# Patient Record
Sex: FEMALE | Race: WHITE | NOT HISPANIC OR LATINO | Employment: OTHER | URBAN - METROPOLITAN AREA
[De-identification: names, ages, dates, MRNs, and addresses within clinical notes are randomized per-mention and may not be internally consistent; named-entity substitution may affect disease eponyms.]

---

## 2020-09-21 ENCOUNTER — HOSPITAL ENCOUNTER (EMERGENCY)
Facility: HOSPITAL | Age: 75
Discharge: HOME | End: 2020-09-21
Attending: EMERGENCY MEDICINE
Payer: MEDICARE

## 2020-09-21 VITALS
OXYGEN SATURATION: 97 % | DIASTOLIC BLOOD PRESSURE: 78 MMHG | TEMPERATURE: 99 F | SYSTOLIC BLOOD PRESSURE: 136 MMHG | HEART RATE: 78 BPM | RESPIRATION RATE: 16 BRPM | HEIGHT: 63 IN
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
## 2020-09-21 DIAGNOSIS — S01.01XA LACERATION OF OCCIPITAL SCALP, INITIAL ENCOUNTER: Primary | ICD-10-CM

## 2020-09-21 PROCEDURE — 12001 RPR S/N/AX/GEN/TRNK 2.5CM/<: CPT

## 2020-09-21 PROCEDURE — 25000003 PHARM REV CODE 250: Performed by: NURSE PRACTITIONER

## 2020-09-21 PROCEDURE — 99284 EMERGENCY DEPT VISIT MOD MDM: CPT | Mod: 25

## 2020-09-21 RX ORDER — LOSARTAN POTASSIUM 100 MG/1
100 TABLET ORAL
Status: ON HOLD | COMMUNITY
End: 2021-04-27 | Stop reason: SDUPTHER

## 2020-09-21 RX ORDER — GLIPIZIDE 2.5 MG/1
5 TABLET, EXTENDED RELEASE ORAL
Status: ON HOLD | COMMUNITY
End: 2021-04-27 | Stop reason: HOSPADM

## 2020-09-21 RX ORDER — TRAZODONE HYDROCHLORIDE 100 MG/1
100 TABLET ORAL NIGHTLY
COMMUNITY

## 2020-09-21 RX ORDER — CARVEDILOL 25 MG/1
25 TABLET ORAL
Status: ON HOLD | COMMUNITY
End: 2021-04-27 | Stop reason: SDUPTHER

## 2020-09-21 RX ORDER — LEVOTHYROXINE SODIUM 100 UG/1
100 TABLET ORAL
COMMUNITY

## 2020-09-21 RX ORDER — ATORVASTATIN CALCIUM 20 MG/1
20 TABLET, FILM COATED ORAL
Status: ON HOLD | COMMUNITY
End: 2021-04-27 | Stop reason: SDUPTHER

## 2020-09-21 RX ORDER — LIDOCAINE HYDROCHLORIDE 10 MG/ML
5 INJECTION, SOLUTION EPIDURAL; INFILTRATION; INTRACAUDAL; PERINEURAL ONCE
Status: DISCONTINUED | OUTPATIENT
Start: 2020-09-21 | End: 2020-09-21 | Stop reason: HOSPADM

## 2020-09-21 RX ADMIN — Medication: at 06:09

## 2020-09-21 NOTE — ED PROVIDER NOTES
Encounter Date: 9/21/2020       History     Chief Complaint   Patient presents with    Fall     fell while moving a table today and fell and hit back of head, denies loss of consciousness, was told by PCP to come to ER and get CT. no blood thinners.     75-year-old female presents with her  related to fall incident today while she was at home between 130 and 3:00 p.m.  She was helping her  move the diarrhea room table and tripped and fell backwards and hit the occipital region posterior scalp, sustained laceration and contusion injury.  She was seen at her primary care physician's office around 330 today and told to report to the emergency room to have CT scan of head.   No loss of consciousness.  No confusion.  Has remained oriented x 3.   Provides her prior medical history this presentation without difficulty.          Review of patient's allergies indicates:  No Known Allergies  History reviewed. No pertinent past medical history.  Past Surgical History:   Procedure Laterality Date    APPENDECTOMY      CARDIAC SURGERY      CHOLECYSTECTOMY      CORONARY ARTERY BYPASS GRAFT      HYSTERECTOMY      THYROID SURGERY  1995     Family History   Problem Relation Age of Onset    Chronic back pain Mother     Heart disease Father     Hypertension Father      Social History     Tobacco Use    Smoking status: Never Smoker   Substance Use Topics    Alcohol use: Yes     Alcohol/week: 2.0 standard drinks     Types: 2 Glasses of wine per week     Comment: nightly, 2 glasses of wine    Drug use: Never     Review of Systems   Constitutional: Negative for activity change, appetite change, fatigue and fever.   HENT: Negative.  Negative for sore throat.    Eyes: Negative.    Respiratory: Negative for shortness of breath.    Cardiovascular: Negative for chest pain, palpitations and leg swelling.   Gastrointestinal: Negative.  Negative for abdominal pain and nausea.   Endocrine: Negative for cold intolerance  and heat intolerance.   Genitourinary: Negative for decreased urine volume, difficulty urinating, dysuria and frequency.   Musculoskeletal: Negative for back pain.   Skin: Positive for wound (scalp). Negative for color change and rash.   Neurological: Negative for dizziness, tremors, seizures, syncope, facial asymmetry, speech difficulty, weakness, light-headedness, numbness and headaches.   Hematological: Does not bruise/bleed easily.       Physical Exam     Initial Vitals [09/21/20 1727]   BP Pulse Resp Temp SpO2   (!) 144/71 (!) 59 16 98.5 °F (36.9 °C) (!) 94 %      MAP       --         Physical Exam    Nursing note and vitals reviewed.  Constitutional: She appears well-developed and well-nourished.   HENT:   Head: Normocephalic and atraumatic.       Right Ear: Tympanic membrane, external ear and ear canal normal.   Left Ear: Tympanic membrane, external ear and ear canal normal.   Nose: Nose normal.   Mouth/Throat: Oropharynx is clear and moist. No oropharyngeal exudate.   Eyes: Conjunctivae, EOM and lids are normal. Pupils are equal, round, and reactive to light.   Neck: Trachea normal, normal range of motion and full passive range of motion without pain. Neck supple.   Cardiovascular: Normal rate, regular rhythm, normal heart sounds and intact distal pulses.   Pulmonary/Chest: Breath sounds normal.   Abdominal: Soft.   Musculoskeletal: Normal range of motion.   Neurological: She is alert and oriented to person, place, and time. She has normal strength and normal reflexes. No cranial nerve deficit or sensory deficit. Coordination and gait normal.   Skin: Skin is warm and dry. Capillary refill takes less than 2 seconds.   Psychiatric: She has a normal mood and affect. Her behavior is normal. Thought content normal.         ED Course   Lac Repair    Date/Time: 9/21/2020 7:55 PM  Performed by: Kellen Howard NP  Authorized by: Loren Brumfield DO   Body area: head/neck  Location details: scalp  Laceration  length: 2 cm  Foreign bodies: no foreign bodies  Tendon involvement: none  Nerve involvement: none  Vascular damage: no  Anesthesia method: let     Anesthesia:  Local Anesthetic: LET (lido,epi,tetracaine) and lidocaine 1% without epinephrine  Anesthetic total: 5 mL  Patient sedated: no  Irrigation solution: saline  Irrigation method: syringe  Amount of cleaning: standard  Debridement: none  Degree of undermining: none  Skin closure: staples  Number of sutures: 2 (2 staples)  Approximation: loose  Approximation difficulty: simple  Dressing: 4x4 sterile gauze  Patient tolerance: Patient tolerated the procedure well with no immediate complications      patient refused lab PT, complete blood count, basic metabolic panel, hepatitis c antibody, states had lab work today at primary care provider office.   Labs Reviewed - No data to display       Imaging Results          CT Head Without Contrast (Final result)  Result time 09/21/20 18:31:27    Final result by David Crawford MD (09/21/20 18:31:27)                 Impression:      No acute intracranial CT abnormality.    Small extra calvarial occipital area of fat stranding.    All CT scans at this facility are performed  using dose modulation techniques as appropriate to performed exam including the following:  automated exposure control; adjustment of mA and/or kV according to the patients size (this includes techniques or standardized protocols for targeted exams where dose is matched to indication/reason for exam: i.e. extremities or head);  iterative reconstruction technique.      Electronically signed by: David Crawford  Date:    09/21/2020  Time:    18:31             Narrative:    EXAMINATION:  CT HEAD WITHOUT CONTRAST    CLINICAL HISTORY:  fall with occipital head trauma/laceration;    TECHNIQUE:  Low dose axial CT images obtained throughout the head without intravenous contrast. Sagittal and coronal reconstructions were performed.    COMPARISON:  CT head without  contrast 07/11/2012    FINDINGS:  Intracranial compartment:    Ventricles and sulci are normal in size for age without evidence of hydrocephalus. No extra-axial blood or fluid collections.    Mild microvascular ischemic changes.  Probable remote lacunar infarcts in the right thalamus, left external capsule, and left thalamus.  No parenchymal mass, hemorrhage, edema or major vascular distribution infarct.    Skull/extracranial contents (limited evaluation): No fracture. Mastoid air cells and paranasal sinuses are essentially clear.  Small extra calvarial occipital area of fat stranding.                                                           0800 Reassessed pt at this time. Pt is awake, alert, and in NAD at this time. Discussed with pt all pertinent ED information and results. Discussed pt dx and plan of tx. Gave pt all f/u and return to the ED instructions. All questions and concerns were addressed at this time. Pt expresses understanding of information and instructions, and is comfortable with plan to discharge. Pt is stable for discharge.  Patient remained neurologically intact and oriented and appropriate in tired stay in the emergency room.  Advised of results.     Clinical Impression:     ICD-10-CM ICD-9-CM   1. Laceration of occipital scalp, initial encounter  S01.01XA 873.0   2. Fall, initial encounter  W19.XXXA E888.9                      Disposition:   Disposition: Discharged  Condition: Stable     ED Disposition Condition    Discharge Stable        ED Prescriptions     None        Follow-up Information     Follow up With Specialties Details Why Contact Info    April H MD [facility] In 1 week recheck wound and remove staples [address] [facility] [address]  [phone]      [facility] -  Emergency Medicine  If symptoms worsen or new onset of symptoms [address]  [facility] [address] [phone]                                        Kellen Howard, PEGGY  09/21/20 2006       Kellen Howard, PEGGY  10/07/20 0832

## 2020-09-22 ENCOUNTER — PES CALL (OUTPATIENT)
Dept: ADMINISTRATIVE | Facility: CLINIC | Age: 75
End: 2020-09-22

## 2020-09-22 NOTE — ED NOTES
Patient identifiers verified and correct for Emely Bush. Patient accompanied by  to ER after fall earlier today moving a table. Patient denies loss of consciousness and denies being on blood thinners but is found to have a laceration to the back of her head.    LOC: The patient is awake, alert and aware of environment with an appropriate affect, the patient is oriented x 3 and speaking appropriately.  APPEARANCE: Patient resting comfortably and in no acute distress, patient is clean and well groomed, patient's clothing is properly fastened.  SKIN: The skin is warm and dry, color consistent with ethnicity, patient has normal skin turgor and moist mucus membranes, skin intact, no breakdown or bruising noted.  MUSCULOSKELETAL: Patient moving all extremities spontaneously.  RESPIRATORY: Airway is open and patent, respirations are spontaneous.  CARDIAC: Patient has a normal rate, no periphreal edema noted, capillary refill < 3 seconds.  ABDOMEN: Soft and non tender to palpation.

## 2020-09-22 NOTE — ED NOTES
Small laceration noted to occipital aspect of head, approximately 1cm in length, scant amount of dried blood noted, not actively bleeding at this time. AAOx4

## 2021-04-24 ENCOUNTER — HOSPITAL ENCOUNTER (INPATIENT)
Facility: HOSPITAL | Age: 76
LOS: 3 days | Discharge: NURSING, REHAB OR LONG-TERM CARE | DRG: 481 | End: 2021-04-27
Attending: FAMILY MEDICINE | Admitting: INTERNAL MEDICINE
Payer: MEDICARE

## 2021-04-24 DIAGNOSIS — E11.40 CONTROLLED TYPE 2 DIABETES WITH NEUROPATHY: ICD-10-CM

## 2021-04-24 DIAGNOSIS — F10.930 ALCOHOL WITHDRAWAL SYNDROME WITHOUT COMPLICATION: ICD-10-CM

## 2021-04-24 DIAGNOSIS — G89.29 CHRONIC LOW BACK PAIN WITH SCIATICA, SCIATICA LATERALITY UNSPECIFIED, UNSPECIFIED BACK PAIN LATERALITY: ICD-10-CM

## 2021-04-24 DIAGNOSIS — S72.142A CLOSED INTERTROCHANTERIC FRACTURE OF LEFT HIP, INITIAL ENCOUNTER: ICD-10-CM

## 2021-04-24 DIAGNOSIS — I50.9 CHF (CONGESTIVE HEART FAILURE): ICD-10-CM
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
## 2021-04-24 DIAGNOSIS — M54.40 CHRONIC LOW BACK PAIN WITH SCIATICA, SCIATICA LATERALITY UNSPECIFIED, UNSPECIFIED BACK PAIN LATERALITY: ICD-10-CM

## 2021-04-24 DIAGNOSIS — S72.92XA CLOSED FRACTURE OF LEFT FEMUR, UNSPECIFIED FRACTURE MORPHOLOGY, UNSPECIFIED PORTION OF FEMUR, INITIAL ENCOUNTER: Primary | ICD-10-CM

## 2021-04-24 DIAGNOSIS — Z01.818 PRE-OP EVALUATION: ICD-10-CM

## 2021-04-24 LAB
ABO + RH BLD: NORMAL
ALBUMIN SERPL BCP-MCNC: 3.3 G/DL (ref 3.5–5.2)
ALP SERPL-CCNC: 72 U/L (ref 55–135)
ALT SERPL W/O P-5'-P-CCNC: 20 U/L (ref 10–44)
ANION GAP SERPL CALC-SCNC: 11 MMOL/L (ref 8–16)
APTT BLDCRRT: <21 SEC (ref 21–32)
AST SERPL-CCNC: 28 U/L (ref 10–40)
AV INDEX (PROSTH): 0.47
AV MEAN GRADIENT: 6 MMHG
AV PEAK GRADIENT: 11 MMHG
AV VALVE AREA: 1.5 CM2
AV VELOCITY RATIO: 0.59
BASOPHILS # BLD AUTO: 0.05 K/UL (ref 0–0.2)
BASOPHILS NFR BLD: 0.5 % (ref 0–1.9)
BILIRUB SERPL-MCNC: 0.4 MG/DL (ref 0.1–1)
BILIRUB UR QL STRIP: NEGATIVE
BLD GP AB SCN CELLS X3 SERPL QL: NORMAL
BUN SERPL-MCNC: 11 MG/DL (ref 8–23)
CALCIUM SERPL-MCNC: 8 MG/DL (ref 8.7–10.5)
CHLORIDE SERPL-SCNC: 104 MMOL/L (ref 95–110)
CLARITY UR: CLEAR
CO2 SERPL-SCNC: 22 MMOL/L (ref 23–29)
COLOR UR: YELLOW
CREAT SERPL-MCNC: 0.7 MG/DL (ref 0.5–1.4)
CTP QC/QA: YES
CV ECHO LV RWT: 0.47 CM
DIFFERENTIAL METHOD: ABNORMAL
DOP CALC AO PEAK VEL: 1.63 M/S
DOP CALC AO VTI: 39.3 CM
DOP CALC LVOT AREA: 3.2 CM2
DOP CALC LVOT DIAMETER: 2.02 CM
DOP CALC LVOT PEAK VEL: 0.96 M/S
DOP CALC LVOT STROKE VOLUME: 59 CM3
DOP CALCLVOT PEAK VEL VTI: 18.42 CM
E WAVE DECELERATION TIME: 265.48 MSEC
E/A RATIO: 0.83
E/E' RATIO: 11.2 M/S
ECHO LV POSTERIOR WALL: 1.1 CM (ref 0.6–1.1)
EJECTION FRACTION: 55 %
EOSINOPHIL # BLD AUTO: 0 K/UL (ref 0–0.5)
EOSINOPHIL NFR BLD: 0.3 % (ref 0–8)
ERYTHROCYTE [DISTWIDTH] IN BLOOD BY AUTOMATED COUNT: 15 % (ref 11.5–14.5)
EST. GFR  (AFRICAN AMERICAN): >60 ML/MIN/1.73 M^2
EST. GFR  (NON AFRICAN AMERICAN): >60 ML/MIN/1.73 M^2
ESTIMATED AVG GLUCOSE: 163 MG/DL (ref 68–131)
FRACTIONAL SHORTENING: 24 % (ref 28–44)
GLUCOSE SERPL-MCNC: 221 MG/DL (ref 70–110)
GLUCOSE UR QL STRIP: ABNORMAL
HBA1C MFR BLD: 7.3 % (ref 4–5.6)
HCT VFR BLD AUTO: 30.5 % (ref 37–48.5)
HGB BLD-MCNC: 9.5 G/DL (ref 12–16)
HGB UR QL STRIP: NEGATIVE
IMM GRANULOCYTES # BLD AUTO: 0.05 K/UL (ref 0–0.04)
IMM GRANULOCYTES NFR BLD AUTO: 0.5 % (ref 0–0.5)
INR PPP: 1 (ref 0.8–1.2)
INTERVENTRICULAR SEPTUM: 1.13 CM (ref 0.6–1.1)
IVRT: 99.9 MSEC
KETONES UR QL STRIP: ABNORMAL
LA MAJOR: 5.69 CM
LA MINOR: 5.3 CM
LA WIDTH: 4.91 CM
LEFT ATRIUM SIZE: 3.7 CM
LEFT ATRIUM VOLUME INDEX: 47.1 ML/M2
LEFT ATRIUM VOLUME: 84.75 CM3
LEFT INTERNAL DIMENSION IN SYSTOLE: 3.58 CM (ref 2.1–4)
LEFT VENTRICLE DIASTOLIC VOLUME INDEX: 56.32 ML/M2
LEFT VENTRICLE DIASTOLIC VOLUME: 101.38 ML
LEFT VENTRICLE MASS INDEX: 105 G/M2
LEFT VENTRICLE SYSTOLIC VOLUME INDEX: 29.8 ML/M2
LEFT VENTRICLE SYSTOLIC VOLUME: 53.63 ML
LEFT VENTRICULAR INTERNAL DIMENSION IN DIASTOLE: 4.68 CM (ref 3.5–6)
LEFT VENTRICULAR MASS: 189.83 G
LEUKOCYTE ESTERASE UR QL STRIP: NEGATIVE
LV LATERAL E/E' RATIO: 8.4 M/S
LV SEPTAL E/E' RATIO: 16.8 M/S
LYMPHOCYTES # BLD AUTO: 1.3 K/UL (ref 1–4.8)
LYMPHOCYTES NFR BLD: 13 % (ref 18–48)
MCH RBC QN AUTO: 24.7 PG (ref 27–31)
MCHC RBC AUTO-ENTMCNC: 31.1 G/DL (ref 32–36)
MCV RBC AUTO: 79 FL (ref 82–98)
MONOCYTES # BLD AUTO: 0.5 K/UL (ref 0.3–1)
MONOCYTES NFR BLD: 5.4 % (ref 4–15)
MV PEAK A VEL: 1.01 M/S
MV PEAK E VEL: 0.84 M/S
NEUTROPHILS # BLD AUTO: 7.9 K/UL (ref 1.8–7.7)
NEUTROPHILS NFR BLD: 80.3 % (ref 38–73)
NITRITE UR QL STRIP: NEGATIVE
NRBC BLD-RTO: 0 /100 WBC
PH UR STRIP: 7 [PH] (ref 5–8)
PLATELET # BLD AUTO: 246 K/UL (ref 150–450)
PMV BLD AUTO: 9.7 FL (ref 9.2–12.9)
POCT GLUCOSE: 169 MG/DL (ref 70–110)
POCT GLUCOSE: 173 MG/DL (ref 70–110)
POCT GLUCOSE: 209 MG/DL (ref 70–110)
POCT GLUCOSE: 214 MG/DL (ref 70–110)
POTASSIUM SERPL-SCNC: 3.8 MMOL/L (ref 3.5–5.1)
PROT SERPL-MCNC: 5.8 G/DL (ref 6–8.4)
PROT UR QL STRIP: NEGATIVE
PROTHROMBIN TIME: 11.3 SEC (ref 9–12.5)
PV PEAK VELOCITY: 1.07 CM/S
RA MAJOR: 5.57 CM
RA WIDTH: 4.18 CM
RBC # BLD AUTO: 3.85 M/UL (ref 4–5.4)
SARS-COV-2 RDRP RESP QL NAA+PROBE: NEGATIVE
SINUS: 2.78 CM
SODIUM SERPL-SCNC: 137 MMOL/L (ref 136–145)
SP GR UR STRIP: 1.01 (ref 1–1.03)
STJ: 2.37 CM
T4 FREE SERPL-MCNC: 0.86 NG/DL (ref 0.71–1.51)
TDI LATERAL: 0.1 M/S
TDI SEPTAL: 0.05 M/S
TDI: 0.08 M/S
TRICUSPID ANNULAR PLANE SYSTOLIC EXCURSION: 1.93 CM
TSH SERPL DL<=0.005 MIU/L-ACNC: 8.14 UIU/ML (ref 0.4–4)
URN SPEC COLLECT METH UR: ABNORMAL
UROBILINOGEN UR STRIP-ACNC: NEGATIVE EU/DL
WBC # BLD AUTO: 9.83 K/UL (ref 3.9–12.7)

## 2021-04-24 PROCEDURE — U0002 COVID-19 LAB TEST NON-CDC: HCPCS | Performed by: FAMILY MEDICINE

## 2021-04-24 PROCEDURE — 25000003 PHARM REV CODE 250: Performed by: NURSE PRACTITIONER

## 2021-04-24 PROCEDURE — 86900 BLOOD TYPING SEROLOGIC ABO: CPT | Performed by: FAMILY MEDICINE

## 2021-04-24 PROCEDURE — 85730 THROMBOPLASTIN TIME PARTIAL: CPT | Performed by: FAMILY MEDICINE

## 2021-04-24 PROCEDURE — 93010 ELECTROCARDIOGRAM REPORT: CPT | Mod: ,,, | Performed by: INTERNAL MEDICINE

## 2021-04-24 PROCEDURE — 84439 ASSAY OF FREE THYROXINE: CPT | Performed by: FAMILY MEDICINE

## 2021-04-24 PROCEDURE — 25000003 PHARM REV CODE 250: Performed by: INTERNAL MEDICINE

## 2021-04-24 PROCEDURE — 85610 PROTHROMBIN TIME: CPT | Performed by: FAMILY MEDICINE

## 2021-04-24 PROCEDURE — 84443 ASSAY THYROID STIM HORMONE: CPT | Performed by: FAMILY MEDICINE

## 2021-04-24 PROCEDURE — 83036 HEMOGLOBIN GLYCOSYLATED A1C: CPT | Performed by: FAMILY MEDICINE

## 2021-04-24 PROCEDURE — 63600175 PHARM REV CODE 636 W HCPCS: Performed by: INTERNAL MEDICINE

## 2021-04-24 PROCEDURE — 96374 THER/PROPH/DIAG INJ IV PUSH: CPT

## 2021-04-24 PROCEDURE — 99285 EMERGENCY DEPT VISIT HI MDM: CPT | Mod: 25

## 2021-04-24 PROCEDURE — 85025 COMPLETE CBC W/AUTO DIFF WBC: CPT | Performed by: FAMILY MEDICINE

## 2021-04-24 PROCEDURE — 11000001 HC ACUTE MED/SURG PRIVATE ROOM

## 2021-04-24 PROCEDURE — 63600175 PHARM REV CODE 636 W HCPCS: Performed by: FAMILY MEDICINE

## 2021-04-24 PROCEDURE — 80053 COMPREHEN METABOLIC PANEL: CPT | Performed by: FAMILY MEDICINE

## 2021-04-24 PROCEDURE — 81003 URINALYSIS AUTO W/O SCOPE: CPT | Performed by: FAMILY MEDICINE

## 2021-04-24 PROCEDURE — 96375 TX/PRO/DX INJ NEW DRUG ADDON: CPT

## 2021-04-24 PROCEDURE — 93010 EKG 12-LEAD: ICD-10-PCS | Mod: ,,, | Performed by: INTERNAL MEDICINE

## 2021-04-24 PROCEDURE — 93005 ELECTROCARDIOGRAM TRACING: CPT

## 2021-04-24 PROCEDURE — 36415 COLL VENOUS BLD VENIPUNCTURE: CPT | Performed by: FAMILY MEDICINE

## 2021-04-24 PROCEDURE — 96376 TX/PRO/DX INJ SAME DRUG ADON: CPT

## 2021-04-24 RX ORDER — ONDANSETRON 2 MG/ML
4 INJECTION INTRAMUSCULAR; INTRAVENOUS
Status: COMPLETED | OUTPATIENT
Start: 2021-04-24 | End: 2021-04-24

## 2021-04-24 RX ORDER — DIPHENHYDRAMINE HCL 25 MG
25 CAPSULE ORAL EVERY 6 HOURS PRN
Status: DISCONTINUED | OUTPATIENT
Start: 2021-04-24 | End: 2021-04-27 | Stop reason: HOSPADM

## 2021-04-24 RX ORDER — ONDANSETRON 2 MG/ML
4 INJECTION INTRAMUSCULAR; INTRAVENOUS EVERY 8 HOURS PRN
Status: DISCONTINUED | OUTPATIENT
Start: 2021-04-24 | End: 2021-04-27 | Stop reason: HOSPADM

## 2021-04-24 RX ORDER — INSULIN ASPART 100 [IU]/ML
1-10 INJECTION, SOLUTION INTRAVENOUS; SUBCUTANEOUS EVERY 6 HOURS PRN
Status: DISCONTINUED | OUTPATIENT
Start: 2021-04-24 | End: 2021-04-27

## 2021-04-24 RX ORDER — LEVOTHYROXINE SODIUM 100 UG/1
100 TABLET ORAL
Status: DISCONTINUED | OUTPATIENT
Start: 2021-04-24 | End: 2021-04-27 | Stop reason: HOSPADM

## 2021-04-24 RX ORDER — IPRATROPIUM BROMIDE AND ALBUTEROL SULFATE 2.5; .5 MG/3ML; MG/3ML
3 SOLUTION RESPIRATORY (INHALATION) EVERY 4 HOURS PRN
Status: DISCONTINUED | OUTPATIENT
Start: 2021-04-24 | End: 2021-04-27 | Stop reason: HOSPADM

## 2021-04-24 RX ORDER — MAG HYDROX/ALUMINUM HYD/SIMETH 200-200-20
30 SUSPENSION, ORAL (FINAL DOSE FORM) ORAL EVERY 6 HOURS PRN
Status: DISCONTINUED | OUTPATIENT
Start: 2021-04-24 | End: 2021-04-27 | Stop reason: HOSPADM

## 2021-04-24 RX ORDER — TALC
6 POWDER (GRAM) TOPICAL NIGHTLY PRN
Status: DISCONTINUED | OUTPATIENT
Start: 2021-04-24 | End: 2021-04-27

## 2021-04-24 RX ORDER — HYDROMORPHONE HYDROCHLORIDE 1 MG/ML
0.5 INJECTION, SOLUTION INTRAMUSCULAR; INTRAVENOUS; SUBCUTANEOUS
Status: COMPLETED | OUTPATIENT
Start: 2021-04-24 | End: 2021-04-24

## 2021-04-24 RX ORDER — LIDOCAINE 50 MG/G
1 PATCH TOPICAL
Status: DISCONTINUED | OUTPATIENT
Start: 2021-04-25 | End: 2021-04-26

## 2021-04-24 RX ORDER — HYDRALAZINE HYDROCHLORIDE 20 MG/ML
10 INJECTION INTRAMUSCULAR; INTRAVENOUS EVERY 6 HOURS PRN
Status: DISCONTINUED | OUTPATIENT
Start: 2021-04-24 | End: 2021-04-27 | Stop reason: HOSPADM

## 2021-04-24 RX ORDER — CARVEDILOL 12.5 MG/1
25 TABLET ORAL 2 TIMES DAILY WITH MEALS
Status: DISCONTINUED | OUTPATIENT
Start: 2021-04-25 | End: 2021-04-27 | Stop reason: HOSPADM

## 2021-04-24 RX ORDER — MORPHINE SULFATE 4 MG/ML
4 INJECTION, SOLUTION INTRAMUSCULAR; INTRAVENOUS EVERY 4 HOURS PRN
Status: DISCONTINUED | OUTPATIENT
Start: 2021-04-24 | End: 2021-04-26

## 2021-04-24 RX ORDER — MORPHINE SULFATE 2 MG/ML
2 INJECTION, SOLUTION INTRAMUSCULAR; INTRAVENOUS EVERY 4 HOURS PRN
Status: DISCONTINUED | OUTPATIENT
Start: 2021-04-24 | End: 2021-04-26 | Stop reason: CLARIF

## 2021-04-24 RX ORDER — LOSARTAN POTASSIUM 50 MG/1
100 TABLET ORAL DAILY
Status: DISCONTINUED | OUTPATIENT
Start: 2021-04-24 | End: 2021-04-24

## 2021-04-24 RX ORDER — ACETAMINOPHEN 325 MG/1
650 TABLET ORAL EVERY 6 HOURS PRN
Status: DISCONTINUED | OUTPATIENT
Start: 2021-04-24 | End: 2021-04-26

## 2021-04-24 RX ORDER — SODIUM CHLORIDE 0.9 % (FLUSH) 0.9 %
10 SYRINGE (ML) INJECTION
Status: DISCONTINUED | OUTPATIENT
Start: 2021-04-24 | End: 2021-04-27 | Stop reason: HOSPADM

## 2021-04-24 RX ORDER — TRAZODONE HYDROCHLORIDE 50 MG/1
50 TABLET ORAL ONCE
Status: COMPLETED | OUTPATIENT
Start: 2021-04-25 | End: 2021-04-24

## 2021-04-24 RX ORDER — GUAIFENESIN 100 MG/5ML
200 SOLUTION ORAL EVERY 4 HOURS PRN
Status: DISCONTINUED | OUTPATIENT
Start: 2021-04-24 | End: 2021-04-27 | Stop reason: HOSPADM

## 2021-04-24 RX ORDER — GLUCAGON 1 MG
1 KIT INJECTION
Status: DISCONTINUED | OUTPATIENT
Start: 2021-04-24 | End: 2021-04-27

## 2021-04-24 RX ORDER — ATORVASTATIN CALCIUM 10 MG/1
20 TABLET, FILM COATED ORAL NIGHTLY
Status: DISCONTINUED | OUTPATIENT
Start: 2021-04-24 | End: 2021-04-27 | Stop reason: HOSPADM

## 2021-04-24 RX ORDER — LOSARTAN POTASSIUM 50 MG/1
100 TABLET ORAL ONCE
Status: COMPLETED | OUTPATIENT
Start: 2021-04-24 | End: 2021-04-24

## 2021-04-24 RX ADMIN — TRAZODONE HYDROCHLORIDE 50 MG: 50 TABLET ORAL at 11:04

## 2021-04-24 RX ADMIN — ONDANSETRON 4 MG: 2 INJECTION INTRAMUSCULAR; INTRAVENOUS at 01:04

## 2021-04-24 RX ADMIN — HYDROMORPHONE HYDROCHLORIDE 0.5 MG: 1 INJECTION, SOLUTION INTRAMUSCULAR; INTRAVENOUS; SUBCUTANEOUS at 04:04

## 2021-04-24 RX ADMIN — ATORVASTATIN CALCIUM 20 MG: 10 TABLET, FILM COATED ORAL at 08:04

## 2021-04-24 RX ADMIN — MORPHINE SULFATE 4 MG: 4 INJECTION INTRAVENOUS at 06:04

## 2021-04-24 RX ADMIN — INSULIN ASPART 4 UNITS: 100 INJECTION, SOLUTION INTRAVENOUS; SUBCUTANEOUS at 07:04

## 2021-04-24 RX ADMIN — HYDRALAZINE HYDROCHLORIDE 10 MG: 20 INJECTION INTRAMUSCULAR; INTRAVENOUS at 07:04

## 2021-04-24 RX ADMIN — MORPHINE SULFATE 4 MG: 4 INJECTION INTRAVENOUS at 07:04

## 2021-04-24 RX ADMIN — INSULIN ASPART 2 UNITS: 100 INJECTION, SOLUTION INTRAVENOUS; SUBCUTANEOUS at 11:04

## 2021-04-24 RX ADMIN — INSULIN ASPART 2 UNITS: 100 INJECTION, SOLUTION INTRAVENOUS; SUBCUTANEOUS at 12:04

## 2021-04-24 RX ADMIN — MORPHINE SULFATE 4 MG: 4 INJECTION INTRAVENOUS at 11:04

## 2021-04-24 RX ADMIN — LOSARTAN POTASSIUM 100 MG: 50 TABLET, FILM COATED ORAL at 04:04

## 2021-04-24 RX ADMIN — LEVOTHYROXINE SODIUM 100 MCG: 100 TABLET ORAL at 09:04

## 2021-04-24 RX ADMIN — INSULIN ASPART 2 UNITS: 100 INJECTION, SOLUTION INTRAVENOUS; SUBCUTANEOUS at 05:04

## 2021-04-24 RX ADMIN — MORPHINE SULFATE 4 MG: 4 INJECTION INTRAVENOUS at 03:04

## 2021-04-24 RX ADMIN — HYDROMORPHONE HYDROCHLORIDE 0.5 MG: 1 INJECTION, SOLUTION INTRAMUSCULAR; INTRAVENOUS; SUBCUTANEOUS at 01:04

## 2021-04-24 RX ADMIN — LIDOCAINE 1 PATCH: 50 PATCH CUTANEOUS at 11:04

## 2021-04-24 RX ADMIN — MORPHINE SULFATE 4 MG: 4 INJECTION INTRAVENOUS at 10:04

## 2021-04-25 ENCOUNTER — ANESTHESIA (OUTPATIENT)
Dept: SURGERY | Facility: HOSPITAL | Age: 76
DRG: 481 | End: 2021-04-25
Payer: MEDICARE

## 2021-04-25 ENCOUNTER — ANESTHESIA EVENT (OUTPATIENT)
Dept: SURGERY | Facility: HOSPITAL | Age: 76
DRG: 481 | End: 2021-04-25
Payer: MEDICARE

## 2021-04-25 LAB
ALBUMIN SERPL BCP-MCNC: 3.3 G/DL (ref 3.5–5.2)
ALP SERPL-CCNC: 120 U/L (ref 55–135)
ALT SERPL W/O P-5'-P-CCNC: 74 U/L (ref 10–44)
ANION GAP SERPL CALC-SCNC: 11 MMOL/L (ref 8–16)
AST SERPL-CCNC: 77 U/L (ref 10–40)
BASOPHILS # BLD AUTO: 0.04 K/UL (ref 0–0.2)
BASOPHILS NFR BLD: 0.5 % (ref 0–1.9)
BILIRUB SERPL-MCNC: 1.2 MG/DL (ref 0.1–1)
BUN SERPL-MCNC: 9 MG/DL (ref 8–23)
CALCIUM SERPL-MCNC: 8.7 MG/DL (ref 8.7–10.5)
CHLORIDE SERPL-SCNC: 98 MMOL/L (ref 95–110)
CO2 SERPL-SCNC: 26 MMOL/L (ref 23–29)
CREAT SERPL-MCNC: 0.7 MG/DL (ref 0.5–1.4)
DIFFERENTIAL METHOD: ABNORMAL
EOSINOPHIL # BLD AUTO: 0.1 K/UL (ref 0–0.5)
EOSINOPHIL NFR BLD: 0.8 % (ref 0–8)
ERYTHROCYTE [DISTWIDTH] IN BLOOD BY AUTOMATED COUNT: 15.3 % (ref 11.5–14.5)
EST. GFR  (AFRICAN AMERICAN): >60 ML/MIN/1.73 M^2
EST. GFR  (NON AFRICAN AMERICAN): >60 ML/MIN/1.73 M^2
GLUCOSE SERPL-MCNC: 196 MG/DL (ref 70–110)
HCT VFR BLD AUTO: 35.4 % (ref 37–48.5)
HGB BLD-MCNC: 10.5 G/DL (ref 12–16)
IMM GRANULOCYTES # BLD AUTO: 0.02 K/UL (ref 0–0.04)
IMM GRANULOCYTES NFR BLD AUTO: 0.3 % (ref 0–0.5)
LYMPHOCYTES # BLD AUTO: 1.2 K/UL (ref 1–4.8)
LYMPHOCYTES NFR BLD: 15.4 % (ref 18–48)
MAGNESIUM SERPL-MCNC: 1.7 MG/DL (ref 1.6–2.6)
MCH RBC QN AUTO: 24.8 PG (ref 27–31)
MCHC RBC AUTO-ENTMCNC: 29.7 G/DL (ref 32–36)
MCV RBC AUTO: 84 FL (ref 82–98)
MONOCYTES # BLD AUTO: 0.8 K/UL (ref 0.3–1)
MONOCYTES NFR BLD: 9.9 % (ref 4–15)
NEUTROPHILS # BLD AUTO: 5.6 K/UL (ref 1.8–7.7)
NEUTROPHILS NFR BLD: 73.1 % (ref 38–73)
NRBC BLD-RTO: 0 /100 WBC
PHOSPHATE SERPL-MCNC: 2.7 MG/DL (ref 2.7–4.5)
PLATELET # BLD AUTO: 217 K/UL (ref 150–450)
PMV BLD AUTO: 10.3 FL (ref 9.2–12.9)
POCT GLUCOSE: 114 MG/DL (ref 70–110)
POCT GLUCOSE: 188 MG/DL (ref 70–110)
POCT GLUCOSE: 203 MG/DL (ref 70–110)
POCT GLUCOSE: 243 MG/DL (ref 70–110)
POCT GLUCOSE: 243 MG/DL (ref 70–110)
POTASSIUM SERPL-SCNC: 4.1 MMOL/L (ref 3.5–5.1)
PROT SERPL-MCNC: 6.3 G/DL (ref 6–8.4)
RBC # BLD AUTO: 4.23 M/UL (ref 4–5.4)
SODIUM SERPL-SCNC: 135 MMOL/L (ref 136–145)
WBC # BLD AUTO: 7.71 K/UL (ref 3.9–12.7)

## 2021-04-25 PROCEDURE — 83735 ASSAY OF MAGNESIUM: CPT | Performed by: INTERNAL MEDICINE

## 2021-04-25 PROCEDURE — 36000711: Performed by: ORTHOPAEDIC SURGERY

## 2021-04-25 PROCEDURE — 27201423 OPTIME MED/SURG SUP & DEVICES STERILE SUPPLY: Performed by: ORTHOPAEDIC SURGERY

## 2021-04-25 PROCEDURE — C1713 ANCHOR/SCREW BN/BN,TIS/BN: HCPCS | Performed by: ORTHOPAEDIC SURGERY

## 2021-04-25 PROCEDURE — 37000009 HC ANESTHESIA EA ADD 15 MINS: Performed by: ORTHOPAEDIC SURGERY

## 2021-04-25 PROCEDURE — 36415 COLL VENOUS BLD VENIPUNCTURE: CPT | Performed by: NURSE PRACTITIONER

## 2021-04-25 PROCEDURE — 80053 COMPREHEN METABOLIC PANEL: CPT | Performed by: INTERNAL MEDICINE

## 2021-04-25 PROCEDURE — 25000003 PHARM REV CODE 250: Performed by: FAMILY MEDICINE

## 2021-04-25 PROCEDURE — 85025 COMPLETE CBC W/AUTO DIFF WBC: CPT | Performed by: INTERNAL MEDICINE

## 2021-04-25 PROCEDURE — 99223 PR INITIAL HOSPITAL CARE,LEVL III: ICD-10-PCS | Mod: 57,ICN,, | Performed by: ORTHOPAEDIC SURGERY

## 2021-04-25 PROCEDURE — 25000003 PHARM REV CODE 250: Performed by: NURSE ANESTHETIST, CERTIFIED REGISTERED

## 2021-04-25 PROCEDURE — 36000710: Performed by: ORTHOPAEDIC SURGERY

## 2021-04-25 PROCEDURE — 63600175 PHARM REV CODE 636 W HCPCS: Performed by: ORTHOPAEDIC SURGERY

## 2021-04-25 PROCEDURE — 63600175 PHARM REV CODE 636 W HCPCS: Performed by: NURSE ANESTHETIST, CERTIFIED REGISTERED

## 2021-04-25 PROCEDURE — 71000033 HC RECOVERY, INTIAL HOUR: Performed by: ORTHOPAEDIC SURGERY

## 2021-04-25 PROCEDURE — 25000003 PHARM REV CODE 250: Performed by: ANESTHESIOLOGY

## 2021-04-25 PROCEDURE — 27245 TREAT THIGH FRACTURE: CPT | Mod: LT,,, | Performed by: ORTHOPAEDIC SURGERY

## 2021-04-25 PROCEDURE — S0020 INJECTION, BUPIVICAINE HYDRO: HCPCS | Performed by: ANESTHESIOLOGY

## 2021-04-25 PROCEDURE — 11000001 HC ACUTE MED/SURG PRIVATE ROOM

## 2021-04-25 PROCEDURE — 63600175 PHARM REV CODE 636 W HCPCS: Performed by: INTERNAL MEDICINE

## 2021-04-25 PROCEDURE — 25000003 PHARM REV CODE 250: Performed by: NURSE PRACTITIONER

## 2021-04-25 PROCEDURE — 63600175 PHARM REV CODE 636 W HCPCS: Performed by: NURSE PRACTITIONER

## 2021-04-25 PROCEDURE — 25000003 PHARM REV CODE 250: Performed by: INTERNAL MEDICINE

## 2021-04-25 PROCEDURE — 27200688 HC TRAY, SPINAL-HYPER/ ISOBARIC: Performed by: ANESTHESIOLOGY

## 2021-04-25 PROCEDURE — 99223 1ST HOSP IP/OBS HIGH 75: CPT | Mod: 57,ICN,, | Performed by: ORTHOPAEDIC SURGERY

## 2021-04-25 PROCEDURE — 84100 ASSAY OF PHOSPHORUS: CPT | Performed by: NURSE PRACTITIONER

## 2021-04-25 PROCEDURE — 27245 PR OPEN FIX INTER/SUBTROCH FX,IMPLNT: ICD-10-PCS | Mod: LT,,, | Performed by: ORTHOPAEDIC SURGERY

## 2021-04-25 PROCEDURE — C1769 GUIDE WIRE: HCPCS | Performed by: ORTHOPAEDIC SURGERY

## 2021-04-25 PROCEDURE — 37000008 HC ANESTHESIA 1ST 15 MINUTES: Performed by: ORTHOPAEDIC SURGERY

## 2021-04-25 DEVICE — IMPLANTABLE DEVICE: Type: IMPLANTABLE DEVICE | Site: HIP | Status: FUNCTIONAL

## 2021-04-25 DEVICE — SCREW TFN ADVANCE 100MM: Type: IMPLANTABLE DEVICE | Site: HIP | Status: FUNCTIONAL

## 2021-04-25 DEVICE — SCREW STRDRV REC T25 5X44 TTNM: Type: IMPLANTABLE DEVICE | Site: HIP | Status: FUNCTIONAL

## 2021-04-25 RX ORDER — ONDANSETRON 2 MG/ML
4 INJECTION INTRAMUSCULAR; INTRAVENOUS DAILY PRN
Status: DISCONTINUED | OUTPATIENT
Start: 2021-04-25 | End: 2021-04-25

## 2021-04-25 RX ORDER — EPHEDRINE SULFATE 50 MG/ML
INJECTION, SOLUTION INTRAVENOUS
Status: DISCONTINUED | OUTPATIENT
Start: 2021-04-25 | End: 2021-04-25

## 2021-04-25 RX ORDER — PROPOFOL 10 MG/ML
VIAL (ML) INTRAVENOUS CONTINUOUS PRN
Status: DISCONTINUED | OUTPATIENT
Start: 2021-04-25 | End: 2021-04-25

## 2021-04-25 RX ORDER — KETOROLAC TROMETHAMINE 30 MG/ML
15 INJECTION, SOLUTION INTRAMUSCULAR; INTRAVENOUS EVERY 8 HOURS PRN
Status: DISCONTINUED | OUTPATIENT
Start: 2021-04-25 | End: 2021-04-25

## 2021-04-25 RX ORDER — ENOXAPARIN SODIUM 100 MG/ML
40 INJECTION SUBCUTANEOUS EVERY 24 HOURS
Status: DISCONTINUED | OUTPATIENT
Start: 2021-04-25 | End: 2021-04-27 | Stop reason: HOSPADM

## 2021-04-25 RX ORDER — VANCOMYCIN HYDROCHLORIDE 1 G/20ML
INJECTION, POWDER, LYOPHILIZED, FOR SOLUTION INTRAVENOUS
Status: DISCONTINUED | OUTPATIENT
Start: 2021-04-25 | End: 2021-04-25 | Stop reason: HOSPADM

## 2021-04-25 RX ORDER — CEFAZOLIN SODIUM 1 G/3ML
INJECTION, POWDER, FOR SOLUTION INTRAMUSCULAR; INTRAVENOUS
Status: DISCONTINUED | OUTPATIENT
Start: 2021-04-25 | End: 2021-04-25

## 2021-04-25 RX ORDER — OXYCODONE AND ACETAMINOPHEN 5; 325 MG/1; MG/1
1 TABLET ORAL
Status: DISCONTINUED | OUTPATIENT
Start: 2021-04-25 | End: 2021-04-25

## 2021-04-25 RX ORDER — PROPOFOL 10 MG/ML
VIAL (ML) INTRAVENOUS
Status: DISCONTINUED | OUTPATIENT
Start: 2021-04-25 | End: 2021-04-25

## 2021-04-25 RX ORDER — CEFAZOLIN SODIUM 2 G/50ML
2 SOLUTION INTRAVENOUS
Status: COMPLETED | OUTPATIENT
Start: 2021-04-25 | End: 2021-04-26

## 2021-04-25 RX ORDER — LIDOCAINE HYDROCHLORIDE 10 MG/ML
INJECTION, SOLUTION EPIDURAL; INFILTRATION; INTRACAUDAL; PERINEURAL
Status: DISCONTINUED | OUTPATIENT
Start: 2021-04-25 | End: 2021-04-25

## 2021-04-25 RX ORDER — PHENYLEPHRINE HYDROCHLORIDE 10 MG/ML
INJECTION INTRAVENOUS
Status: DISCONTINUED | OUTPATIENT
Start: 2021-04-25 | End: 2021-04-25

## 2021-04-25 RX ORDER — BUPIVACAINE HYDROCHLORIDE 7.5 MG/ML
INJECTION, SOLUTION EPIDURAL; RETROBULBAR
Status: COMPLETED | OUTPATIENT
Start: 2021-04-25 | End: 2021-04-25

## 2021-04-25 RX ORDER — SODIUM CHLORIDE, SODIUM LACTATE, POTASSIUM CHLORIDE, CALCIUM CHLORIDE 600; 310; 30; 20 MG/100ML; MG/100ML; MG/100ML; MG/100ML
INJECTION, SOLUTION INTRAVENOUS CONTINUOUS PRN
Status: DISCONTINUED | OUTPATIENT
Start: 2021-04-25 | End: 2021-04-25

## 2021-04-25 RX ORDER — HYDROMORPHONE HYDROCHLORIDE 2 MG/ML
0.2 INJECTION, SOLUTION INTRAMUSCULAR; INTRAVENOUS; SUBCUTANEOUS EVERY 5 MIN PRN
Status: DISCONTINUED | OUTPATIENT
Start: 2021-04-25 | End: 2021-04-25

## 2021-04-25 RX ORDER — MUPIROCIN 20 MG/G
OINTMENT TOPICAL 2 TIMES DAILY
Status: DISCONTINUED | OUTPATIENT
Start: 2021-04-25 | End: 2021-04-27 | Stop reason: HOSPADM

## 2021-04-25 RX ADMIN — LIDOCAINE HYDROCHLORIDE 50 MG: 10 INJECTION, SOLUTION EPIDURAL; INFILTRATION; INTRACAUDAL; PERINEURAL at 08:04

## 2021-04-25 RX ADMIN — MORPHINE SULFATE 4 MG: 4 INJECTION INTRAVENOUS at 05:04

## 2021-04-25 RX ADMIN — INSULIN ASPART 4 UNITS: 100 INJECTION, SOLUTION INTRAVENOUS; SUBCUTANEOUS at 04:04

## 2021-04-25 RX ADMIN — INSULIN ASPART 2 UNITS: 100 INJECTION, SOLUTION INTRAVENOUS; SUBCUTANEOUS at 04:04

## 2021-04-25 RX ADMIN — PHENYLEPHRINE HYDROCHLORIDE 200 MCG: 10 INJECTION INTRAVENOUS at 09:04

## 2021-04-25 RX ADMIN — CEFAZOLIN SODIUM 2 G: 2 SOLUTION INTRAVENOUS at 04:04

## 2021-04-25 RX ADMIN — MUPIROCIN: 20 OINTMENT TOPICAL at 10:04

## 2021-04-25 RX ADMIN — ATORVASTATIN CALCIUM 20 MG: 10 TABLET, FILM COATED ORAL at 10:04

## 2021-04-25 RX ADMIN — PROPOFOL 50 MCG/KG/MIN: 10 INJECTION, EMULSION INTRAVENOUS at 09:04

## 2021-04-25 RX ADMIN — PROPOFOL 50 MG: 10 INJECTION, EMULSION INTRAVENOUS at 08:04

## 2021-04-25 RX ADMIN — CEFAZOLIN 2 G: 1 INJECTION, POWDER, FOR SOLUTION INTRAMUSCULAR; INTRAVENOUS at 09:04

## 2021-04-25 RX ADMIN — MORPHINE SULFATE 4 MG: 4 INJECTION INTRAVENOUS at 04:04

## 2021-04-25 RX ADMIN — GLYCOPYRROLATE 0.2 MG: 0.2 INJECTION, SOLUTION INTRAMUSCULAR; INTRAVENOUS at 09:04

## 2021-04-25 RX ADMIN — EPHEDRINE SULFATE 10 MG: 50 INJECTION INTRAVENOUS at 09:04

## 2021-04-25 RX ADMIN — MORPHINE SULFATE 4 MG: 4 INJECTION INTRAVENOUS at 10:04

## 2021-04-25 RX ADMIN — BUPIVACAINE HYDROCHLORIDE 1.6 ML: 7.5 INJECTION, SOLUTION EPIDURAL; RETROBULBAR at 08:04

## 2021-04-25 RX ADMIN — CARVEDILOL 25 MG: 12.5 TABLET, FILM COATED ORAL at 04:04

## 2021-04-25 RX ADMIN — ENOXAPARIN SODIUM 40 MG: 40 INJECTION SUBCUTANEOUS at 10:04

## 2021-04-25 RX ADMIN — MORPHINE SULFATE 4 MG: 4 INJECTION INTRAVENOUS at 01:04

## 2021-04-25 RX ADMIN — SODIUM CHLORIDE, SODIUM LACTATE, POTASSIUM CHLORIDE, AND CALCIUM CHLORIDE: 600; 310; 30; 20 INJECTION, SOLUTION INTRAVENOUS at 08:04

## 2021-04-25 RX ADMIN — HYDRALAZINE HYDROCHLORIDE 10 MG: 20 INJECTION INTRAMUSCULAR; INTRAVENOUS at 04:04

## 2021-04-26 LAB
ALBUMIN SERPL BCP-MCNC: 2.7 G/DL (ref 3.5–5.2)
ALP SERPL-CCNC: 85 U/L (ref 55–135)
ALT SERPL W/O P-5'-P-CCNC: 37 U/L (ref 10–44)
ANION GAP SERPL CALC-SCNC: 7 MMOL/L (ref 8–16)
AST SERPL-CCNC: 27 U/L (ref 10–40)
BASOPHILS # BLD AUTO: 0.02 K/UL (ref 0–0.2)
BASOPHILS NFR BLD: 0.3 % (ref 0–1.9)
BILIRUB SERPL-MCNC: 0.6 MG/DL (ref 0.1–1)
BLD PROD TYP BPU: NORMAL
BLOOD UNIT EXPIRATION DATE: NORMAL
BLOOD UNIT TYPE CODE: 600
BLOOD UNIT TYPE: NORMAL
BUN SERPL-MCNC: 14 MG/DL (ref 8–23)
CALCIUM SERPL-MCNC: 8.1 MG/DL (ref 8.7–10.5)
CHLORIDE SERPL-SCNC: 100 MMOL/L (ref 95–110)
CO2 SERPL-SCNC: 25 MMOL/L (ref 23–29)
CODING SYSTEM: NORMAL
CREAT SERPL-MCNC: 0.7 MG/DL (ref 0.5–1.4)
DIFFERENTIAL METHOD: ABNORMAL
DISPENSE STATUS: NORMAL
EOSINOPHIL # BLD AUTO: 0 K/UL (ref 0–0.5)
EOSINOPHIL NFR BLD: 0.1 % (ref 0–8)
ERYTHROCYTE [DISTWIDTH] IN BLOOD BY AUTOMATED COUNT: 15.1 % (ref 11.5–14.5)
EST. GFR  (AFRICAN AMERICAN): >60 ML/MIN/1.73 M^2
EST. GFR  (NON AFRICAN AMERICAN): >60 ML/MIN/1.73 M^2
GLUCOSE SERPL-MCNC: 194 MG/DL (ref 70–110)
HCT VFR BLD AUTO: 27 % (ref 37–48.5)
HGB BLD-MCNC: 7.7 G/DL (ref 12–16)
IMM GRANULOCYTES # BLD AUTO: 0.03 K/UL (ref 0–0.04)
IMM GRANULOCYTES NFR BLD AUTO: 0.4 % (ref 0–0.5)
LYMPHOCYTES # BLD AUTO: 1.2 K/UL (ref 1–4.8)
LYMPHOCYTES NFR BLD: 16.4 % (ref 18–48)
MAGNESIUM SERPL-MCNC: 1.7 MG/DL (ref 1.6–2.6)
MCH RBC QN AUTO: 24.8 PG (ref 27–31)
MCHC RBC AUTO-ENTMCNC: 28.5 G/DL (ref 32–36)
MCV RBC AUTO: 87 FL (ref 82–98)
MONOCYTES # BLD AUTO: 0.6 K/UL (ref 0.3–1)
MONOCYTES NFR BLD: 7.8 % (ref 4–15)
NEUTROPHILS # BLD AUTO: 5.6 K/UL (ref 1.8–7.7)
NEUTROPHILS NFR BLD: 75 % (ref 38–73)
NRBC BLD-RTO: 0 /100 WBC
NUM UNITS TRANS PACKED RBC: NORMAL
PHOSPHATE SERPL-MCNC: 2.7 MG/DL (ref 2.7–4.5)
PLATELET # BLD AUTO: 186 K/UL (ref 150–450)
PMV BLD AUTO: 10.3 FL (ref 9.2–12.9)
POCT GLUCOSE: 226 MG/DL (ref 70–110)
POCT GLUCOSE: 226 MG/DL (ref 70–110)
POCT GLUCOSE: 246 MG/DL (ref 70–110)
POCT GLUCOSE: 258 MG/DL (ref 70–110)
POCT GLUCOSE: 311 MG/DL (ref 70–110)
POTASSIUM SERPL-SCNC: 4 MMOL/L (ref 3.5–5.1)
PROT SERPL-MCNC: 5.5 G/DL (ref 6–8.4)
RBC # BLD AUTO: 3.1 M/UL (ref 4–5.4)
SODIUM SERPL-SCNC: 132 MMOL/L (ref 136–145)
WBC # BLD AUTO: 7.44 K/UL (ref 3.9–12.7)

## 2021-04-26 PROCEDURE — 36430 TRANSFUSION BLD/BLD COMPNT: CPT

## 2021-04-26 PROCEDURE — 86920 COMPATIBILITY TEST SPIN: CPT | Performed by: NURSE PRACTITIONER

## 2021-04-26 PROCEDURE — 25000003 PHARM REV CODE 250: Performed by: FAMILY MEDICINE

## 2021-04-26 PROCEDURE — 97167 OT EVAL HIGH COMPLEX 60 MIN: CPT

## 2021-04-26 PROCEDURE — 63600175 PHARM REV CODE 636 W HCPCS: Performed by: NURSE PRACTITIONER

## 2021-04-26 PROCEDURE — 97163 PT EVAL HIGH COMPLEX 45 MIN: CPT

## 2021-04-26 PROCEDURE — 36415 COLL VENOUS BLD VENIPUNCTURE: CPT | Performed by: NURSE PRACTITIONER

## 2021-04-26 PROCEDURE — 84100 ASSAY OF PHOSPHORUS: CPT | Performed by: NURSE PRACTITIONER

## 2021-04-26 PROCEDURE — P9016 RBC LEUKOCYTES REDUCED: HCPCS | Performed by: NURSE PRACTITIONER

## 2021-04-26 PROCEDURE — 85025 COMPLETE CBC W/AUTO DIFF WBC: CPT | Performed by: ORTHOPAEDIC SURGERY

## 2021-04-26 PROCEDURE — 63600175 PHARM REV CODE 636 W HCPCS: Performed by: INTERNAL MEDICINE

## 2021-04-26 PROCEDURE — 25000003 PHARM REV CODE 250: Performed by: NURSE PRACTITIONER

## 2021-04-26 PROCEDURE — 83735 ASSAY OF MAGNESIUM: CPT | Performed by: NURSE PRACTITIONER

## 2021-04-26 PROCEDURE — 11000001 HC ACUTE MED/SURG PRIVATE ROOM

## 2021-04-26 PROCEDURE — C9399 UNCLASSIFIED DRUGS OR BIOLOG: HCPCS | Performed by: NURSE PRACTITIONER

## 2021-04-26 PROCEDURE — 97530 THERAPEUTIC ACTIVITIES: CPT

## 2021-04-26 PROCEDURE — 80053 COMPREHEN METABOLIC PANEL: CPT | Performed by: NURSE PRACTITIONER

## 2021-04-26 PROCEDURE — 25000003 PHARM REV CODE 250: Performed by: INTERNAL MEDICINE

## 2021-04-26 PROCEDURE — 63600175 PHARM REV CODE 636 W HCPCS: Performed by: ORTHOPAEDIC SURGERY

## 2021-04-26 RX ORDER — HYDROCODONE BITARTRATE AND ACETAMINOPHEN 10; 325 MG/1; MG/1
1 TABLET ORAL EVERY 6 HOURS PRN
Status: DISCONTINUED | OUTPATIENT
Start: 2021-04-26 | End: 2021-04-27

## 2021-04-26 RX ORDER — LIDOCAINE 50 MG/G
1 PATCH TOPICAL DAILY
Status: DISCONTINUED | OUTPATIENT
Start: 2021-04-26 | End: 2021-04-27 | Stop reason: HOSPADM

## 2021-04-26 RX ORDER — NAPROXEN SODIUM 220 MG/1
81 TABLET, FILM COATED ORAL DAILY
Status: DISCONTINUED | OUTPATIENT
Start: 2021-04-27 | End: 2021-04-27 | Stop reason: HOSPADM

## 2021-04-26 RX ORDER — MORPHINE SULFATE 4 MG/ML
2 INJECTION, SOLUTION INTRAMUSCULAR; INTRAVENOUS EVERY 4 HOURS PRN
Status: DISCONTINUED | OUTPATIENT
Start: 2021-04-26 | End: 2021-04-26

## 2021-04-26 RX ORDER — LOSARTAN POTASSIUM 50 MG/1
50 TABLET ORAL DAILY
Status: DISCONTINUED | OUTPATIENT
Start: 2021-04-26 | End: 2021-04-27 | Stop reason: HOSPADM

## 2021-04-26 RX ORDER — HYDROCODONE BITARTRATE AND ACETAMINOPHEN 500; 5 MG/1; MG/1
TABLET ORAL
Status: DISCONTINUED | OUTPATIENT
Start: 2021-04-26 | End: 2021-04-27 | Stop reason: HOSPADM

## 2021-04-26 RX ORDER — OXYCODONE AND ACETAMINOPHEN 5; 325 MG/1; MG/1
1 TABLET ORAL ONCE
Status: COMPLETED | OUTPATIENT
Start: 2021-04-26 | End: 2021-04-26

## 2021-04-26 RX ADMIN — ATORVASTATIN CALCIUM 20 MG: 10 TABLET, FILM COATED ORAL at 08:04

## 2021-04-26 RX ADMIN — CARVEDILOL 25 MG: 12.5 TABLET, FILM COATED ORAL at 09:04

## 2021-04-26 RX ADMIN — LIDOCAINE 1 PATCH: 50 PATCH CUTANEOUS at 04:04

## 2021-04-26 RX ADMIN — LEVOTHYROXINE SODIUM 100 MCG: 100 TABLET ORAL at 06:04

## 2021-04-26 RX ADMIN — INSULIN ASPART 2 UNITS: 100 INJECTION, SOLUTION INTRAVENOUS; SUBCUTANEOUS at 12:04

## 2021-04-26 RX ADMIN — INSULIN ASPART 4 UNITS: 100 INJECTION, SOLUTION INTRAVENOUS; SUBCUTANEOUS at 06:04

## 2021-04-26 RX ADMIN — INSULIN DETEMIR 5 UNITS: 100 INJECTION, SOLUTION SUBCUTANEOUS at 08:04

## 2021-04-26 RX ADMIN — OXYCODONE HYDROCHLORIDE AND ACETAMINOPHEN 1 TABLET: 5; 325 TABLET ORAL at 03:04

## 2021-04-26 RX ADMIN — HYDROCODONE BITARTRATE AND ACETAMINOPHEN 1 TABLET: 10; 325 TABLET ORAL at 08:04

## 2021-04-26 RX ADMIN — LIDOCAINE 1 PATCH: 50 PATCH CUTANEOUS at 12:04

## 2021-04-26 RX ADMIN — MORPHINE SULFATE 4 MG: 4 INJECTION INTRAVENOUS at 01:04

## 2021-04-26 RX ADMIN — INSULIN ASPART 6 UNITS: 100 INJECTION, SOLUTION INTRAVENOUS; SUBCUTANEOUS at 01:04

## 2021-04-26 RX ADMIN — LOSARTAN POTASSIUM 50 MG: 50 TABLET, FILM COATED ORAL at 05:04

## 2021-04-26 RX ADMIN — CEFAZOLIN SODIUM 2 G: 2 SOLUTION INTRAVENOUS at 09:04

## 2021-04-26 RX ADMIN — CARVEDILOL 25 MG: 12.5 TABLET, FILM COATED ORAL at 05:04

## 2021-04-26 RX ADMIN — ENOXAPARIN SODIUM 40 MG: 40 INJECTION SUBCUTANEOUS at 05:04

## 2021-04-26 RX ADMIN — MUPIROCIN: 20 OINTMENT TOPICAL at 08:04

## 2021-04-26 RX ADMIN — CEFAZOLIN SODIUM 2 G: 2 SOLUTION INTRAVENOUS at 12:04

## 2021-04-26 RX ADMIN — MUPIROCIN: 20 OINTMENT TOPICAL at 09:04

## 2021-04-27 VITALS
OXYGEN SATURATION: 98 % | RESPIRATION RATE: 18 BRPM | BODY MASS INDEX: 32.01 KG/M2 | DIASTOLIC BLOOD PRESSURE: 72 MMHG | TEMPERATURE: 99 F | HEIGHT: 62 IN | HEART RATE: 71 BPM | WEIGHT: 173.94 LBS | SYSTOLIC BLOOD PRESSURE: 163 MMHG

## 2021-04-27 LAB
ALBUMIN SERPL BCP-MCNC: 2.3 G/DL (ref 3.5–5.2)
ALP SERPL-CCNC: 66 U/L (ref 55–135)
ALT SERPL W/O P-5'-P-CCNC: 19 U/L (ref 10–44)
ANION GAP SERPL CALC-SCNC: 8 MMOL/L (ref 8–16)
AST SERPL-CCNC: 15 U/L (ref 10–40)
BASOPHILS # BLD AUTO: 0.02 K/UL (ref 0–0.2)
BASOPHILS NFR BLD: 0.3 % (ref 0–1.9)
BILIRUB SERPL-MCNC: 0.8 MG/DL (ref 0.1–1)
BUN SERPL-MCNC: 12 MG/DL (ref 8–23)
CALCIUM SERPL-MCNC: 8.2 MG/DL (ref 8.7–10.5)
CHLORIDE SERPL-SCNC: 101 MMOL/L (ref 95–110)
CO2 SERPL-SCNC: 25 MMOL/L (ref 23–29)
CREAT SERPL-MCNC: 0.6 MG/DL (ref 0.5–1.4)
DIFFERENTIAL METHOD: ABNORMAL
EOSINOPHIL # BLD AUTO: 0.1 K/UL (ref 0–0.5)
EOSINOPHIL NFR BLD: 1.3 % (ref 0–8)
ERYTHROCYTE [DISTWIDTH] IN BLOOD BY AUTOMATED COUNT: 15.1 % (ref 11.5–14.5)
EST. GFR  (AFRICAN AMERICAN): >60 ML/MIN/1.73 M^2
EST. GFR  (NON AFRICAN AMERICAN): >60 ML/MIN/1.73 M^2
GLUCOSE SERPL-MCNC: 255 MG/DL (ref 70–110)
HCT VFR BLD AUTO: 26.9 % (ref 37–48.5)
HGB BLD-MCNC: 8.5 G/DL (ref 12–16)
IMM GRANULOCYTES # BLD AUTO: 0.05 K/UL (ref 0–0.04)
IMM GRANULOCYTES NFR BLD AUTO: 0.7 % (ref 0–0.5)
LYMPHOCYTES # BLD AUTO: 1 K/UL (ref 1–4.8)
LYMPHOCYTES NFR BLD: 14 % (ref 18–48)
MCH RBC QN AUTO: 26.1 PG (ref 27–31)
MCHC RBC AUTO-ENTMCNC: 31.6 G/DL (ref 32–36)
MCV RBC AUTO: 83 FL (ref 82–98)
MONOCYTES # BLD AUTO: 0.6 K/UL (ref 0.3–1)
MONOCYTES NFR BLD: 8 % (ref 4–15)
NEUTROPHILS # BLD AUTO: 5.3 K/UL (ref 1.8–7.7)
NEUTROPHILS NFR BLD: 75.7 % (ref 38–73)
NRBC BLD-RTO: 0 /100 WBC
PLATELET # BLD AUTO: 143 K/UL (ref 150–450)
PMV BLD AUTO: 11 FL (ref 9.2–12.9)
POCT GLUCOSE: 162 MG/DL (ref 70–110)
POCT GLUCOSE: 224 MG/DL (ref 70–110)
POCT GLUCOSE: 271 MG/DL (ref 70–110)
POTASSIUM SERPL-SCNC: 3.8 MMOL/L (ref 3.5–5.1)
PROT SERPL-MCNC: 5.3 G/DL (ref 6–8.4)
RBC # BLD AUTO: 3.26 M/UL (ref 4–5.4)
SODIUM SERPL-SCNC: 134 MMOL/L (ref 136–145)
WBC # BLD AUTO: 7 K/UL (ref 3.9–12.7)

## 2021-04-27 PROCEDURE — 63600175 PHARM REV CODE 636 W HCPCS: Performed by: NURSE PRACTITIONER

## 2021-04-27 PROCEDURE — 36415 COLL VENOUS BLD VENIPUNCTURE: CPT | Performed by: NURSE PRACTITIONER

## 2021-04-27 PROCEDURE — 25000003 PHARM REV CODE 250: Performed by: NURSE PRACTITIONER

## 2021-04-27 PROCEDURE — 97530 THERAPEUTIC ACTIVITIES: CPT

## 2021-04-27 PROCEDURE — 99024 PR POST-OP FOLLOW-UP VISIT: ICD-10-PCS | Mod: ,,, | Performed by: PHYSICIAN ASSISTANT

## 2021-04-27 PROCEDURE — 99024 POSTOP FOLLOW-UP VISIT: CPT | Mod: ,,, | Performed by: PHYSICIAN ASSISTANT

## 2021-04-27 PROCEDURE — 63600175 PHARM REV CODE 636 W HCPCS: Performed by: INTERNAL MEDICINE

## 2021-04-27 PROCEDURE — 80053 COMPREHEN METABOLIC PANEL: CPT | Performed by: NURSE PRACTITIONER

## 2021-04-27 PROCEDURE — 25000003 PHARM REV CODE 250: Performed by: INTERNAL MEDICINE

## 2021-04-27 PROCEDURE — 85025 COMPLETE CBC W/AUTO DIFF WBC: CPT | Performed by: NURSE PRACTITIONER

## 2021-04-27 PROCEDURE — 97535 SELF CARE MNGMENT TRAINING: CPT

## 2021-04-27 PROCEDURE — 97110 THERAPEUTIC EXERCISES: CPT

## 2021-04-27 RX ORDER — FOLIC ACID 1 MG/1
1 TABLET ORAL DAILY
Status: DISCONTINUED | OUTPATIENT
Start: 2021-04-27 | End: 2021-04-27 | Stop reason: HOSPADM

## 2021-04-27 RX ORDER — MUPIROCIN 20 MG/G
OINTMENT TOPICAL 2 TIMES DAILY
Start: 2021-04-27

## 2021-04-27 RX ORDER — CHLORDIAZEPOXIDE HYDROCHLORIDE 10 MG/1
10 CAPSULE, GELATIN COATED ORAL NIGHTLY
Qty: 5 CAPSULE | Refills: 0
Start: 2021-04-27 | End: 2021-04-27

## 2021-04-27 RX ORDER — CHLORDIAZEPOXIDE HYDROCHLORIDE 10 MG/1
10 CAPSULE, GELATIN COATED ORAL NIGHTLY
Qty: 5 CAPSULE | Refills: 0 | Status: SHIPPED | OUTPATIENT
Start: 2021-04-27 | End: 2021-05-02

## 2021-04-27 RX ORDER — INSULIN ASPART 100 [IU]/ML
0-5 INJECTION, SOLUTION INTRAVENOUS; SUBCUTANEOUS
Refills: 0
Start: 2021-04-27 | End: 2022-04-27

## 2021-04-27 RX ORDER — HYDROCODONE BITARTRATE AND ACETAMINOPHEN 5; 325 MG/1; MG/1
1 TABLET ORAL EVERY 6 HOURS PRN
Status: DISCONTINUED | OUTPATIENT
Start: 2021-04-27 | End: 2021-04-27 | Stop reason: HOSPADM

## 2021-04-27 RX ORDER — THIAMINE HCL 100 MG
100 TABLET ORAL DAILY
Status: DISCONTINUED | OUTPATIENT
Start: 2021-04-27 | End: 2021-04-27 | Stop reason: HOSPADM

## 2021-04-27 RX ORDER — CHLORDIAZEPOXIDE HYDROCHLORIDE 10 MG/1
10 CAPSULE, GELATIN COATED ORAL 2 TIMES DAILY
Status: DISCONTINUED | OUTPATIENT
Start: 2021-04-27 | End: 2021-04-27 | Stop reason: HOSPADM

## 2021-04-27 RX ORDER — LANOLIN ALCOHOL/MO/W.PET/CERES
100 CREAM (GRAM) TOPICAL DAILY
Start: 2021-04-28

## 2021-04-27 RX ORDER — INSULIN ASPART 100 [IU]/ML
0-5 INJECTION, SOLUTION INTRAVENOUS; SUBCUTANEOUS
Status: DISCONTINUED | OUTPATIENT
Start: 2021-04-27 | End: 2021-04-27 | Stop reason: HOSPADM

## 2021-04-27 RX ORDER — CARVEDILOL 25 MG/1
25 TABLET ORAL 2 TIMES DAILY
Start: 2021-04-27

## 2021-04-27 RX ORDER — FOLIC ACID 1 MG/1
1 TABLET ORAL DAILY
Qty: 30 TABLET | Refills: 0
Start: 2021-04-28 | End: 2022-04-28

## 2021-04-27 RX ORDER — ENOXAPARIN SODIUM 100 MG/ML
40 INJECTION SUBCUTANEOUS DAILY
Qty: 12 ML | Refills: 0
Start: 2021-04-27 | End: 2021-04-27 | Stop reason: HOSPADM

## 2021-04-27 RX ORDER — HYDROCODONE BITARTRATE AND ACETAMINOPHEN 5; 325 MG/1; MG/1
1 TABLET ORAL EVERY 6 HOURS PRN
Qty: 10 TABLET | Refills: 0 | Status: SHIPPED | OUTPATIENT
Start: 2021-04-27 | End: 2021-06-02

## 2021-04-27 RX ORDER — IBUPROFEN 200 MG
16 TABLET ORAL
Status: DISCONTINUED | OUTPATIENT
Start: 2021-04-27 | End: 2021-04-27 | Stop reason: HOSPADM

## 2021-04-27 RX ORDER — NAPROXEN SODIUM 220 MG/1
81 TABLET, FILM COATED ORAL DAILY
Refills: 0
Start: 2021-04-28 | End: 2022-04-28

## 2021-04-27 RX ORDER — GLUCAGON 1 MG
1 KIT INJECTION
Status: DISCONTINUED | OUTPATIENT
Start: 2021-04-27 | End: 2021-04-27 | Stop reason: HOSPADM

## 2021-04-27 RX ORDER — LIDOCAINE 50 MG/G
1 PATCH TOPICAL DAILY
Refills: 0
Start: 2021-04-27

## 2021-04-27 RX ORDER — IBUPROFEN 200 MG
24 TABLET ORAL
Status: DISCONTINUED | OUTPATIENT
Start: 2021-04-27 | End: 2021-04-27 | Stop reason: HOSPADM

## 2021-04-27 RX ORDER — LOSARTAN POTASSIUM 100 MG/1
100 TABLET ORAL DAILY
Start: 2021-04-27

## 2021-04-27 RX ORDER — ATORVASTATIN CALCIUM 20 MG/1
20 TABLET, FILM COATED ORAL NIGHTLY
Start: 2021-04-27

## 2021-04-27 RX ORDER — HYDROCODONE BITARTRATE AND ACETAMINOPHEN 5; 325 MG/1; MG/1
1 TABLET ORAL EVERY 6 HOURS PRN
Qty: 10 TABLET | Refills: 0
Start: 2021-04-27 | End: 2021-04-27

## 2021-04-27 RX ADMIN — ASPIRIN 81 MG: 81 TABLET, CHEWABLE ORAL at 08:04

## 2021-04-27 RX ADMIN — MUPIROCIN: 20 OINTMENT TOPICAL at 08:04

## 2021-04-27 RX ADMIN — LEVOTHYROXINE SODIUM 100 MCG: 100 TABLET ORAL at 06:04

## 2021-04-27 RX ADMIN — HYDRALAZINE HYDROCHLORIDE 10 MG: 20 INJECTION INTRAMUSCULAR; INTRAVENOUS at 06:04

## 2021-04-27 RX ADMIN — INSULIN ASPART 3 UNITS: 100 INJECTION, SOLUTION INTRAVENOUS; SUBCUTANEOUS at 11:04

## 2021-04-27 RX ADMIN — INSULIN ASPART 2 UNITS: 100 INJECTION, SOLUTION INTRAVENOUS; SUBCUTANEOUS at 12:04

## 2021-04-27 RX ADMIN — FOLIC ACID 1 MG: 1 TABLET ORAL at 08:04

## 2021-04-27 RX ADMIN — HYDROCODONE BITARTRATE AND ACETAMINOPHEN 1 TABLET: 10; 325 TABLET ORAL at 04:04

## 2021-04-27 RX ADMIN — CARVEDILOL 25 MG: 12.5 TABLET, FILM COATED ORAL at 08:04

## 2021-04-27 RX ADMIN — LOSARTAN POTASSIUM 50 MG: 50 TABLET, FILM COATED ORAL at 08:04

## 2021-04-27 RX ADMIN — INSULIN ASPART 2 UNITS: 100 INJECTION, SOLUTION INTRAVENOUS; SUBCUTANEOUS at 06:04

## 2021-04-27 RX ADMIN — LIDOCAINE 1 PATCH: 50 PATCH CUTANEOUS at 03:04

## 2021-04-27 RX ADMIN — CHLORDIAZEPOXIDE HYDROCHLORIDE 10 MG: 10 CAPSULE ORAL at 08:04

## 2021-04-27 RX ADMIN — Medication 100 MG: at 08:04

## 2021-04-28 PROBLEM — F10.10 ALCOHOL ABUSE, DAILY USE: Status: ACTIVE | Noted: 2021-04-28

## 2021-05-05 ENCOUNTER — TELEPHONE (OUTPATIENT)
Dept: ORTHOPEDICS | Facility: CLINIC | Age: 76
End: 2021-05-05

## 2021-05-05 DIAGNOSIS — M89.8X5 PAIN OF LEFT FEMUR: Primary | ICD-10-CM

## 2021-05-10 ENCOUNTER — OFFICE VISIT (OUTPATIENT)
Dept: ORTHOPEDICS | Facility: CLINIC | Age: 76
End: 2021-05-10
Payer: MEDICARE

## 2021-05-10 ENCOUNTER — HOSPITAL ENCOUNTER (OUTPATIENT)
Dept: RADIOLOGY | Facility: HOSPITAL | Age: 76
Discharge: HOME | End: 2021-05-10
Attending: ORTHOPAEDIC SURGERY
Payer: MEDICARE

## 2021-05-10 VITALS
HEART RATE: 62 BPM | SYSTOLIC BLOOD PRESSURE: 117 MMHG | DIASTOLIC BLOOD PRESSURE: 54 MMHG | WEIGHT: 173 LBS | BODY MASS INDEX: 31.83 KG/M2 | HEIGHT: 62 IN

## 2021-05-10 DIAGNOSIS — M79.672 FOOT PAIN, LEFT: ICD-10-CM

## 2021-05-10 DIAGNOSIS — M79.605 LEFT LEG PAIN: Primary | ICD-10-CM

## 2021-05-10 DIAGNOSIS — S72.142D CLOSED DISPLACED INTERTROCHANTERIC FRACTURE OF LEFT FEMUR WITH ROUTINE HEALING: Primary | ICD-10-CM

## 2021-05-10 DIAGNOSIS — M21.752 ACQUIRED UNEQUAL LIMB LENGTH OF LEFT FEMUR: ICD-10-CM

## 2021-05-10 DIAGNOSIS — M89.8X5 PAIN OF LEFT FEMUR: ICD-10-CM

## 2021-05-10 PROCEDURE — 99024 POSTOP FOLLOW-UP VISIT: CPT | Mod: S$GLB,,, | Performed by: ORTHOPAEDIC SURGERY

## 2021-05-10 PROCEDURE — 3288F PR FALLS RISK ASSESSMENT DOCUMENTED: ICD-10-PCS | Mod: CPTII,S$GLB,, | Performed by: ORTHOPAEDIC SURGERY

## 2021-05-10 PROCEDURE — 3288F FALL RISK ASSESSMENT DOCD: CPT | Mod: CPTII,S$GLB,, | Performed by: ORTHOPAEDIC SURGERY

## 2021-05-10 PROCEDURE — 73552 XR FEMUR 2 VIEW LEFT: ICD-10-PCS | Mod: 26,LT,, | Performed by: RADIOLOGY

## 2021-05-10 PROCEDURE — 1125F AMNT PAIN NOTED PAIN PRSNT: CPT | Mod: S$GLB,,, | Performed by: ORTHOPAEDIC SURGERY

## 2021-05-10 PROCEDURE — 73552 X-RAY EXAM OF FEMUR 2/>: CPT | Mod: 26,LT,, | Performed by: RADIOLOGY

## 2021-05-10 PROCEDURE — 1101F PR PT FALLS ASSESS DOC 0-1 FALLS W/OUT INJ PAST YR: ICD-10-PCS | Mod: CPTII,S$GLB,, | Performed by: ORTHOPAEDIC SURGERY

## 2021-05-10 PROCEDURE — 1125F PR PAIN SEVERITY QUANTIFIED, PAIN PRESENT: ICD-10-PCS | Mod: S$GLB,,, | Performed by: ORTHOPAEDIC SURGERY

## 2021-05-10 PROCEDURE — 1101F PT FALLS ASSESS-DOCD LE1/YR: CPT | Mod: CPTII,S$GLB,, | Performed by: ORTHOPAEDIC SURGERY

## 2021-05-10 PROCEDURE — 99024 PR POST-OP FOLLOW-UP VISIT: ICD-10-PCS | Mod: S$GLB,,, | Performed by: ORTHOPAEDIC SURGERY

## 2021-05-10 PROCEDURE — 99999 PR PBB SHADOW E&M-EST. PATIENT-LVL IV: CPT | Mod: PBBFAC,,, | Performed by: ORTHOPAEDIC SURGERY

## 2021-05-10 PROCEDURE — 73552 X-RAY EXAM OF FEMUR 2/>: CPT | Mod: TC,LT

## 2021-05-10 PROCEDURE — 99999 PR PBB SHADOW E&M-EST. PATIENT-LVL IV: ICD-10-PCS | Mod: PBBFAC,,, | Performed by: ORTHOPAEDIC SURGERY

## 2021-05-25 ENCOUNTER — TELEPHONE (OUTPATIENT)
Dept: ORTHOPEDICS | Facility: CLINIC | Age: 76
End: 2021-05-25

## 2021-06-02 ENCOUNTER — OFFICE VISIT (OUTPATIENT)
Dept: ORTHOPEDICS | Facility: CLINIC | Age: 76
End: 2021-06-02
Payer: MEDICARE

## 2021-06-02 ENCOUNTER — HOSPITAL ENCOUNTER (OUTPATIENT)
Dept: RADIOLOGY | Facility: HOSPITAL | Age: 76
Discharge: HOME | End: 2021-06-02
Attending: ORTHOPAEDIC SURGERY
Payer: MEDICARE

## 2021-06-02 VITALS
DIASTOLIC BLOOD PRESSURE: 74 MMHG | SYSTOLIC BLOOD PRESSURE: 128 MMHG | HEIGHT: 62 IN | BODY MASS INDEX: 31.83 KG/M2 | HEART RATE: 69 BPM | WEIGHT: 173 LBS

## 2021-06-02 DIAGNOSIS — M85.80 OSTEOPENIA AFTER MENOPAUSE: ICD-10-CM

## 2021-06-02 DIAGNOSIS — S72.142D CLOSED DISPLACED INTERTROCHANTERIC FRACTURE OF LEFT FEMUR WITH ROUTINE HEALING: Primary | ICD-10-CM

## 2021-06-02 DIAGNOSIS — Z78.0 OSTEOPENIA AFTER MENOPAUSE: ICD-10-CM

## 2021-06-02 DIAGNOSIS — M89.8X5 PAIN OF LEFT FEMUR: Primary | ICD-10-CM

## 2021-06-02 DIAGNOSIS — M79.605 LEFT LEG PAIN: ICD-10-CM

## 2021-06-02 PROCEDURE — 99999 PR PBB SHADOW E&M-EST. PATIENT-LVL V: ICD-10-PCS | Mod: PBBFAC,,,

## 2021-06-02 PROCEDURE — 73552 X-RAY EXAM OF FEMUR 2/>: CPT | Mod: TC,LT

## 2021-06-02 PROCEDURE — 99024 POSTOP FOLLOW-UP VISIT: CPT | Mod: S$GLB,,, | Performed by: ORTHOPAEDIC SURGERY

## 2021-06-02 PROCEDURE — 1125F PR PAIN SEVERITY QUANTIFIED, PAIN PRESENT: ICD-10-PCS | Mod: S$GLB,,, | Performed by: ORTHOPAEDIC SURGERY

## 2021-06-02 PROCEDURE — 1101F PT FALLS ASSESS-DOCD LE1/YR: CPT | Mod: CPTII,S$GLB,, | Performed by: ORTHOPAEDIC SURGERY

## 2021-06-02 PROCEDURE — 73552 XR FEMUR 2 VIEW LEFT: ICD-10-PCS | Mod: 26,LT,, | Performed by: RADIOLOGY

## 2021-06-02 PROCEDURE — 3288F PR FALLS RISK ASSESSMENT DOCUMENTED: ICD-10-PCS | Mod: CPTII,S$GLB,, | Performed by: ORTHOPAEDIC SURGERY

## 2021-06-02 PROCEDURE — 1101F PR PT FALLS ASSESS DOC 0-1 FALLS W/OUT INJ PAST YR: ICD-10-PCS | Mod: CPTII,S$GLB,, | Performed by: ORTHOPAEDIC SURGERY

## 2021-06-02 PROCEDURE — 1125F AMNT PAIN NOTED PAIN PRSNT: CPT | Mod: S$GLB,,, | Performed by: ORTHOPAEDIC SURGERY

## 2021-06-02 PROCEDURE — 3288F FALL RISK ASSESSMENT DOCD: CPT | Mod: CPTII,S$GLB,, | Performed by: ORTHOPAEDIC SURGERY

## 2021-06-02 PROCEDURE — 73552 X-RAY EXAM OF FEMUR 2/>: CPT | Mod: 26,LT,, | Performed by: RADIOLOGY

## 2021-06-02 PROCEDURE — 99024 PR POST-OP FOLLOW-UP VISIT: ICD-10-PCS | Mod: S$GLB,,, | Performed by: ORTHOPAEDIC SURGERY

## 2021-06-02 PROCEDURE — 99999 PR PBB SHADOW E&M-EST. PATIENT-LVL V: CPT | Mod: PBBFAC,,,

## 2021-06-02 RX ORDER — HYDROCODONE BITARTRATE AND ACETAMINOPHEN 5; 325 MG/1; MG/1
1 TABLET ORAL NIGHTLY PRN
Qty: 20 TABLET | Refills: 0 | Status: SHIPPED | OUTPATIENT
Start: 2021-06-02 | End: 2021-07-14

## 2021-06-03 ENCOUNTER — TELEPHONE (OUTPATIENT)
Dept: RHEUMATOLOGY | Facility: CLINIC | Age: 76
End: 2021-06-03

## 2021-06-16 ENCOUNTER — HOSPITAL ENCOUNTER (OUTPATIENT)
Dept: RADIOLOGY | Facility: HOSPITAL | Age: 76
Discharge: HOME | End: 2021-06-16
Attending: PHYSICIAN ASSISTANT
Payer: MEDICARE

## 2021-06-16 ENCOUNTER — OFFICE VISIT (OUTPATIENT)
Dept: ORTHOPEDICS | Facility: CLINIC | Age: 76
End: 2021-06-16
Payer: MEDICARE

## 2021-06-16 VITALS
DIASTOLIC BLOOD PRESSURE: 80 MMHG | BODY MASS INDEX: 31.83 KG/M2 | WEIGHT: 173 LBS | SYSTOLIC BLOOD PRESSURE: 176 MMHG | HEART RATE: 68 BPM | HEIGHT: 62 IN

## 2021-06-16 DIAGNOSIS — M89.8X5 PAIN OF LEFT FEMUR: ICD-10-CM

## 2021-06-16 DIAGNOSIS — Z78.0 OSTEOPENIA AFTER MENOPAUSE: ICD-10-CM

## 2021-06-16 DIAGNOSIS — M85.80 OSTEOPENIA AFTER MENOPAUSE: ICD-10-CM

## 2021-06-16 DIAGNOSIS — M89.8X5 PAIN OF LEFT FEMUR: Primary | ICD-10-CM

## 2021-06-16 DIAGNOSIS — S72.142D CLOSED DISPLACED INTERTROCHANTERIC FRACTURE OF LEFT FEMUR WITH ROUTINE HEALING: Primary | ICD-10-CM

## 2021-06-16 PROCEDURE — 73552 X-RAY EXAM OF FEMUR 2/>: CPT | Mod: 26,LT,, | Performed by: RADIOLOGY

## 2021-06-16 PROCEDURE — 3288F PR FALLS RISK ASSESSMENT DOCUMENTED: ICD-10-PCS | Mod: CPTII,S$GLB,, | Performed by: ORTHOPAEDIC SURGERY

## 2021-06-16 PROCEDURE — 73552 X-RAY EXAM OF FEMUR 2/>: CPT | Mod: TC,LT

## 2021-06-16 PROCEDURE — 1101F PR PT FALLS ASSESS DOC 0-1 FALLS W/OUT INJ PAST YR: ICD-10-PCS | Mod: CPTII,S$GLB,, | Performed by: ORTHOPAEDIC SURGERY

## 2021-06-16 PROCEDURE — 1125F AMNT PAIN NOTED PAIN PRSNT: CPT | Mod: S$GLB,,, | Performed by: ORTHOPAEDIC SURGERY

## 2021-06-16 PROCEDURE — 1125F PR PAIN SEVERITY QUANTIFIED, PAIN PRESENT: ICD-10-PCS | Mod: S$GLB,,, | Performed by: ORTHOPAEDIC SURGERY

## 2021-06-16 PROCEDURE — 99024 POSTOP FOLLOW-UP VISIT: CPT | Mod: S$GLB,,, | Performed by: ORTHOPAEDIC SURGERY

## 2021-06-16 PROCEDURE — 1101F PT FALLS ASSESS-DOCD LE1/YR: CPT | Mod: CPTII,S$GLB,, | Performed by: ORTHOPAEDIC SURGERY

## 2021-06-16 PROCEDURE — 3288F FALL RISK ASSESSMENT DOCD: CPT | Mod: CPTII,S$GLB,, | Performed by: ORTHOPAEDIC SURGERY

## 2021-06-16 PROCEDURE — 99024 PR POST-OP FOLLOW-UP VISIT: ICD-10-PCS | Mod: S$GLB,,, | Performed by: ORTHOPAEDIC SURGERY

## 2021-06-16 PROCEDURE — 99999 PR PBB SHADOW E&M-EST. PATIENT-LVL III: ICD-10-PCS | Mod: PBBFAC,,, | Performed by: ORTHOPAEDIC SURGERY

## 2021-06-16 PROCEDURE — 73552 XR FEMUR 2 VIEW LEFT: ICD-10-PCS | Mod: 26,LT,, | Performed by: RADIOLOGY

## 2021-06-16 PROCEDURE — 99999 PR PBB SHADOW E&M-EST. PATIENT-LVL III: CPT | Mod: PBBFAC,,, | Performed by: ORTHOPAEDIC SURGERY

## 2021-07-14 ENCOUNTER — OFFICE VISIT (OUTPATIENT)
Dept: ORTHOPEDICS | Facility: CLINIC | Age: 76
End: 2021-07-14
Payer: MEDICARE

## 2021-07-14 ENCOUNTER — HOSPITAL ENCOUNTER (OUTPATIENT)
Dept: RADIOLOGY | Facility: HOSPITAL | Age: 76
Discharge: HOME | End: 2021-07-14
Attending: ORTHOPAEDIC SURGERY
Payer: MEDICARE

## 2021-07-14 VITALS
BODY MASS INDEX: 31.83 KG/M2 | HEIGHT: 62 IN | WEIGHT: 173 LBS | DIASTOLIC BLOOD PRESSURE: 87 MMHG | HEART RATE: 76 BPM | SYSTOLIC BLOOD PRESSURE: 158 MMHG

## 2021-07-14 DIAGNOSIS — S72.142D CLOSED DISPLACED INTERTROCHANTERIC FRACTURE OF LEFT FEMUR WITH ROUTINE HEALING: ICD-10-CM

## 2021-07-14 DIAGNOSIS — M89.8X5 PAIN OF LEFT FEMUR: ICD-10-CM

## 2021-07-14 PROCEDURE — 1126F AMNT PAIN NOTED NONE PRSNT: CPT | Mod: S$GLB,,, | Performed by: ORTHOPAEDIC SURGERY

## 2021-07-14 PROCEDURE — 73552 XR FEMUR 2 VIEW LEFT: ICD-10-PCS | Mod: 26,LT,, | Performed by: RADIOLOGY

## 2021-07-14 PROCEDURE — 73552 X-RAY EXAM OF FEMUR 2/>: CPT | Mod: 26,LT,, | Performed by: RADIOLOGY

## 2021-07-14 PROCEDURE — 99024 PR POST-OP FOLLOW-UP VISIT: ICD-10-PCS | Mod: S$GLB,,, | Performed by: ORTHOPAEDIC SURGERY

## 2021-07-14 PROCEDURE — 1126F PR PAIN SEVERITY QUANTIFIED, NO PAIN PRESENT: ICD-10-PCS | Mod: S$GLB,,, | Performed by: ORTHOPAEDIC SURGERY

## 2021-07-14 PROCEDURE — 1101F PR PT FALLS ASSESS DOC 0-1 FALLS W/OUT INJ PAST YR: ICD-10-PCS | Mod: CPTII,S$GLB,, | Performed by: ORTHOPAEDIC SURGERY

## 2021-07-14 PROCEDURE — 3288F PR FALLS RISK ASSESSMENT DOCUMENTED: ICD-10-PCS | Mod: CPTII,S$GLB,, | Performed by: ORTHOPAEDIC SURGERY

## 2021-07-14 PROCEDURE — 1101F PT FALLS ASSESS-DOCD LE1/YR: CPT | Mod: CPTII,S$GLB,, | Performed by: ORTHOPAEDIC SURGERY

## 2021-07-14 PROCEDURE — 99024 POSTOP FOLLOW-UP VISIT: CPT | Mod: S$GLB,,, | Performed by: ORTHOPAEDIC SURGERY

## 2021-07-14 PROCEDURE — 3288F FALL RISK ASSESSMENT DOCD: CPT | Mod: CPTII,S$GLB,, | Performed by: ORTHOPAEDIC SURGERY

## 2021-07-14 PROCEDURE — 73552 X-RAY EXAM OF FEMUR 2/>: CPT | Mod: TC,LT

## 2021-07-14 PROCEDURE — 99999 PR PBB SHADOW E&M-EST. PATIENT-LVL V: CPT | Mod: PBBFAC,,, | Performed by: ORTHOPAEDIC SURGERY

## 2021-07-14 PROCEDURE — 99999 PR PBB SHADOW E&M-EST. PATIENT-LVL V: ICD-10-PCS | Mod: PBBFAC,,, | Performed by: ORTHOPAEDIC SURGERY

## 2021-07-14 RX ORDER — METFORMIN HYDROCHLORIDE 500 MG/1
500 TABLET ORAL DAILY
COMMUNITY
Start: 2021-02-27

## 2021-07-14 RX ORDER — INSULIN GLARGINE 300 U/ML
INJECTION, SOLUTION SUBCUTANEOUS
COMMUNITY

## 2021-07-14 RX ORDER — AMLODIPINE BESYLATE 5 MG/1
5 TABLET ORAL DAILY
COMMUNITY
Start: 2021-05-18

## 2021-07-14 RX ORDER — BUSPIRONE HYDROCHLORIDE 5 MG/1
5 TABLET ORAL 2 TIMES DAILY
COMMUNITY
Start: 2021-07-12

## 2021-07-14 RX ORDER — HYDROCODONE BITARTRATE AND ACETAMINOPHEN 5; 325 MG/1; MG/1
1 TABLET ORAL NIGHTLY PRN
Qty: 30 TABLET | Refills: 0 | Status: SHIPPED | OUTPATIENT
Start: 2021-07-14

## 2021-07-14 RX ORDER — ALENDRONATE SODIUM 70 MG/1
TABLET ORAL
COMMUNITY
Start: 2021-07-12

## 2021-07-14 RX ORDER — SUCRALFATE 1 G/1
TABLET ORAL
COMMUNITY

## 2021-07-14 RX ORDER — FLUOXETINE HYDROCHLORIDE 40 MG/1
40 CAPSULE ORAL DAILY
COMMUNITY
Start: 2021-06-28

## 2021-07-14 RX ORDER — ASPIRIN 325 MG/1
TABLET, FILM COATED ORAL
COMMUNITY

## 2021-07-14 RX ORDER — DENOSUMAB 60 MG/ML
INJECTION SUBCUTANEOUS
COMMUNITY
Start: 2021-05-18

## 2021-07-14 RX ORDER — IBANDRONATE SODIUM 150 MG/1
150 TABLET, FILM COATED ORAL
COMMUNITY
Start: 2021-04-26

## 2021-07-14 RX ORDER — PANTOPRAZOLE SODIUM 20 MG/1
20 TABLET, DELAYED RELEASE ORAL DAILY
COMMUNITY
Start: 2021-05-24

## 2024-05-09 ENCOUNTER — LAB VISIT (OUTPATIENT)
Dept: LAB | Facility: HOSPITAL | Age: 79
End: 2024-05-09
Attending: PSYCHIATRY & NEUROLOGY
Payer: MEDICARE

## 2024-05-09 DIAGNOSIS — R41.3 MEMORY LOSS: Primary | ICD-10-CM

## 2024-05-09 DIAGNOSIS — R41.3 MEMORY LOSS: ICD-10-CM

## 2024-05-09 LAB
CRP SERPL-MCNC: 0.6 MG/L (ref 0–8.2)
ERYTHROCYTE [SEDIMENTATION RATE] IN BLOOD BY PHOTOMETRIC METHOD: 7 MM/HR (ref 0–36)
FOLATE SERPL-MCNC: 9.7 NG/ML (ref 4–24)
VIT B12 SERPL-MCNC: 293 PG/ML (ref 210–950)

## 2024-05-09 PROCEDURE — 82607 VITAMIN B-12: CPT | Performed by: PSYCHIATRY & NEUROLOGY

## 2024-05-09 PROCEDURE — 86340 INTRINSIC FACTOR ANTIBODY: CPT | Performed by: PSYCHIATRY & NEUROLOGY

## 2024-05-09 PROCEDURE — 36415 COLL VENOUS BLD VENIPUNCTURE: CPT | Performed by: PSYCHIATRY & NEUROLOGY

## 2024-05-09 PROCEDURE — 86140 C-REACTIVE PROTEIN: CPT | Performed by: PSYCHIATRY & NEUROLOGY

## 2024-05-09 PROCEDURE — 85652 RBC SED RATE AUTOMATED: CPT | Performed by: PSYCHIATRY & NEUROLOGY

## 2024-05-09 PROCEDURE — 86160 COMPLEMENT ANTIGEN: CPT | Mod: 59 | Performed by: PSYCHIATRY & NEUROLOGY

## 2024-05-09 PROCEDURE — 83921 ORGANIC ACID SINGLE QUANT: CPT | Performed by: PSYCHIATRY & NEUROLOGY

## 2024-05-09 PROCEDURE — 86160 COMPLEMENT ANTIGEN: CPT | Performed by: PSYCHIATRY & NEUROLOGY

## 2024-05-09 PROCEDURE — 86038 ANTINUCLEAR ANTIBODIES: CPT | Performed by: PSYCHIATRY & NEUROLOGY

## 2024-05-09 PROCEDURE — 82746 ASSAY OF FOLIC ACID SERUM: CPT | Performed by: PSYCHIATRY & NEUROLOGY

## 2024-05-10 LAB
ANA SER QL IF: NORMAL
C3 SERPL-MCNC: 108 MG/DL (ref 50–180)
C4 SERPL-MCNC: 24 MG/DL (ref 11–44)

## 2024-05-11 LAB
ANNOTATION COMMENT IMP: NORMAL
IF BLOCK AB SER QL: NEGATIVE

## 2024-05-14 LAB — METHYLMALONATE SERPL-SCNC: 0.14 UMOL/L

## 2024-06-05 ENCOUNTER — LAB VISIT (OUTPATIENT)
Dept: LAB | Facility: HOSPITAL | Age: 79
End: 2024-06-05
Attending: PSYCHIATRY & NEUROLOGY
Payer: MEDICARE

## 2024-06-05 DIAGNOSIS — R41.3 MEMORY LOSS: ICD-10-CM

## 2024-06-05 PROCEDURE — 36415 COLL VENOUS BLD VENIPUNCTURE: CPT | Performed by: PSYCHIATRY & NEUROLOGY

## 2024-06-05 PROCEDURE — 81401 MOPATH PROCEDURE LEVEL 2: CPT | Performed by: PSYCHIATRY & NEUROLOGY

## 2024-06-12 LAB
APOLIPOPROTEIN E REASON FOR REFERRAL: NORMAL
APOLIPOPROTEIN E RELEASED BY: NORMAL
APOLIPOPROTEIN E RESULT SUMMARY: NORMAL
APOLIPOPROTEIN E RESULT: NORMAL
APOLIPOPROTEIN E SPECIMEN: NORMAL
GENETICIST REVIEW: NORMAL
SPECIMEN SOURCE: NORMAL

## 2025-01-10 ENCOUNTER — HOSPITAL ENCOUNTER (INPATIENT)
Facility: HOSPITAL | Age: 80
LOS: 5 days | Discharge: NURSING, REHAB OR LONG-TERM CARE | DRG: 481 | End: 2025-01-15
Attending: EMERGENCY MEDICINE | Admitting: FAMILY MEDICINE
Payer: MEDICARE

## 2025-01-10 DIAGNOSIS — S72.141A CLOSED COMMINUTED INTERTROCHANTERIC FRACTURE OF RIGHT FEMUR, INITIAL ENCOUNTER: ICD-10-CM

## 2025-01-10 DIAGNOSIS — Z01.810 PREOP CARDIOVASCULAR EXAM: ICD-10-CM
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
## 2025-01-10 DIAGNOSIS — S72.141A CLOSED INTERTROCHANTERIC FRACTURE OF RIGHT FEMUR, INITIAL ENCOUNTER: Primary | ICD-10-CM

## 2025-01-10 DIAGNOSIS — Z86.39 HISTORY OF DIABETES MELLITUS, TYPE II: ICD-10-CM

## 2025-01-10 DIAGNOSIS — S72.141A INTERTROCHANTERIC FRACTURE OF RIGHT HIP, CLOSED, INITIAL ENCOUNTER: ICD-10-CM

## 2025-01-10 DIAGNOSIS — R07.9 CHEST PAIN: ICD-10-CM

## 2025-01-10 DIAGNOSIS — Z86.59 HISTORY OF DEMENTIA: ICD-10-CM

## 2025-01-10 PROBLEM — I10 PRIMARY HYPERTENSION: Status: ACTIVE | Noted: 2025-01-10

## 2025-01-10 PROBLEM — S72.144A CLOSED NONDISPLACED INTERTROCHANTERIC FRACTURE OF RIGHT FEMUR: Status: ACTIVE | Noted: 2025-01-10

## 2025-01-10 LAB
ALBUMIN SERPL BCP-MCNC: 3.5 G/DL (ref 3.5–5.2)
ALP SERPL-CCNC: 77 U/L (ref 40–150)
ALT SERPL W/O P-5'-P-CCNC: 21 U/L (ref 10–44)
ANION GAP SERPL CALC-SCNC: 13 MMOL/L (ref 8–16)
AST SERPL-CCNC: 23 U/L (ref 10–40)
BASOPHILS # BLD AUTO: 0.03 K/UL (ref 0–0.2)
BASOPHILS NFR BLD: 0.3 % (ref 0–1.9)
BILIRUB SERPL-MCNC: 1 MG/DL (ref 0.1–1)
BILIRUB UR QL STRIP: NEGATIVE
BILIRUB UR QL STRIP: NEGATIVE
BUN SERPL-MCNC: 12 MG/DL (ref 8–23)
CALCIUM SERPL-MCNC: 8.8 MG/DL (ref 8.7–10.5)
CHLORIDE SERPL-SCNC: 105 MMOL/L (ref 95–110)
CLARITY UR: CLEAR
CLARITY UR: CLEAR
CO2 SERPL-SCNC: 22 MMOL/L (ref 23–29)
COLOR UR: YELLOW
COLOR UR: YELLOW
CREAT SERPL-MCNC: 0.7 MG/DL (ref 0.5–1.4)
DIFFERENTIAL METHOD BLD: ABNORMAL
EOSINOPHIL # BLD AUTO: 0 K/UL (ref 0–0.5)
EOSINOPHIL NFR BLD: 0 % (ref 0–8)
ERYTHROCYTE [DISTWIDTH] IN BLOOD BY AUTOMATED COUNT: 13 % (ref 11.5–14.5)
EST. GFR  (NO RACE VARIABLE): >60 ML/MIN/1.73 M^2
GLUCOSE SERPL-MCNC: 168 MG/DL (ref 70–110)
GLUCOSE UR QL STRIP: NEGATIVE
GLUCOSE UR QL STRIP: NEGATIVE
HCT VFR BLD AUTO: 36.6 % (ref 37–48.5)
HGB BLD-MCNC: 12 G/DL (ref 12–16)
HGB UR QL STRIP: NEGATIVE
HGB UR QL STRIP: NEGATIVE
IMM GRANULOCYTES # BLD AUTO: 0.05 K/UL (ref 0–0.04)
IMM GRANULOCYTES NFR BLD AUTO: 0.5 % (ref 0–0.5)
KETONES UR QL STRIP: ABNORMAL
KETONES UR QL STRIP: ABNORMAL
LEUKOCYTE ESTERASE UR QL STRIP: NEGATIVE
LEUKOCYTE ESTERASE UR QL STRIP: NEGATIVE
LYMPHOCYTES # BLD AUTO: 1.1 K/UL (ref 1–4.8)
LYMPHOCYTES NFR BLD: 11.1 % (ref 18–48)
MCH RBC QN AUTO: 28.9 PG (ref 27–31)
MCHC RBC AUTO-ENTMCNC: 32.8 G/DL (ref 32–36)
MCV RBC AUTO: 88 FL (ref 82–98)
MONOCYTES # BLD AUTO: 0.4 K/UL (ref 0.3–1)
MONOCYTES NFR BLD: 3.9 % (ref 4–15)
NEUTROPHILS # BLD AUTO: 8.3 K/UL (ref 1.8–7.7)
NEUTROPHILS NFR BLD: 84.2 % (ref 38–73)
NITRITE UR QL STRIP: NEGATIVE
NITRITE UR QL STRIP: NEGATIVE
NRBC BLD-RTO: 0 /100 WBC
PH UR STRIP: 8 [PH] (ref 5–8)
PH UR STRIP: 8 [PH] (ref 5–8)
PLATELET # BLD AUTO: 192 K/UL (ref 150–450)
PMV BLD AUTO: 9.8 FL (ref 9.2–12.9)
POCT GLUCOSE: 174 MG/DL (ref 70–110)
POCT GLUCOSE: 182 MG/DL (ref 70–110)
POTASSIUM SERPL-SCNC: 4.5 MMOL/L (ref 3.5–5.1)
PROT SERPL-MCNC: 6.5 G/DL (ref 6–8.4)
PROT UR QL STRIP: ABNORMAL
PROT UR QL STRIP: NEGATIVE
RBC # BLD AUTO: 4.15 M/UL (ref 4–5.4)
SODIUM SERPL-SCNC: 140 MMOL/L (ref 136–145)
SP GR UR STRIP: 1.02 (ref 1–1.03)
SP GR UR STRIP: 1.02 (ref 1–1.03)
URN SPEC COLLECT METH UR: ABNORMAL
URN SPEC COLLECT METH UR: ABNORMAL
UROBILINOGEN UR STRIP-ACNC: ABNORMAL EU/DL
UROBILINOGEN UR STRIP-ACNC: ABNORMAL EU/DL
WBC # BLD AUTO: 9.89 K/UL (ref 3.9–12.7)

## 2025-01-10 PROCEDURE — 96374 THER/PROPH/DIAG INJ IV PUSH: CPT

## 2025-01-10 PROCEDURE — 63600175 PHARM REV CODE 636 W HCPCS: Performed by: NURSE PRACTITIONER

## 2025-01-10 PROCEDURE — 99285 EMERGENCY DEPT VISIT HI MDM: CPT | Mod: 57,,, | Performed by: PHYSICIAN ASSISTANT

## 2025-01-10 PROCEDURE — 25000003 PHARM REV CODE 250: Performed by: EMERGENCY MEDICINE

## 2025-01-10 PROCEDURE — 96375 TX/PRO/DX INJ NEW DRUG ADDON: CPT

## 2025-01-10 PROCEDURE — 80053 COMPREHEN METABOLIC PANEL: CPT | Performed by: NURSE PRACTITIONER

## 2025-01-10 PROCEDURE — 81003 URINALYSIS AUTO W/O SCOPE: CPT | Performed by: NURSE PRACTITIONER

## 2025-01-10 PROCEDURE — 81003 URINALYSIS AUTO W/O SCOPE: CPT | Mod: 91 | Performed by: EMERGENCY MEDICINE

## 2025-01-10 PROCEDURE — 99285 EMERGENCY DEPT VISIT HI MDM: CPT | Mod: 25

## 2025-01-10 PROCEDURE — 63600175 PHARM REV CODE 636 W HCPCS: Performed by: EMERGENCY MEDICINE

## 2025-01-10 PROCEDURE — 11000001 HC ACUTE MED/SURG PRIVATE ROOM

## 2025-01-10 PROCEDURE — 25000003 PHARM REV CODE 250: Performed by: FAMILY MEDICINE

## 2025-01-10 PROCEDURE — 85025 COMPLETE CBC W/AUTO DIFF WBC: CPT | Performed by: NURSE PRACTITIONER

## 2025-01-10 PROCEDURE — 25000003 PHARM REV CODE 250: Performed by: NURSE PRACTITIONER

## 2025-01-10 RX ORDER — ACETAMINOPHEN 325 MG/1
650 TABLET ORAL EVERY 4 HOURS PRN
Status: DISCONTINUED | OUTPATIENT
Start: 2025-01-10 | End: 2025-01-15 | Stop reason: HOSPADM

## 2025-01-10 RX ORDER — IBUPROFEN 200 MG
24 TABLET ORAL
Status: DISCONTINUED | OUTPATIENT
Start: 2025-01-10 | End: 2025-01-15 | Stop reason: HOSPADM

## 2025-01-10 RX ORDER — NALOXONE HCL 0.4 MG/ML
0.02 VIAL (ML) INJECTION
Status: DISCONTINUED | OUTPATIENT
Start: 2025-01-10 | End: 2025-01-15 | Stop reason: HOSPADM

## 2025-01-10 RX ORDER — MORPHINE SULFATE 4 MG/ML
4 INJECTION, SOLUTION INTRAMUSCULAR; INTRAVENOUS EVERY 4 HOURS PRN
Status: DISCONTINUED | OUTPATIENT
Start: 2025-01-10 | End: 2025-01-10

## 2025-01-10 RX ORDER — ATORVASTATIN CALCIUM 10 MG/1
20 TABLET, FILM COATED ORAL NIGHTLY
Status: DISCONTINUED | OUTPATIENT
Start: 2025-01-10 | End: 2025-01-15 | Stop reason: HOSPADM

## 2025-01-10 RX ORDER — MORPHINE SULFATE 4 MG/ML
6 INJECTION, SOLUTION INTRAMUSCULAR; INTRAVENOUS
Status: COMPLETED | OUTPATIENT
Start: 2025-01-10 | End: 2025-01-10

## 2025-01-10 RX ORDER — AMLODIPINE BESYLATE 10 MG/1
10 TABLET ORAL DAILY
Status: DISCONTINUED | OUTPATIENT
Start: 2025-01-10 | End: 2025-01-15 | Stop reason: HOSPADM

## 2025-01-10 RX ORDER — LEVOTHYROXINE SODIUM 100 UG/1
100 TABLET ORAL
Status: DISCONTINUED | OUTPATIENT
Start: 2025-01-11 | End: 2025-01-15 | Stop reason: HOSPADM

## 2025-01-10 RX ORDER — IRON,CARBONYL/ASCORBIC ACID 100-250 MG
1 TABLET ORAL DAILY
COMMUNITY
Start: 2024-03-01

## 2025-01-10 RX ORDER — OXYCODONE HYDROCHLORIDE 5 MG/1
5 TABLET ORAL EVERY 6 HOURS PRN
Status: DISCONTINUED | OUTPATIENT
Start: 2025-01-10 | End: 2025-01-15 | Stop reason: HOSPADM

## 2025-01-10 RX ORDER — DONEPEZIL HYDROCHLORIDE 10 MG/1
10 TABLET, FILM COATED ORAL NIGHTLY
COMMUNITY

## 2025-01-10 RX ORDER — IBUPROFEN 200 MG
16 TABLET ORAL
Status: DISCONTINUED | OUTPATIENT
Start: 2025-01-10 | End: 2025-01-15 | Stop reason: HOSPADM

## 2025-01-10 RX ORDER — HYDROCODONE BITARTRATE AND ACETAMINOPHEN 5; 325 MG/1; MG/1
1 TABLET ORAL EVERY 6 HOURS PRN
Status: DISCONTINUED | OUTPATIENT
Start: 2025-01-10 | End: 2025-01-10

## 2025-01-10 RX ORDER — TRAMADOL HYDROCHLORIDE 50 MG/1
50 TABLET ORAL EVERY 6 HOURS PRN
Status: DISCONTINUED | OUTPATIENT
Start: 2025-01-10 | End: 2025-01-15 | Stop reason: HOSPADM

## 2025-01-10 RX ORDER — DONEPEZIL HYDROCHLORIDE 5 MG/1
10 TABLET, FILM COATED ORAL NIGHTLY
Status: DISCONTINUED | OUTPATIENT
Start: 2025-01-10 | End: 2025-01-15 | Stop reason: HOSPADM

## 2025-01-10 RX ORDER — MORPHINE SULFATE 4 MG/ML
2 INJECTION, SOLUTION INTRAMUSCULAR; INTRAVENOUS EVERY 4 HOURS PRN
Status: DISCONTINUED | OUTPATIENT
Start: 2025-01-10 | End: 2025-01-10

## 2025-01-10 RX ORDER — HYDRALAZINE HYDROCHLORIDE 20 MG/ML
10 INJECTION INTRAMUSCULAR; INTRAVENOUS EVERY 6 HOURS PRN
Status: DISCONTINUED | OUTPATIENT
Start: 2025-01-10 | End: 2025-01-15 | Stop reason: HOSPADM

## 2025-01-10 RX ORDER — ONDANSETRON HYDROCHLORIDE 2 MG/ML
4 INJECTION, SOLUTION INTRAVENOUS
Status: COMPLETED | OUTPATIENT
Start: 2025-01-10 | End: 2025-01-10

## 2025-01-10 RX ORDER — MORPHINE SULFATE 4 MG/ML
2 INJECTION, SOLUTION INTRAMUSCULAR; INTRAVENOUS EVERY 4 HOURS PRN
Status: DISCONTINUED | OUTPATIENT
Start: 2025-01-10 | End: 2025-01-15 | Stop reason: HOSPADM

## 2025-01-10 RX ORDER — GLUCAGON 1 MG
1 KIT INJECTION
Status: DISCONTINUED | OUTPATIENT
Start: 2025-01-10 | End: 2025-01-15 | Stop reason: HOSPADM

## 2025-01-10 RX ORDER — CARVEDILOL 12.5 MG/1
25 TABLET ORAL 2 TIMES DAILY
Status: DISCONTINUED | OUTPATIENT
Start: 2025-01-10 | End: 2025-01-15 | Stop reason: HOSPADM

## 2025-01-10 RX ORDER — HYDROCODONE BITARTRATE AND ACETAMINOPHEN 5; 325 MG/1; MG/1
1 TABLET ORAL EVERY 6 HOURS PRN
Status: DISCONTINUED | OUTPATIENT
Start: 2025-01-10 | End: 2025-01-15 | Stop reason: HOSPADM

## 2025-01-10 RX ORDER — AMLODIPINE BESYLATE 10 MG/1
10 TABLET ORAL
COMMUNITY

## 2025-01-10 RX ORDER — LORAZEPAM 2 MG/ML
2 INJECTION INTRAMUSCULAR
Status: DISCONTINUED | OUTPATIENT
Start: 2025-01-10 | End: 2025-01-15 | Stop reason: HOSPADM

## 2025-01-10 RX ORDER — PANTOPRAZOLE SODIUM 40 MG/1
40 TABLET, DELAYED RELEASE ORAL DAILY
Status: DISCONTINUED | OUTPATIENT
Start: 2025-01-11 | End: 2025-01-15 | Stop reason: HOSPADM

## 2025-01-10 RX ORDER — FLUOXETINE HYDROCHLORIDE 20 MG/1
40 CAPSULE ORAL DAILY
Status: DISCONTINUED | OUTPATIENT
Start: 2025-01-11 | End: 2025-01-15 | Stop reason: HOSPADM

## 2025-01-10 RX ORDER — MECLIZINE HYDROCHLORIDE 25 MG/1
25 TABLET ORAL 3 TIMES DAILY PRN
COMMUNITY
Start: 2024-07-23

## 2025-01-10 RX ORDER — CYCLOBENZAPRINE HCL 5 MG
5 TABLET ORAL DAILY PRN
Status: ON HOLD | COMMUNITY
End: 2025-01-14 | Stop reason: HOSPADM

## 2025-01-10 RX ORDER — THIAMINE HCL 100 MG
100 TABLET ORAL DAILY
Status: DISCONTINUED | OUTPATIENT
Start: 2025-01-11 | End: 2025-01-15 | Stop reason: HOSPADM

## 2025-01-10 RX ORDER — POLYETHYLENE GLYCOL 3350 17 G/17G
17 POWDER, FOR SOLUTION ORAL DAILY
Status: DISCONTINUED | OUTPATIENT
Start: 2025-01-11 | End: 2025-01-14

## 2025-01-10 RX ORDER — ONDANSETRON HYDROCHLORIDE 2 MG/ML
4 INJECTION, SOLUTION INTRAVENOUS EVERY 8 HOURS PRN
Status: DISCONTINUED | OUTPATIENT
Start: 2025-01-10 | End: 2025-01-15 | Stop reason: HOSPADM

## 2025-01-10 RX ORDER — SODIUM CHLORIDE 0.9 % (FLUSH) 0.9 %
10 SYRINGE (ML) INJECTION EVERY 12 HOURS PRN
Status: DISCONTINUED | OUTPATIENT
Start: 2025-01-10 | End: 2025-01-15 | Stop reason: HOSPADM

## 2025-01-10 RX ORDER — TIRZEPATIDE 2.5 MG/.5ML
INJECTION, SOLUTION SUBCUTANEOUS
COMMUNITY
End: 2025-01-10

## 2025-01-10 RX ORDER — INSULIN ASPART 100 [IU]/ML
0-10 INJECTION, SOLUTION INTRAVENOUS; SUBCUTANEOUS
Status: DISCONTINUED | OUTPATIENT
Start: 2025-01-10 | End: 2025-01-15 | Stop reason: HOSPADM

## 2025-01-10 RX ORDER — SODIUM CHLORIDE 9 MG/ML
1000 INJECTION, SOLUTION INTRAVENOUS
Status: COMPLETED | OUTPATIENT
Start: 2025-01-10 | End: 2025-01-10

## 2025-01-10 RX ADMIN — ONDANSETRON 4 MG: 2 INJECTION INTRAMUSCULAR; INTRAVENOUS at 03:01

## 2025-01-10 RX ADMIN — SODIUM CHLORIDE 1000 ML: 9 INJECTION, SOLUTION INTRAVENOUS at 05:01

## 2025-01-10 RX ADMIN — DONEPEZIL HYDROCHLORIDE 10 MG: 5 TABLET, FILM COATED ORAL at 08:01

## 2025-01-10 RX ADMIN — ATORVASTATIN CALCIUM 20 MG: 10 TABLET, FILM COATED ORAL at 08:01

## 2025-01-10 RX ADMIN — INSULIN ASPART 2 UNITS: 100 INJECTION, SOLUTION INTRAVENOUS; SUBCUTANEOUS at 06:01

## 2025-01-10 RX ADMIN — MORPHINE SULFATE 6 MG: 4 INJECTION INTRAVENOUS at 03:01

## 2025-01-10 RX ADMIN — HYDROCODONE BITARTRATE AND ACETAMINOPHEN 1 TABLET: 5; 325 TABLET ORAL at 08:01

## 2025-01-10 RX ADMIN — MORPHINE SULFATE 4 MG: 4 INJECTION INTRAVENOUS at 05:01

## 2025-01-10 RX ADMIN — CARVEDILOL 25 MG: 12.5 TABLET, FILM COATED ORAL at 08:01

## 2025-01-10 NOTE — H&P
[facility] - Emergency Dept.  [facility] Medicine  History & Physical    Patient Name: Emely Bush  MRN: [id number]  Patient Class: IP- Inpatient  Admission Date: 1/10/2025  Attending Physician: Elizabeth Small MD   Primary Care Provider: Dara Talley MD         Patient information was obtained from patient, spouse/SO, past medical records, and ER records.     Subjective:     Principal Problem:Closed nondisplaced intertrochanteric fracture of right femur    Chief Complaint:   Chief Complaint   Patient presents with    Fall     Pt c/o right thigh pain after she got up to use the restroom this morning and fell. Swelling noted. Pt doesn't recall if she hit her head or lost consciousness. Taking ASA. Hx of dementia.         HPI: 79 y.o. female, comorbid conditions include dementia, DM, HTN, and CAD.  Presented to the ED for evaluation of R hip pain and swelling which onset after falling this morning when she got up to use the restroom. Denies hitting her head or loss of consciousness. Associated sxs include R leg pain. Patient denies any fever, HA, n/v, and all other sxs at this time.  states that she normally ambulates well without assistance, but has recently been dx with early dementia and has had some trouble getting around. Takes ASA 81mg daily. In the ED, vitals: 137/56, 67, 18, 98.1F, 99% RA on arrival. Significant labs: Glu: 168. Xray Femur: Comminuted, displaced and angulated right intertrochanteric fracture. Orthopedic consulted in ED. Treated with IV fluids, pain medication, anti-emetic. Patient is a full code. Admitted to Hospital Medicine for management of Right Hip Fracture.     Past Medical History:   Diagnosis Date    Coronary artery disease     Diabetes mellitus     Hypertension        Past Surgical History:   Procedure Laterality Date    APPENDECTOMY      CARDIAC SURGERY      CHOLECYSTECTOMY      CORONARY ARTERY BYPASS GRAFT      HYSTERECTOMY      INTRAMEDULLARY RODDING OF FEMUR Left  4/25/2021    Procedure: INSERTION, INTREMEDULLARY FÁTIMA, FEMUR;  Surgeon: An Mart MD;  Location: [facility];  Service: Orthopedics;  Laterality: Left;  Left hip cephalomedullarly nail, or spinal/sifi block    THYROID SURGERY  1995       Review of patient's allergies indicates:  No Known Allergies    No current facility-administered medications on file prior to encounter.     Current Outpatient Medications on File Prior to Encounter   Medication Sig    meclizine (ANTIVERT) 25 mg tablet Take 25 mg by mouth 3 (three) times daily as needed.    alendronate (FOSAMAX) 70 MG tablet TAKE 1 TABLET BY MOUTH 30 MINUTES BEFORE THE FIRST FOOD, BEVERAGE OR MEDICINE OF THE DAY WITH PLAIN WATER ON SUNDAY    amLODIPine (NORVASC) 10 MG tablet Take 10 mg by mouth.    amLODIPine (NORVASC) 5 MG tablet Take 5 mg by mouth once daily.    aspirin 81 MG Chew Take 1 tablet (81 mg total) by mouth once daily.    atorvastatin (LIPITOR) 20 MG tablet Take 1 tablet (20 mg total) by mouth every evening.    busPIRone (BUSPAR) 5 MG Tab Take 5 mg by mouth 2 (two) times daily.    carvediloL (COREG) 25 MG tablet Take 1 tablet (25 mg total) by mouth 2 (two) times daily.    chlordiazepoxide (LIBRIUM) 10 MG capsule Take 1 capsule (10 mg total) by mouth every evening. for 5 days    denosumab (PROLIA) 60 mg/mL Syrg as directed    donepeziL (ARICEPT) 10 MG tablet Take 10 mg by mouth every evening.    FLUoxetine 40 MG capsule Take 40 mg by mouth once daily.    folic acid (FOLVITE) 1 MG tablet Take 1 tablet (1 mg total) by mouth once daily.    HYDROcodone-acetaminophen (NORCO) 5-325 mg per tablet Take 1 tablet by mouth nightly as needed for Pain.    ibandronate (BONIVA) 150 mg tablet Take 150 mg by mouth every 30 days.    insulin aspart U-100 (NOVOLOG) 100 unit/mL (3 mL) InPn pen Inject 0-5 Units into the skin before meals and at bedtime as needed (Hyperglycemia). **LOW CORRECTION DOSE**  Blood Glucose  mg/dL                  Pre-meal                 "2200  151-200                0 unit                      0 unit  201-250                2 units                    1 unit  251-300                3 units                    1 unit  301-350                4 units                    2 units  >350                     5 units                    3 units  Administer subcutaneously if needed at times designated by monitoring schedule.   DO NOT HOLD correction dose insulin for patients who are  NPO. "HIGH ALERT MEDICATION" - Administer with meals or TF/TPN.    insulin detemir U-100 (LEVEMIR FLEXTOUCH) 100 unit/mL (3 mL) SubQ InPn pen Inject 10 Units into the skin every evening.    insulin glargine, TOUJEO, (TOUJEO SOLOSTAR U-300 INSULIN) 300 unit/mL (1.5 mL) InPn pen 26 units    levothyroxine (SYNTHROID) 100 MCG tablet Take 100 mcg by mouth before breakfast.    LIDOcaine (LIDODERM) 5 % Place 1 patch onto the skin once daily. Remove & Discard patch within 12 hours or as directed by MD    losartan (COZAAR) 100 MG tablet Take 1 tablet (100 mg total) by mouth once daily.    metFORMIN (GLUCOPHAGE) 500 MG tablet Take 500 mg by mouth once daily.    mupirocin (BACTROBAN) 2 % ointment by Nasal route 2 (two) times daily.    pantoprazole (PROTONIX) 20 MG tablet Take 20 mg by mouth once daily.    sucralfate (CARAFATE) 1 gram tablet 1 tablet on an empty stomach    thiamine 100 MG tablet Take 1 tablet (100 mg total) by mouth once daily.    thiamine mononitrate, vit B1, (VITAMIN B-1, MONONITRATE,) 100 mg Tab 1 tablet    tirzepatide (MOUNJARO) 2.5 mg/0.5 mL PnIj INJECT 2 PENS UNDER THE SKIN EVERY 7 DAYS.    traZODone (DESYREL) 100 MG tablet Take 100 mg by mouth every evening.     Family History       Problem Relation (Age of Onset)    Chronic back pain Mother    Heart disease Father    Hypertension Father          Tobacco Use    Smoking status: Never    Smokeless tobacco: Never   Substance and Sexual Activity    Alcohol use: Yes     Alcohol/week: 2.0 standard drinks of alcohol     Types: 2 " Glasses of wine per week     Comment: nightly, 2 glasses of wine    Drug use: Never    Sexual activity: Not on file     Review of Systems   Constitutional:  Positive for activity change, appetite change and fatigue.   Respiratory:  Negative for shortness of breath.    Cardiovascular:  Positive for leg swelling (Right upper thigh). Negative for chest pain.   Gastrointestinal:  Positive for nausea and vomiting. Negative for abdominal distention and constipation.   Musculoskeletal:  Positive for arthralgias, gait problem, joint swelling and myalgias.   Skin:  Positive for color change.   Neurological:  Positive for weakness.   Psychiatric/Behavioral:  Positive for agitation.    All other systems reviewed and are negative.    Objective:     Vital Signs (Most Recent):  Temp: 98.2 °F (36.8 °C) (01/10/25 1642)  Pulse: 70 (01/10/25 1642)  Resp: 18 (01/10/25 1711)  BP: (!) 180/77 (01/10/25 1642)  SpO2: 95 % (01/10/25 1642) Vital Signs (24h Range):  Temp:  [98.1 °F (36.7 °C)-98.2 °F (36.8 °C)] 98.2 °F (36.8 °C)  Pulse:  [67-70] 70  Resp:  [18-20] 18  SpO2:  [95 %-99 %] 95 %  BP: (137-180)/(56-77) 180/77     Weight: 61.2 kg (135 lb)  Body mass index is 24.69 kg/m².     Physical Exam  Vitals and nursing note reviewed.   Constitutional:       General: She is not in acute distress.     Appearance: She is normal weight. She is ill-appearing.   HENT:      Head: Normocephalic and atraumatic.      Right Ear: Hearing and external ear normal.      Left Ear: Hearing and external ear normal.      Nose: No rhinorrhea.      Right Sinus: No maxillary sinus tenderness or frontal sinus tenderness.      Left Sinus: No maxillary sinus tenderness or frontal sinus tenderness.      Mouth/Throat:      Mouth: No oral lesions.      Pharynx: Uvula midline.   Eyes:      General:         Right eye: No discharge.         Left eye: No discharge.      Conjunctiva/sclera: Conjunctivae normal.      Pupils: Pupils are equal, round, and reactive to light.    Neck:      Thyroid: No thyromegaly.      Vascular: No carotid bruit.      Trachea: No tracheal deviation.   Cardiovascular:      Rate and Rhythm: Normal rate and regular rhythm.      Pulses:           Dorsalis pedis pulses are 2+ on the right side and 2+ on the left side.      Heart sounds: S1 normal and S2 normal. Murmur heard.   Pulmonary:      Effort: Pulmonary effort is normal. No respiratory distress.      Breath sounds: Normal breath sounds.   Abdominal:      General: Bowel sounds are decreased. There is distension.      Palpations: Abdomen is soft. There is no mass.      Tenderness: There is no abdominal tenderness.   Musculoskeletal:         General: Normal range of motion.      Cervical back: Normal range of motion.      Right upper leg: Swelling and deformity present.        Legs:       Comments: NVI intact on right   Lymphadenopathy:      Cervical: No cervical adenopathy.      Upper Body:      Right upper body: No supraclavicular adenopathy.      Left upper body: No supraclavicular adenopathy.   Skin:     General: Skin is warm and dry.      Capillary Refill: Capillary refill takes less than 2 seconds.      Findings: No rash.   Neurological:      Mental Status: Mental status is at baseline. She is lethargic.   Psychiatric:         Mood and Affect: Mood is anxious.         Behavior: Behavior is slowed.              CRANIAL NERVES     CN III, IV, VI   Pupils are equal, round, and reactive to light.       Significant Labs: All pertinent labs within the past 24 hours have been reviewed.  CBC:   Recent Labs   Lab 01/10/25  1608   WBC 9.89   HGB 12.0   HCT 36.6*        CMP:   Recent Labs   Lab 01/10/25  1608      K 4.5      CO2 22*   *   BUN 12   CREATININE 0.7   CALCIUM 8.8   PROT 6.5   ALBUMIN 3.5   BILITOT 1.0   ALKPHOS 77   AST 23   ALT 21   ANIONGAP 13       Significant Imaging: I have reviewed all pertinent imaging results/findings within the past 24 hours.  Assessment/Plan:  "    * Closed nondisplaced intertrochanteric fracture of right femur   Xray: Comminuted, displaced and angulated right intertrochanteric fracture. Mid right thigh swelling. Pedal pulse: 2+ on initial evaluation    --Monitor NVI of right leg q4h  --Serial H/H  --Type and screen  --tele  --vitals per protocol  --NPO after midnight  --Orthopedic surgery consulted, planned for surgery in AM      Primary hypertension  Patient's blood pressure range in the last 24 hours was: BP  Min: 137/56  Max: 180/77.The patient's inpatient anti-hypertensive regimen is listed below:  Current Antihypertensives  , , Oral  hydrALAZINE injection 10 mg, Every 6 hours PRN, Intravenous  amLODIPine tablet 10 mg, Daily, Oral  carvediloL tablet 25 mg, 2 times daily, Oral    Plan  - BP is controlled, no changes needed to their regimen      Osteopenia after menopause  Managed with biphosphonates      Hypothyroidism  --cont home med      Chronic systolic heart failure    Patient has Systolic (HFrEF) heart failure that is Chronic. On presentation their CHF was well compensated. Most recent BNP and echo results are listed below.  No results for input(s): "BNP" in the last 72 hours.  Latest ECHO  Results for orders placed during the hospital encounter of 04/24/21    Echo Color Flow Doppler? Yes    Interpretation Summary  · Concentric hypertrophy and normal systolic function.  · The estimated ejection fraction is 55%.  · Indeterminate left ventricular diastolic function.  · With normal right ventricular systolic function.  · Moderate left atrial enlargement.    Current Heart Failure Medications  hydrALAZINE injection 10 mg, Every 6 hours PRN, Intravenous    Plan  - Monitor strict I&Os and daily weights.    - Place on telemetry  - Low sodium diet  - Place on fluid restriction of 1.5 L.   - Cardiology has not been consulted  - The patient's volume status is at their baseline           Controlled type 2 diabetes with neuropathy  Patient's FSGs are " "controlled on current medication regimen.  Last A1c reviewed-   Lab Results   Component Value Date    HGBA1C 7.3 (H) 04/24/2021     Most recent fingerstick glucose reviewed- No results for input(s): "POCTGLUCOSE" in the last 24 hours.  Current correctional scale  Medium  Maintain anti-hyperglycemic dose as follows-   Antihyperglycemics (From admission, onward)      Start     Stop Route Frequency Ordered    01/10/25 1749  insulin aspart U-100 pen 0-10 Units         -- SubQ Before meals & nightly PRN 01/10/25 1650          Hold Oral hypoglycemics while patient is in the hospital.      VTE Risk Mitigation (From admission, onward)           Ordered     IP VTE HIGH RISK PATIENT  Once         01/10/25 1650     Place sequential compression device  Until discontinued         01/10/25 1650     Reason for No Pharmacological VTE Prophylaxis  Once        Question:  Reasons:  Answer:  Risk of Bleeding    01/10/25 1650                                    Dara Henry NP  Department of Hospital Medicine  [facility] - Emergency Dept.          "

## 2025-01-10 NOTE — HPI
79 y.o. female, comorbid conditions include dementia, DM, HTN, and CAD.  Presented to the ED for evaluation of R hip pain and swelling which onset after falling this morning when she got up to use the restroom. Denies hitting her head or loss of consciousness. Associated sxs include R leg pain. Patient denies any fever, HA, n/v, and all other sxs at this time.  states that she normally ambulates well without assistance, but has recently been dx with early dementia and has had some trouble getting around. Takes ASA 81mg daily. In the ED, vitals: 137/56, 67, 18, 98.1F, 99% RA on arrival. Significant labs: Glu: 168. Xray Femur: Comminuted, displaced and angulated right intertrochanteric fracture. Orthopedic consulted in ED. Treated with IV fluids, pain medication, anti-emetic. Patient is a full code. Admitted to Hospital Medicine for management of Right Hip Fracture.

## 2025-01-10 NOTE — ED PROVIDER NOTES
SCRIBE #1 NOTE: I, Stephanie Fernandez, am scribing for, and in the presence of, Emelyn Beverly MD. I have scribed the entire note     History     Chief Complaint   Patient presents with    Fall     Pt c/o right thigh pain after she got up to use the restroom this morning and fell. Swelling noted. Pt doesn't recall if she hit her head or lost consciousness. Taking ASA. Hx of dementia.      Review of patient's allergies indicates:  No Known Allergies      History of Present Illness     HPI    1/10/2025, 3:31 PM  History obtained from the patient and       History of Present Illness: Emely Bush is a 79 y.o. female patient with a PMHx of dementia, DM, HTN, and CAD who presents to the Emergency Department for evaluation of R hip pain and swelling which onset gradually after falling this morning when she got up to use the restroom. Pt does not think believe that she hit her head or lost consciousness. Symptoms are constant and moderate in severity. No mitigating or exacerbating factors reported. Associated sxs include R proximal leg pain. Patient denies any head trauma, no neck or back pain, no chest pain or dyspnea, no numbness, no fever, HA, n/v, and all other sxs at this time. No prior tx.  states that she normally ambulates well without assistance, but has recently been dx with beginning dementia and has had some trouble getting around. On ASA. No further complaints or concerns at this time.       Arrival mode: Personal vehicle    PCP: Dara Talley MD        Past Medical History:  Past Medical History:   Diagnosis Date    Coronary artery disease     Diabetes mellitus     Hypertension        Past Surgical History:  Past Surgical History:   Procedure Laterality Date    APPENDECTOMY      CARDIAC SURGERY      CHOLECYSTECTOMY      CORONARY ARTERY BYPASS GRAFT      HYSTERECTOMY      INTRAMEDULLARY RODDING OF FEMUR Left 4/25/2021    Procedure: INSERTION, INTREMEDULLARY FÁTIMA, FEMUR;  Surgeon: An WARREN  MD Barron;  Location: [facility] OR;  Service: Orthopedics;  Laterality: Left;  Left hip cephalomedullarly nail, or spinal/sifi block    THYROID SURGERY  1995         Family History:  Family History   Problem Relation Name Age of Onset    Chronic back pain Mother      Heart disease Father      Hypertension Father         Social History:  Social History     Tobacco Use    Smoking status: Never    Smokeless tobacco: Never   Substance and Sexual Activity    Alcohol use: Yes     Alcohol/week: 2.0 standard drinks of alcohol     Types: 2 Glasses of wine per week     Comment: nightly, 2 glasses of wine    Drug use: Never    Sexual activity: Not on file        Review of Systems     Review of Systems   Constitutional:  Negative for fever.   HENT:  Negative for sore throat.    Respiratory:  Negative for shortness of breath.    Cardiovascular:  Negative for chest pain.   Gastrointestinal:  Negative for nausea.   Genitourinary:  Negative for dysuria.   Musculoskeletal:  Positive for arthralgias (R hip with swelling) and myalgias (R leg). Negative for back pain.   Skin:  Negative for rash.   Neurological:  Negative for weakness.   Hematological:  Does not bruise/bleed easily.   All other systems reviewed and are negative.       Physical Exam     Initial Vitals [01/10/25 1322]   BP Pulse Resp Temp SpO2   (!) 137/56 67 18 98.1 °F (36.7 °C) 99 %      MAP       --          Physical Exam  Nursing Notes and Vital Signs Reviewed.  Constitutional: Patient is in mild distress. Elderly. Chronically ill-appearing.  Head: Atraumatic. Normocephalic.  Eyes: PERRL. EOM intact. Conjunctivae are not pale. No scleral icterus.  ENT: Mucous membranes are moist. Oropharynx is clear and symmetric.    Neck: Supple. Full ROM. No lymphadenopathy.  Cardiovascular: Regular rate. Regular rhythm. No murmurs, rubs, or gallops. Distal pulses are 2+ and symmetric.  Pulmonary/Chest: No respiratory distress. Clear to auscultation bilaterally. No wheezing or  "rales.  Abdominal: Soft and non-distended.  There is no tenderness.  No rebound, guarding, or rigidity. Good bowel sounds.  Genitourinary: No CVA tenderness  Musculoskeletal: Obvious soft tissue swelling, reproducible tenderness, and deformity over R lateral anterior thigh. Pelvis stable, compartments to right anterior thigh are soft. . Distal pulses are intake and symmetric  Skin: Warm and dry. Skin is intact, no bruising or contusions.   Neurological:  Alert, awake, and appropriate.  Normal speech.  No acute focal neurological deficits are appreciated. Neurovascularly intact.  Psychiatric: Demented. Good eye contact. Appropriate in content.     ED Course   Procedures  ED Vital Signs:  Vitals:    01/10/25 1322 01/10/25 1545 01/10/25 1634 01/10/25 1642   BP: (!) 137/56   (!) 180/77   Pulse: 67   70   Resp: 18 18  20   Temp: 98.1 °F (36.7 °C)   98.2 °F (36.8 °C)   TempSrc: Oral   Oral   SpO2: 99%   95%   Weight:   61.2 kg (135 lb)    Height: 5' 2" (1.575 m)       01/10/25 1700 01/10/25 1711 01/10/25 1745 01/10/25 1800   BP: (!) 153/69  (!) 123/56 121/70   Pulse: 62   78   Resp: 20 18  20   Temp: 98 °F (36.7 °C)      TempSrc:       SpO2: (!) 94%   98%   Weight:       Height:        01/10/25 1904 01/10/25 1932 01/10/25 2018 01/10/25 2026   BP: (!) 124/55 (!) 105/52 (!) 189/74 (!) 189/74   Pulse: 67 66 66    Resp:   18    Temp:   98.4 °F (36.9 °C)    TempSrc:   Oral    SpO2: 98% 96% 97%    Weight:       Height:        01/10/25 2052   BP:    Pulse:    Resp: 18   Temp:    TempSrc:    SpO2:    Weight:    Height:        Abnormal Lab Results:  Labs Reviewed   CBC W/ AUTO DIFFERENTIAL - Abnormal       Result Value    WBC 9.89      RBC 4.15      Hemoglobin 12.0      Hematocrit 36.6 (*)     MCV 88      MCH 28.9      MCHC 32.8      RDW 13.0      Platelets 192      MPV 9.8      Immature Granulocytes 0.5      Gran # (ANC) 8.3 (*)     Immature Grans (Abs) 0.05 (*)     Lymph # 1.1      Mono # 0.4      Eos # 0.0      Baso # 0.03      " nRBC 0      Gran % 84.2 (*)     Lymph % 11.1 (*)     Mono % 3.9 (*)     Eosinophil % 0.0      Basophil % 0.3      Differential Method Automated     COMPREHENSIVE METABOLIC PANEL - Abnormal    Sodium 140      Potassium 4.5      Chloride 105      CO2 22 (*)     Glucose 168 (*)     BUN 12      Creatinine 0.7      Calcium 8.8      Total Protein 6.5      Albumin 3.5      Total Bilirubin 1.0      Alkaline Phosphatase 77      AST 23      ALT 21      eGFR >60      Anion Gap 13     URINALYSIS, REFLEX TO URINE CULTURE - Abnormal    Specimen UA Urine, Catheterized      Color, UA Yellow      Appearance, UA Clear      pH, UA 8.0      Specific Gravity, UA 1.020      Protein, UA Trace (*)     Glucose, UA Negative      Ketones, UA 2+ (*)     Bilirubin (UA) Negative      Occult Blood UA Negative      Nitrite, UA Negative      Urobilinogen, UA 2.0-3.0 (*)     Leukocytes, UA Negative      Narrative:     Specimen Source->Urine   URINALYSIS - Abnormal    Specimen UA Urine, Catheterized      Color, UA Yellow      Appearance, UA Clear      pH, UA 8.0      Specific Gravity, UA 1.020      Protein, UA Negative      Glucose, UA Negative      Ketones, UA 2+ (*)     Bilirubin (UA) Negative      Occult Blood UA Negative      Nitrite, UA Negative      Urobilinogen, UA 2.0-3.0 (*)     Leukocytes, UA Negative     POCT GLUCOSE - Abnormal    POCT Glucose 182 (*)    POCT GLUCOSE - Abnormal    POCT Glucose 174 (*)    POCT GLUCOSE MONITORING CONTINUOUS        All Lab Results:  Results for orders placed or performed during the hospital encounter of 01/10/25   CBC auto differential    Collection Time: 01/10/25  4:08 PM   Result Value Ref Range    WBC 9.89 3.90 - 12.70 K/uL    RBC 4.15 4.00 - 5.40 M/uL    Hemoglobin 12.0 12.0 - 16.0 g/dL    Hematocrit 36.6 (L) 37.0 - 48.5 %    MCV 88 82 - 98 fL    MCH 28.9 27.0 - 31.0 pg    MCHC 32.8 32.0 - 36.0 g/dL    RDW 13.0 11.5 - 14.5 %    Platelets 192 150 - 450 K/uL    MPV 9.8 9.2 - 12.9 fL    Immature Granulocytes  0.5 0.0 - 0.5 %    Gran # (ANC) 8.3 (H) 1.8 - 7.7 K/uL    Immature Grans (Abs) 0.05 (H) 0.00 - 0.04 K/uL    Lymph # 1.1 1.0 - 4.8 K/uL    Mono # 0.4 0.3 - 1.0 K/uL    Eos # 0.0 0.0 - 0.5 K/uL    Baso # 0.03 0.00 - 0.20 K/uL    nRBC 0 0 /100 WBC    Gran % 84.2 (H) 38.0 - 73.0 %    Lymph % 11.1 (L) 18.0 - 48.0 %    Mono % 3.9 (L) 4.0 - 15.0 %    Eosinophil % 0.0 0.0 - 8.0 %    Basophil % 0.3 0.0 - 1.9 %    Differential Method Automated    Comprehensive metabolic panel    Collection Time: 01/10/25  4:08 PM   Result Value Ref Range    Sodium 140 136 - 145 mmol/L    Potassium 4.5 3.5 - 5.1 mmol/L    Chloride 105 95 - 110 mmol/L    CO2 22 (L) 23 - 29 mmol/L    Glucose 168 (H) 70 - 110 mg/dL    BUN 12 8 - 23 mg/dL    Creatinine 0.7 0.5 - 1.4 mg/dL    Calcium 8.8 8.7 - 10.5 mg/dL    Total Protein 6.5 6.0 - 8.4 g/dL    Albumin 3.5 3.5 - 5.2 g/dL    Total Bilirubin 1.0 0.1 - 1.0 mg/dL    Alkaline Phosphatase 77 40 - 150 U/L    AST 23 10 - 40 U/L    ALT 21 10 - 44 U/L    eGFR >60 >60 mL/min/1.73 m^2    Anion Gap 13 8 - 16 mmol/L   Urinalysis, Reflex to Urine Culture Urine, Catheterized    Collection Time: 01/10/25  4:34 PM    Specimen: Urine, Clean Catch   Result Value Ref Range    Specimen UA Urine, Catheterized     Color, UA Yellow Yellow, Straw, Dolores    Appearance, UA Clear Clear    pH, UA 8.0 5.0 - 8.0    Specific Gravity, UA 1.020 1.005 - 1.030    Protein, UA Trace (A) Negative    Glucose, UA Negative Negative    Ketones, UA 2+ (A) Negative    Bilirubin (UA) Negative Negative    Occult Blood UA Negative Negative    Nitrite, UA Negative Negative    Urobilinogen, UA 2.0-3.0 (A) <2.0 EU/dL    Leukocytes, UA Negative Negative   Urinalysis Catheterized    Collection Time: 01/10/25  4:34 PM   Result Value Ref Range    Specimen UA Urine, Catheterized     Color, UA Yellow Yellow, Straw, Dolores    Appearance, UA Clear Clear    pH, UA 8.0 5.0 - 8.0    Specific Gravity, UA 1.020 1.005 - 1.030    Protein, UA Negative Negative     Glucose, UA Negative Negative    Ketones, UA 2+ (A) Negative    Bilirubin (UA) Negative Negative    Occult Blood UA Negative Negative    Nitrite, UA Negative Negative    Urobilinogen, UA 2.0-3.0 (A) <2.0 EU/dL    Leukocytes, UA Negative Negative   POCT glucose    Collection Time: 01/10/25  5:10 PM   Result Value Ref Range    POCT Glucose 182 (H) 70 - 110 mg/dL   POCT glucose    Collection Time: 01/10/25  5:43 PM   Result Value Ref Range    POCT Glucose 174 (H) 70 - 110 mg/dL         Imaging Results:  Imaging Results              X-Ray Chest AP Portable (Final result)  Result time 01/10/25 16:45:08      Final result by William Garduno MD (01/10/25 16:45:08)                   Impression:      No acute abnormality.      Electronically signed by: William Garduno  Date:    01/10/2025  Time:    16:45               Narrative:    EXAMINATION:  XR CHEST AP PORTABLE    CLINICAL HISTORY:  Encounter for preprocedural cardiovascular examination    TECHNIQUE:  Single frontal view of the chest was performed.    COMPARISON:  None    FINDINGS:  The lungs are clear, with normal appearance of pulmonary vasculature and no pleural effusion or pneumothorax.    The cardiac silhouette is normal in size. The hilar and mediastinal contours are unremarkable.    Bones are intact.                                       X-Ray Femur Ap/Lat Right (Final result)  Result time 01/10/25 14:25:06      Final result by Oscar Chavez MD (01/10/25 14:25:06)                   Impression:      1.  Comminuted, displaced and angulated right intertrochanteric fracture.      Electronically signed by: Oscar Chavez MD  Date:    01/10/2025  Time:    14:25               Narrative:    EXAMINATION:  XR FEMUR 2 VIEW RIGHT    CLINICAL HISTORY:  Unspecified fall, initial encounter    TECHNIQUE:  AP and lateral views of the right femur were performed.    COMPARISON:  None    FINDINGS:  There is a comminuted, displaced and angulated right intertrochanteric fracture.    No  other definite acute fractures are seen.  The knee joint is are grossly well maintained.  Hip joint is well maintained.  Visualized portions of the pelvis are intact.    Normal bowel gas pattern.  Vascular calcifications noted.                                              The Emergency Provider reviewed the vital signs and test results, which are outlined above.     ED Discussion        Medical Decision Making  DDX: 1. Right hip fx 2. Hip dislocation 3. Femur fx    Lab work reviewed and otherwise stable, CXR normal, xray right hip consistent with intertrochanteric hip fx with displacement and angulation, ortho consulted, will plan for surgery tomorrow, hosp med to admit.         Amount and/or Complexity of Data Reviewed  Independent Historian: spouse  Labs: ordered. Decision-making details documented in ED Course.  Radiology: ordered. Decision-making details documented in ED Course.  ECG/medicine tests: ordered.  Discussion of management or test interpretation with external provider(s):   4:01 PM: Discussed pt's case with Dr. Mclean (Orthopedic surgery) who recommends  admit. Keep NPO after midnight, start IV on her an hydrate. Pt needs R hip intramedullary rodding, do not place on blood thinners. May use mechanical DVT prophylaxis.    4:12 PM: Discussed case with Dara Henry NP (Hospital Medicine). Dr. Small agrees with current care and management of pt and accepts admission.   Admitting Service:   Admitting Physician: Dr. Small  Admit to: Inpt med/tele    4:12 PM: Re-evaluated pt. I have discussed test results, shared treatment plan, and the need for admission with patient and family at bedside. Pt and family express understanding at this time and agree with all information. All questions answered. Pt and family have no further questions or concerns at this time. Pt is ready for admit.        Risk  Prescription drug management.  Decision regarding hospitalization.  Diagnosis or treatment significantly  limited by social determinants of health.                ED Medication(s):  Medications   sodium chloride 0.9% flush 10 mL (has no administration in time range)   ondansetron injection 4 mg (has no administration in time range)   polyethylene glycol packet 17 g (has no administration in time range)   acetaminophen tablet 650 mg (has no administration in time range)   naloxone 0.4 mg/mL injection 0.02 mg (has no administration in time range)   glucose chewable tablet 16 g (has no administration in time range)   glucose chewable tablet 24 g (has no administration in time range)   dextrose 50% injection 12.5 g (has no administration in time range)   dextrose 50% injection 25 g (has no administration in time range)   glucagon (human recombinant) injection 1 mg (has no administration in time range)   insulin aspart U-100 pen 0-10 Units (2 Units Subcutaneous Given 1/10/25 1804)   hydrALAZINE injection 10 mg (has no administration in time range)   HYDROcodone-acetaminophen 5-325 mg per tablet 1 tablet (1 tablet Oral Given 1/10/25 2052)   traMADoL tablet 50 mg (has no administration in time range)   oxyCODONE immediate release tablet 5 mg (has no administration in time range)   amLODIPine tablet 10 mg (0 mg Oral Hold 1/10/25 1745)   atorvastatin tablet 20 mg (20 mg Oral Given 1/10/25 2026)   carvediloL tablet 25 mg (25 mg Oral Given 1/10/25 2026)   donepeziL tablet 10 mg (10 mg Oral Given 1/10/25 2026)   FLUoxetine capsule 40 mg (has no administration in time range)   levothyroxine tablet 100 mcg (has no administration in time range)   pantoprazole EC tablet 40 mg (has no administration in time range)   thiamine tablet 100 mg (has no administration in time range)   morphine injection 2 mg (has no administration in time range)   LORazepam injection 2 mg (has no administration in time range)   morphine injection 6 mg (6 mg Intravenous Given 1/10/25 1545)   ondansetron injection 4 mg (4 mg Intravenous Given 1/10/25 1543)    0.9% NaCl infusion (1,000 mLs Intravenous New Bag 1/10/25 1701)       Current Discharge Medication List                  Scribe Attestation:   Scribe #1: I performed the above scribed service and the documentation accurately describes the services I performed. I attest to the accuracy of the note.     Attending:   Physician Attestation Statement for Scribe #1: I, Emelyn Beverly MD, personally performed the services described in this documentation, as scribed by Stephanie Fernandez, in my presence, and it is both accurate and complete.           Clinical Impression       ICD-10-CM ICD-9-CM   1. Intertrochanteric fracture of right hip, closed, initial encounter  S72.141A 820.21   2. Fall  W19.XXXA E888.9   3. Preop cardiovascular exam  Z01.810 V72.81   4. Closed comminuted intertrochanteric fracture of right femur, initial encounter  S72.141A 820.21   5. History of dementia  Z86.59 V11.8   6. History of diabetes mellitus, type II  Z86.39 V12.29   7. Chest pain  R07.9 786.50       Disposition:   Disposition: Admitted  Condition: Emelyn Armstrong MD  01/10/25 8616

## 2025-01-10 NOTE — ASSESSMENT & PLAN NOTE
"Patient's FSGs are controlled on current medication regimen.  Last A1c reviewed-   Lab Results   Component Value Date    HGBA1C 7.3 (H) 04/24/2021     Most recent fingerstick glucose reviewed- No results for input(s): "POCTGLUCOSE" in the last 24 hours.  Current correctional scale  Medium  Maintain anti-hyperglycemic dose as follows-   Antihyperglycemics (From admission, onward)      Start     Stop Route Frequency Ordered    01/10/25 1749  insulin aspart U-100 pen 0-10 Units         -- SubQ Before meals & nightly PRN 01/10/25 1650          Hold Oral hypoglycemics while patient is in the hospital.  "

## 2025-01-10 NOTE — ASSESSMENT & PLAN NOTE
"  Patient has Systolic (HFrEF) heart failure that is Chronic. On presentation their CHF was well compensated. Most recent BNP and echo results are listed below.  No results for input(s): "BNP" in the last 72 hours.  Latest ECHO  Results for orders placed during the hospital encounter of 04/24/21    Echo Color Flow Doppler? Yes    Interpretation Summary  · Concentric hypertrophy and normal systolic function.  · The estimated ejection fraction is 55%.  · Indeterminate left ventricular diastolic function.  · With normal right ventricular systolic function.  · Moderate left atrial enlargement.    Current Heart Failure Medications  hydrALAZINE injection 10 mg, Every 6 hours PRN, Intravenous    Plan  - Monitor strict I&Os and daily weights.    - Place on telemetry  - Low sodium diet  - Place on fluid restriction of 1.5 L.   - Cardiology has not been consulted  - The patient's volume status is at their baseline         "

## 2025-01-10 NOTE — SUBJECTIVE & OBJECTIVE
Past Medical History:   Diagnosis Date    Coronary artery disease     Diabetes mellitus     Hypertension        Past Surgical History:   Procedure Laterality Date    APPENDECTOMY      CARDIAC SURGERY      CHOLECYSTECTOMY      CORONARY ARTERY BYPASS GRAFT      HYSTERECTOMY      INTRAMEDULLARY RODDING OF FEMUR Left 4/25/2021    Procedure: INSERTION, INTREMEDULLARY FÁTIMA, FEMUR;  Surgeon: An Mart MD;  Location: [facility];  Service: Orthopedics;  Laterality: Left;  Left hip cephalomedullarly nail, or spinal/sifi block    THYROID SURGERY  1995       Review of patient's allergies indicates:  No Known Allergies    No current facility-administered medications on file prior to encounter.     Current Outpatient Medications on File Prior to Encounter   Medication Sig    meclizine (ANTIVERT) 25 mg tablet Take 25 mg by mouth 3 (three) times daily as needed.    alendronate (FOSAMAX) 70 MG tablet TAKE 1 TABLET BY MOUTH 30 MINUTES BEFORE THE FIRST FOOD, BEVERAGE OR MEDICINE OF THE DAY WITH PLAIN WATER ON SUNDAY    amLODIPine (NORVASC) 10 MG tablet Take 10 mg by mouth.    amLODIPine (NORVASC) 5 MG tablet Take 5 mg by mouth once daily.    aspirin 81 MG Chew Take 1 tablet (81 mg total) by mouth once daily.    atorvastatin (LIPITOR) 20 MG tablet Take 1 tablet (20 mg total) by mouth every evening.    busPIRone (BUSPAR) 5 MG Tab Take 5 mg by mouth 2 (two) times daily.    carvediloL (COREG) 25 MG tablet Take 1 tablet (25 mg total) by mouth 2 (two) times daily.    chlordiazepoxide (LIBRIUM) 10 MG capsule Take 1 capsule (10 mg total) by mouth every evening. for 5 days    denosumab (PROLIA) 60 mg/mL Syrg as directed    donepeziL (ARICEPT) 10 MG tablet Take 10 mg by mouth every evening.    FLUoxetine 40 MG capsule Take 40 mg by mouth once daily.    folic acid (FOLVITE) 1 MG tablet Take 1 tablet (1 mg total) by mouth once daily.    HYDROcodone-acetaminophen (NORCO) 5-325 mg per tablet Take 1 tablet by mouth nightly as needed for Pain.     "ibandronate (BONIVA) 150 mg tablet Take 150 mg by mouth every 30 days.    insulin aspart U-100 (NOVOLOG) 100 unit/mL (3 mL) InPn pen Inject 0-5 Units into the skin before meals and at bedtime as needed (Hyperglycemia). **LOW CORRECTION DOSE**  Blood Glucose  mg/dL                  Pre-meal                2200  151-200                0 unit                      0 unit  201-250                2 units                    1 unit  251-300                3 units                    1 unit  301-350                4 units                    2 units  >350                     5 units                    3 units  Administer subcutaneously if needed at times designated by monitoring schedule.   DO NOT HOLD correction dose insulin for patients who are  NPO. "HIGH ALERT MEDICATION" - Administer with meals or TF/TPN.    insulin detemir U-100 (LEVEMIR FLEXTOUCH) 100 unit/mL (3 mL) SubQ InPn pen Inject 10 Units into the skin every evening.    insulin glargine, TOUJEO, (TOUJEO SOLOSTAR U-300 INSULIN) 300 unit/mL (1.5 mL) InPn pen 26 units    levothyroxine (SYNTHROID) 100 MCG tablet Take 100 mcg by mouth before breakfast.    LIDOcaine (LIDODERM) 5 % Place 1 patch onto the skin once daily. Remove & Discard patch within 12 hours or as directed by MD    losartan (COZAAR) 100 MG tablet Take 1 tablet (100 mg total) by mouth once daily.    metFORMIN (GLUCOPHAGE) 500 MG tablet Take 500 mg by mouth once daily.    mupirocin (BACTROBAN) 2 % ointment by Nasal route 2 (two) times daily.    pantoprazole (PROTONIX) 20 MG tablet Take 20 mg by mouth once daily.    sucralfate (CARAFATE) 1 gram tablet 1 tablet on an empty stomach    thiamine 100 MG tablet Take 1 tablet (100 mg total) by mouth once daily.    thiamine mononitrate, vit B1, (VITAMIN B-1, MONONITRATE,) 100 mg Tab 1 tablet    tirzepatide (MOUNJARO) 2.5 mg/0.5 mL PnIj INJECT 2 PENS UNDER THE SKIN EVERY 7 DAYS.    traZODone (DESYREL) 100 MG tablet Take 100 mg by mouth every evening.     Family " History       Problem Relation (Age of Onset)    Chronic back pain Mother    Heart disease Father    Hypertension Father          Tobacco Use    Smoking status: Never    Smokeless tobacco: Never   Substance and Sexual Activity    Alcohol use: Yes     Alcohol/week: 2.0 standard drinks of alcohol     Types: 2 Glasses of wine per week     Comment: nightly, 2 glasses of wine    Drug use: Never    Sexual activity: Not on file     Review of Systems   Constitutional:  Positive for activity change, appetite change and fatigue.   Respiratory:  Negative for shortness of breath.    Cardiovascular:  Positive for leg swelling (Right upper thigh). Negative for chest pain.   Gastrointestinal:  Positive for nausea and vomiting. Negative for abdominal distention and constipation.   Musculoskeletal:  Positive for arthralgias, gait problem, joint swelling and myalgias.   Skin:  Positive for color change.   Neurological:  Positive for weakness.   Psychiatric/Behavioral:  Positive for agitation.    All other systems reviewed and are negative.    Objective:     Vital Signs (Most Recent):  Temp: 98.2 °F (36.8 °C) (01/10/25 1642)  Pulse: 70 (01/10/25 1642)  Resp: 18 (01/10/25 1711)  BP: (!) 180/77 (01/10/25 1642)  SpO2: 95 % (01/10/25 1642) Vital Signs (24h Range):  Temp:  [98.1 °F (36.7 °C)-98.2 °F (36.8 °C)] 98.2 °F (36.8 °C)  Pulse:  [67-70] 70  Resp:  [18-20] 18  SpO2:  [95 %-99 %] 95 %  BP: (137-180)/(56-77) 180/77     Weight: 61.2 kg (135 lb)  Body mass index is 24.69 kg/m².     Physical Exam  Vitals and nursing note reviewed.   Constitutional:       General: She is not in acute distress.     Appearance: She is normal weight. She is ill-appearing.   HENT:      Head: Normocephalic and atraumatic.      Right Ear: Hearing and external ear normal.      Left Ear: Hearing and external ear normal.      Nose: No rhinorrhea.      Right Sinus: No maxillary sinus tenderness or frontal sinus tenderness.      Left Sinus: No maxillary sinus  tenderness or frontal sinus tenderness.      Mouth/Throat:      Mouth: No oral lesions.      Pharynx: Uvula midline.   Eyes:      General:         Right eye: No discharge.         Left eye: No discharge.      Conjunctiva/sclera: Conjunctivae normal.      Pupils: Pupils are equal, round, and reactive to light.   Neck:      Thyroid: No thyromegaly.      Vascular: No carotid bruit.      Trachea: No tracheal deviation.   Cardiovascular:      Rate and Rhythm: Normal rate and regular rhythm.      Pulses:           Dorsalis pedis pulses are 2+ on the right side and 2+ on the left side.      Heart sounds: S1 normal and S2 normal. Murmur heard.   Pulmonary:      Effort: Pulmonary effort is normal. No respiratory distress.      Breath sounds: Normal breath sounds.   Abdominal:      General: Bowel sounds are decreased. There is distension.      Palpations: Abdomen is soft. There is no mass.      Tenderness: There is no abdominal tenderness.   Musculoskeletal:         General: Normal range of motion.      Cervical back: Normal range of motion.      Right upper leg: Swelling and deformity present.        Legs:       Comments: NVI intact on right   Lymphadenopathy:      Cervical: No cervical adenopathy.      Upper Body:      Right upper body: No supraclavicular adenopathy.      Left upper body: No supraclavicular adenopathy.   Skin:     General: Skin is warm and dry.      Capillary Refill: Capillary refill takes less than 2 seconds.      Findings: No rash.   Neurological:      Mental Status: Mental status is at baseline. She is lethargic.   Psychiatric:         Mood and Affect: Mood is anxious.         Behavior: Behavior is slowed.              CRANIAL NERVES     CN III, IV, VI   Pupils are equal, round, and reactive to light.       Significant Labs: All pertinent labs within the past 24 hours have been reviewed.  CBC:   Recent Labs   Lab 01/10/25  1608   WBC 9.89   HGB 12.0   HCT 36.6*        CMP:   Recent Labs   Lab  01/10/25  1608      K 4.5      CO2 22*   *   BUN 12   CREATININE 0.7   CALCIUM 8.8   PROT 6.5   ALBUMIN 3.5   BILITOT 1.0   ALKPHOS 77   AST 23   ALT 21   ANIONGAP 13       Significant Imaging: I have reviewed all pertinent imaging results/findings within the past 24 hours.

## 2025-01-10 NOTE — ASSESSMENT & PLAN NOTE
Xray: Comminuted, displaced and angulated right intertrochanteric fracture. Mid right thigh swelling. Pedal pulse: 2+ on initial evaluation    --Monitor NVI of right leg q4h  --Serial H/H  --Type and screen  --tele  --vitals per protocol  --NPO after midnight  --Orthopedic surgery consulted, planned for surgery in AM

## 2025-01-10 NOTE — ASSESSMENT & PLAN NOTE
Patient's blood pressure range in the last 24 hours was: BP  Min: 137/56  Max: 180/77.The patient's inpatient anti-hypertensive regimen is listed below:  Current Antihypertensives  , , Oral  hydrALAZINE injection 10 mg, Every 6 hours PRN, Intravenous  amLODIPine tablet 10 mg, Daily, Oral  carvediloL tablet 25 mg, 2 times daily, Oral    Plan  - BP is controlled, no changes needed to their regimen

## 2025-01-10 NOTE — FIRST PROVIDER EVALUATION
Medical screening examination initiated.  I have conducted a focused provider triage encounter, findings are as follows:    Brief history of present illness:   patient had a fall this morning uncertain cause complaining of right leg pain    There were no vitals filed for this visit.    Pertinent physical exam:   right thigh deformity noted    Brief workup plan:   imaging    Preliminary workup initiated; this workup will be continued and followed by the physician or advanced practice provider that is assigned to the patient when roomed.

## 2025-01-11 ENCOUNTER — ANESTHESIA (OUTPATIENT)
Dept: SURGERY | Facility: HOSPITAL | Age: 80
DRG: 481 | End: 2025-01-11
Payer: MEDICARE

## 2025-01-11 ENCOUNTER — ANESTHESIA EVENT (OUTPATIENT)
Dept: SURGERY | Facility: HOSPITAL | Age: 80
DRG: 481 | End: 2025-01-11
Payer: MEDICARE

## 2025-01-11 LAB
ALBUMIN SERPL BCP-MCNC: 2.9 G/DL (ref 3.5–5.2)
ALP SERPL-CCNC: 165 U/L (ref 40–150)
ALT SERPL W/O P-5'-P-CCNC: 313 U/L (ref 10–44)
ANION GAP SERPL CALC-SCNC: 6 MMOL/L (ref 8–16)
AST SERPL-CCNC: 424 U/L (ref 10–40)
BASOPHILS # BLD AUTO: 0.02 K/UL (ref 0–0.2)
BASOPHILS NFR BLD: 0.3 % (ref 0–1.9)
BILIRUB SERPL-MCNC: 1.1 MG/DL (ref 0.1–1)
BUN SERPL-MCNC: 17 MG/DL (ref 8–23)
CALCIUM SERPL-MCNC: 8.4 MG/DL (ref 8.7–10.5)
CHLORIDE SERPL-SCNC: 105 MMOL/L (ref 95–110)
CO2 SERPL-SCNC: 29 MMOL/L (ref 23–29)
CREAT SERPL-MCNC: 0.7 MG/DL (ref 0.5–1.4)
DIFFERENTIAL METHOD BLD: ABNORMAL
EOSINOPHIL # BLD AUTO: 0 K/UL (ref 0–0.5)
EOSINOPHIL NFR BLD: 0.3 % (ref 0–8)
ERYTHROCYTE [DISTWIDTH] IN BLOOD BY AUTOMATED COUNT: 13.2 % (ref 11.5–14.5)
EST. GFR  (NO RACE VARIABLE): >60 ML/MIN/1.73 M^2
GLUCOSE SERPL-MCNC: 117 MG/DL (ref 70–110)
HCT VFR BLD AUTO: 33 % (ref 37–48.5)
HGB BLD-MCNC: 10.4 G/DL (ref 12–16)
IMM GRANULOCYTES # BLD AUTO: 0.02 K/UL (ref 0–0.04)
IMM GRANULOCYTES NFR BLD AUTO: 0.3 % (ref 0–0.5)
LYMPHOCYTES # BLD AUTO: 1.3 K/UL (ref 1–4.8)
LYMPHOCYTES NFR BLD: 22 % (ref 18–48)
MAGNESIUM SERPL-MCNC: 1.8 MG/DL (ref 1.6–2.6)
MCH RBC QN AUTO: 29 PG (ref 27–31)
MCHC RBC AUTO-ENTMCNC: 31.5 G/DL (ref 32–36)
MCV RBC AUTO: 92 FL (ref 82–98)
MONOCYTES # BLD AUTO: 0.5 K/UL (ref 0.3–1)
MONOCYTES NFR BLD: 8.2 % (ref 4–15)
NEUTROPHILS # BLD AUTO: 4.1 K/UL (ref 1.8–7.7)
NEUTROPHILS NFR BLD: 68.9 % (ref 38–73)
NRBC BLD-RTO: 0 /100 WBC
PHOSPHATE SERPL-MCNC: 4.1 MG/DL (ref 2.7–4.5)
PLATELET # BLD AUTO: 176 K/UL (ref 150–450)
PMV BLD AUTO: 10 FL (ref 9.2–12.9)
POCT GLUCOSE: 132 MG/DL (ref 70–110)
POCT GLUCOSE: 158 MG/DL (ref 70–110)
POCT GLUCOSE: 315 MG/DL (ref 70–110)
POCT GLUCOSE: 97 MG/DL (ref 70–110)
POTASSIUM SERPL-SCNC: 4 MMOL/L (ref 3.5–5.1)
PROT SERPL-MCNC: 5.3 G/DL (ref 6–8.4)
RBC # BLD AUTO: 3.59 M/UL (ref 4–5.4)
SODIUM SERPL-SCNC: 140 MMOL/L (ref 136–145)
WBC # BLD AUTO: 5.95 K/UL (ref 3.9–12.7)

## 2025-01-11 PROCEDURE — 36000711: Performed by: ORTHOPAEDIC SURGERY

## 2025-01-11 PROCEDURE — 37000009 HC ANESTHESIA EA ADD 15 MINS: Performed by: ORTHOPAEDIC SURGERY

## 2025-01-11 PROCEDURE — 27245 TREAT THIGH FRACTURE: CPT | Mod: RT,,, | Performed by: ORTHOPAEDIC SURGERY

## 2025-01-11 PROCEDURE — 36000710: Performed by: ORTHOPAEDIC SURGERY

## 2025-01-11 PROCEDURE — 25000003 PHARM REV CODE 250: Performed by: ORTHOPAEDIC SURGERY

## 2025-01-11 PROCEDURE — 25000003 PHARM REV CODE 250: Performed by: FAMILY MEDICINE

## 2025-01-11 PROCEDURE — 71000033 HC RECOVERY, INTIAL HOUR: Performed by: ORTHOPAEDIC SURGERY

## 2025-01-11 PROCEDURE — 80321 ALCOHOLS BIOMARKERS 1OR 2: CPT | Performed by: FAMILY MEDICINE

## 2025-01-11 PROCEDURE — 85025 COMPLETE CBC W/AUTO DIFF WBC: CPT | Performed by: NURSE PRACTITIONER

## 2025-01-11 PROCEDURE — 25000003 PHARM REV CODE 250: Performed by: NURSE PRACTITIONER

## 2025-01-11 PROCEDURE — C1713 ANCHOR/SCREW BN/BN,TIS/BN: HCPCS | Performed by: ORTHOPAEDIC SURGERY

## 2025-01-11 PROCEDURE — 25000003 PHARM REV CODE 250: Performed by: NURSE ANESTHETIST, CERTIFIED REGISTERED

## 2025-01-11 PROCEDURE — 11000001 HC ACUTE MED/SURG PRIVATE ROOM

## 2025-01-11 PROCEDURE — 83735 ASSAY OF MAGNESIUM: CPT | Performed by: NURSE PRACTITIONER

## 2025-01-11 PROCEDURE — 63600175 PHARM REV CODE 636 W HCPCS: Performed by: ORTHOPAEDIC SURGERY

## 2025-01-11 PROCEDURE — 63600175 PHARM REV CODE 636 W HCPCS: Performed by: NURSE ANESTHETIST, CERTIFIED REGISTERED

## 2025-01-11 PROCEDURE — 37000008 HC ANESTHESIA 1ST 15 MINUTES: Performed by: ORTHOPAEDIC SURGERY

## 2025-01-11 PROCEDURE — 80053 COMPREHEN METABOLIC PANEL: CPT | Performed by: NURSE PRACTITIONER

## 2025-01-11 PROCEDURE — 27201423 OPTIME MED/SURG SUP & DEVICES STERILE SUPPLY: Performed by: ORTHOPAEDIC SURGERY

## 2025-01-11 PROCEDURE — C1769 GUIDE WIRE: HCPCS | Performed by: ORTHOPAEDIC SURGERY

## 2025-01-11 PROCEDURE — 84100 ASSAY OF PHOSPHORUS: CPT | Performed by: NURSE PRACTITIONER

## 2025-01-11 PROCEDURE — 0QS636Z REPOSITION RIGHT UPPER FEMUR WITH INTRAMEDULLARY INTERNAL FIXATION DEVICE, PERCUTANEOUS APPROACH: ICD-10-PCS | Performed by: ORTHOPAEDIC SURGERY

## 2025-01-11 RX ORDER — BISACODYL 10 MG/1
10 SUPPOSITORY RECTAL DAILY PRN
Status: DISCONTINUED | OUTPATIENT
Start: 2025-01-11 | End: 2025-01-15 | Stop reason: HOSPADM

## 2025-01-11 RX ORDER — CHLORHEXIDINE GLUCONATE ORAL RINSE 1.2 MG/ML
10 SOLUTION DENTAL 2 TIMES DAILY
Status: DISCONTINUED | OUTPATIENT
Start: 2025-01-11 | End: 2025-01-15 | Stop reason: HOSPADM

## 2025-01-11 RX ORDER — ROCURONIUM BROMIDE 10 MG/ML
INJECTION, SOLUTION INTRAVENOUS
Status: DISCONTINUED | OUTPATIENT
Start: 2025-01-11 | End: 2025-01-11

## 2025-01-11 RX ORDER — DEXAMETHASONE SODIUM PHOSPHATE 4 MG/ML
INJECTION, SOLUTION INTRA-ARTICULAR; INTRALESIONAL; INTRAMUSCULAR; INTRAVENOUS; SOFT TISSUE
Status: DISCONTINUED | OUTPATIENT
Start: 2025-01-11 | End: 2025-01-11

## 2025-01-11 RX ORDER — ONDANSETRON HYDROCHLORIDE 2 MG/ML
4 INJECTION, SOLUTION INTRAVENOUS DAILY PRN
Status: DISCONTINUED | OUTPATIENT
Start: 2025-01-11 | End: 2025-01-15 | Stop reason: HOSPADM

## 2025-01-11 RX ORDER — ONDANSETRON HYDROCHLORIDE 2 MG/ML
4 INJECTION, SOLUTION INTRAVENOUS EVERY 12 HOURS PRN
Status: DISCONTINUED | OUTPATIENT
Start: 2025-01-11 | End: 2025-01-15 | Stop reason: HOSPADM

## 2025-01-11 RX ORDER — CEFAZOLIN 2 G/1
2 INJECTION, POWDER, FOR SOLUTION INTRAMUSCULAR; INTRAVENOUS
Status: COMPLETED | OUTPATIENT
Start: 2025-01-11 | End: 2025-01-12

## 2025-01-11 RX ORDER — HYDROMORPHONE HYDROCHLORIDE 1 MG/ML
0.2 INJECTION, SOLUTION INTRAMUSCULAR; INTRAVENOUS; SUBCUTANEOUS EVERY 5 MIN PRN
Status: DISCONTINUED | OUTPATIENT
Start: 2025-01-11 | End: 2025-01-15 | Stop reason: HOSPADM

## 2025-01-11 RX ORDER — CEFAZOLIN SODIUM 1 G/3ML
INJECTION, POWDER, FOR SOLUTION INTRAMUSCULAR; INTRAVENOUS
Status: DISCONTINUED | OUTPATIENT
Start: 2025-01-11 | End: 2025-01-11

## 2025-01-11 RX ORDER — LIDOCAINE HYDROCHLORIDE 10 MG/ML
INJECTION, SOLUTION EPIDURAL; INFILTRATION; INTRACAUDAL; PERINEURAL
Status: DISCONTINUED | OUTPATIENT
Start: 2025-01-11 | End: 2025-01-11

## 2025-01-11 RX ORDER — ONDANSETRON HYDROCHLORIDE 2 MG/ML
INJECTION, SOLUTION INTRAVENOUS
Status: DISCONTINUED | OUTPATIENT
Start: 2025-01-11 | End: 2025-01-11

## 2025-01-11 RX ORDER — PROPOFOL 10 MG/ML
VIAL (ML) INTRAVENOUS
Status: DISCONTINUED | OUTPATIENT
Start: 2025-01-11 | End: 2025-01-11

## 2025-01-11 RX ORDER — SODIUM CHLORIDE 9 MG/ML
INJECTION, SOLUTION INTRAVENOUS CONTINUOUS
Status: DISCONTINUED | OUTPATIENT
Start: 2025-01-11 | End: 2025-01-12

## 2025-01-11 RX ORDER — GLUCAGON 1 MG
1 KIT INJECTION
Status: DISCONTINUED | OUTPATIENT
Start: 2025-01-11 | End: 2025-01-15 | Stop reason: HOSPADM

## 2025-01-11 RX ORDER — TALC
6 POWDER (GRAM) TOPICAL NIGHTLY PRN
Status: DISCONTINUED | OUTPATIENT
Start: 2025-01-12 | End: 2025-01-15 | Stop reason: HOSPADM

## 2025-01-11 RX ORDER — EPHEDRINE SULFATE 50 MG/ML
INJECTION, SOLUTION INTRAVENOUS
Status: DISCONTINUED | OUTPATIENT
Start: 2025-01-11 | End: 2025-01-11

## 2025-01-11 RX ORDER — ROPIVACAINE/EPI/CLONIDINE/KET 2.46-0.005
SYRINGE (ML) INJECTION
Status: DISCONTINUED | OUTPATIENT
Start: 2025-01-11 | End: 2025-01-11 | Stop reason: HOSPADM

## 2025-01-11 RX ORDER — AMOXICILLIN 250 MG
1 CAPSULE ORAL 2 TIMES DAILY
Status: DISCONTINUED | OUTPATIENT
Start: 2025-01-11 | End: 2025-01-14

## 2025-01-11 RX ADMIN — SENNOSIDES AND DOCUSATE SODIUM 1 TABLET: 50; 8.6 TABLET ORAL at 09:01

## 2025-01-11 RX ADMIN — INSULIN ASPART 2 UNITS: 100 INJECTION, SOLUTION INTRAVENOUS; SUBCUTANEOUS at 05:01

## 2025-01-11 RX ADMIN — FLUOXETINE HYDROCHLORIDE 40 MG: 20 CAPSULE ORAL at 09:01

## 2025-01-11 RX ADMIN — ROCURONIUM BROMIDE 40 MG: 10 INJECTION, SOLUTION INTRAVENOUS at 01:01

## 2025-01-11 RX ADMIN — LIDOCAINE HYDROCHLORIDE 50 MG: 10 SOLUTION INTRAVENOUS at 01:01

## 2025-01-11 RX ADMIN — CEFAZOLIN 2 G: 2 INJECTION, POWDER, FOR SOLUTION INTRAMUSCULAR; INTRAVENOUS at 09:01

## 2025-01-11 RX ADMIN — Medication 100 MG: at 09:01

## 2025-01-11 RX ADMIN — INSULIN ASPART 4 UNITS: 100 INJECTION, SOLUTION INTRAVENOUS; SUBCUTANEOUS at 09:01

## 2025-01-11 RX ADMIN — OXYCODONE 5 MG: 5 TABLET ORAL at 12:01

## 2025-01-11 RX ADMIN — DEXAMETHASONE SODIUM PHOSPHATE 8 MG: 4 INJECTION, SOLUTION INTRA-ARTICULAR; INTRALESIONAL; INTRAMUSCULAR; INTRAVENOUS; SOFT TISSUE at 02:01

## 2025-01-11 RX ADMIN — PANTOPRAZOLE SODIUM 40 MG: 40 TABLET, DELAYED RELEASE ORAL at 09:01

## 2025-01-11 RX ADMIN — ONDANSETRON 4 MG: 2 INJECTION INTRAMUSCULAR; INTRAVENOUS at 02:01

## 2025-01-11 RX ADMIN — MELATONIN TAB 3 MG 6 MG: 3 TAB at 11:01

## 2025-01-11 RX ADMIN — ATORVASTATIN CALCIUM 20 MG: 10 TABLET, FILM COATED ORAL at 09:01

## 2025-01-11 RX ADMIN — CHLORHEXIDINE GLUCONATE 0.12% ORAL RINSE 10 ML: 1.2 LIQUID ORAL at 09:01

## 2025-01-11 RX ADMIN — CEFAZOLIN 2 G: 330 INJECTION, POWDER, FOR SOLUTION INTRAMUSCULAR; INTRAVENOUS at 02:01

## 2025-01-11 RX ADMIN — EPHEDRINE SULFATE 10 MG: 50 INJECTION INTRAVENOUS at 02:01

## 2025-01-11 RX ADMIN — POLYETHYLENE GLYCOL 3350 17 G: 17 POWDER, FOR SOLUTION ORAL at 09:01

## 2025-01-11 RX ADMIN — DONEPEZIL HYDROCHLORIDE 10 MG: 5 TABLET, FILM COATED ORAL at 09:01

## 2025-01-11 RX ADMIN — LEVOTHYROXINE SODIUM 100 MCG: 100 TABLET ORAL at 06:01

## 2025-01-11 RX ADMIN — CARVEDILOL 25 MG: 12.5 TABLET, FILM COATED ORAL at 09:01

## 2025-01-11 RX ADMIN — AMLODIPINE BESYLATE 10 MG: 10 TABLET ORAL at 09:01

## 2025-01-11 RX ADMIN — HYDROCODONE BITARTRATE AND ACETAMINOPHEN 1 TABLET: 5; 325 TABLET ORAL at 09:01

## 2025-01-11 RX ADMIN — SODIUM CHLORIDE, SODIUM LACTATE, POTASSIUM CHLORIDE, AND CALCIUM CHLORIDE: .6; .31; .03; .02 INJECTION, SOLUTION INTRAVENOUS at 01:01

## 2025-01-11 RX ADMIN — PROPOFOL 100 MG: 10 INJECTION, EMULSION INTRAVENOUS at 01:01

## 2025-01-11 RX ADMIN — SODIUM CHLORIDE: 9 INJECTION, SOLUTION INTRAVENOUS at 04:01

## 2025-01-11 NOTE — OP NOTE
'[facility] S[facility]  Orthopedic Surgery  Operative Note    SUMMARY     Date of Procedure: 1/11/2025     Procedure: Procedure(s) (LRB):  INSERTION, INTRAMEDULLARY FÁTIMA, FEMUR, PROXIMAL (Right)       Surgeons and Role:     * Igor Mclean MD - Primary    Assisting Surgeon:  Cami LAGUNA    Pre-Operative Diagnosis: Intertrochanteric fracture of right hip, closed, initial encounter [S72.141A]    Post-Operative Diagnosis: Post-Op Diagnosis Codes:     * Intertrochanteric fracture of right hip, closed, initial encounter [S72.141A]    Anesthesia: General    Significant Surgical Tasks Conducted by the Assistant(s), if Applicable:  Needed assistance to position and help with holding the extremity and multiple retractors in order to perform surgery safely and efficiently    Complications: none   Injection RE CK mixed with injectable saline  Estimated Blood Loss (EBL):  40 mL           Implants:  Arthrex intramedullary hip fátima size 10 mm x 360, dynamic lag screw 95, mid shaft locking screw size 36 (this fátima AOS with middle of the fátima locking as well as distal locking only use the middle of the fátima locking screw to act as a short fátima    Specimens:  None           Condition: Good    Disposition: PACU - hemodynamically stable.    Attestation: I performed the procedure.    Description:  Patient sustained a right hip intertrochanteric fracture.  Discussed the risk of surgery versus non operative treatment.  This is displaced.  Discussed intramedullary rodding which is standard of care versus side plate with compression screws.  The risk of nerve damage, vascular damage, infection, blood clot, fat clot, nonunion, malunion, failure of the hardware, failure of the bone with cut out in the need for further care and treatment discussed   After discussing with the patient she already had a fátima placed on the other side.  We discussed with the family and the patient that probably would need to go to skilled facility after surgery.     After administering general anesthesia patient placed on the [place] table.  The fracture was reduced with traction and internal rotation until became anatomic.  Checked it on AP and lateral views with the C-arm.  We washed her right lower extremity with soaking alcohol twice and ChloraPrep twice and draped usual sterile fashion.  Made an incision 2 cm proximal to the greater troch approximately 5 cm in length.  Released the fascia of the gluteus.  Using a guide pin for a trochanteric entry site with the tissue protector we are able to enter through the greater troch down the shaft.  Checked it on AP and lateral views confirmed the guide pin inside the femur.  With the soft tissue protector proceeded with the proximal Reamer opening down to the lesser troch.  Put the long ball-tipped guidewire down the shaft confirmed it on x-ray AP and lateral views.  Measured the length.  Opted for a 10 mm by 36 cm alok.  We reamed to 11.5.  Slit the alok down the shaft.  Tapped it to the appropriate position.  Then through the targeting device made a small lateral incision around 2 cm and put the guide down against the bone.  Passed the guidewire through the lateral cortex into the metal of the neck and head checked it on AP and lateral we were that metal.  Measured the size.  Proceeded reaming.  Post the lag screw in and after we put the lag screw in.  Took the traction off and apply compression.  We wanted to keep the lag screw as dynamic with compression took the locking lag screw out to allow her to become dynamic.  At that point through the target her we did proceed with making small stab incision in the middle of the shaft of the femur.  The guide against the bone for a middle of the femur locking and we drilled across.  Screw placed in through the targeting device.  Took the inserters out.  Confirmed on the C-arm AP and lateral on the hip AP lateral on the mid shaft in AP lateral on the distal femur appropriate alignment of  the hardware.  Irrigated the wounds copiously.  Injected the wounds with RE CK for postop pain control.  Closed the fascia with 1. Vicryl and subcutaneous tissues 2-0 Vicryl and the skin in a running subcuticular fashion.  All 3 incisions closed in the similar fashion.  Sterile dressing applied.

## 2025-01-11 NOTE — ASSESSMENT & PLAN NOTE
Xray: Comminuted, displaced and angulated right intertrochanteric fracture. Mid right thigh swelling. Pedal pulse: 2+ on initial evaluation    --Monitor NVI of right leg q4h  --Serial H/H  --Type and screen  --tele  --vitals per protocol  --NPO after midnight  --Orthopedic surgery consulted, planned for surgery in AM    1/11/25  Plans for right femur intramedullary alok insertion today.   Will order PT/TO postoperatively

## 2025-01-11 NOTE — ASSESSMENT & PLAN NOTE
Patient's FSGs are controlled on current medication regimen.  Last A1c reviewed-   Lab Results   Component Value Date    HGBA1C 7.3 (H) 04/24/2021     Most recent fingerstick glucose reviewed-   Recent Labs   Lab 01/10/25  1710 01/10/25  1743 01/11/25  0542 01/11/25  1157   POCTGLUCOSE 182* 174* 132* 97     Current correctional scale  Medium  Maintain anti-hyperglycemic dose as follows-   Antihyperglycemics (From admission, onward)    Start     Stop Route Frequency Ordered    01/10/25 1749  insulin aspart U-100 pen 0-10 Units         -- SubQ Before meals & nightly PRN 01/10/25 1650        Hold Oral hypoglycemics while patient is in the hospital.

## 2025-01-11 NOTE — PHARMACY MED REC
"Admission Medication History     The home medication history was taken by Shireen Peña.    You may go to "Admission" then "Reconcile Home Medications" tabs to review and/or act upon these items.     The home medication list has been updated by the Pharmacy department.   Please read ALL comments highlighted in yellow.   Please address this information as you see fit.    Feel free to contact us if you have any questions or require assistance.      The medications listed below were removed from the home medication list. Please reorder if appropriate:  Patient reports no longer taking the following medication(s):  Fosamax 70 mg  Aspirin 81 mg  Librium 10 mg  Prolia 60 mg/mL syrg  Folic acid 1 mg  Norco 5-325 mg  Boniva 150 mg  Novolog 100 unit/mL   Levemir flextouch 100 unit/3 mL  Metformin 500 mg  Bactroban 2% ointment  Thiamin 100 mg  Vitamin B1, mononitrate 100 mg  Mounjaro 2.5 mg/0.5 mL     Medications listed below were obtained from: Patient/family and Analytic software- Colin Mejia ( at bedside)      LAST MED REC COMPLETED:   Shireen Peña  [id number]    Current Outpatient Medications on File Prior to Encounter   Medication Sig Dispense Refill Last Dose/Taking    0.9% NaCl SolP 250 mL with lecanemab-irmb 100 mg/mL Soln 10 mg/kg Inject 10 mg/kg into the vein every 14 (fourteen) days.   Past Week    amLODIPine (NORVASC) 10 MG tablet Take 10 mg by mouth.   1/9/2025    busPIRone (BUSPAR) 5 MG Tab Take 5 mg by mouth 2 (two) times daily.   Past Month    carvediloL (COREG) 25 MG tablet Take 1 tablet (25 mg total) by mouth 2 (two) times daily.   1/9/2025    donepeziL (ARICEPT) 10 MG tablet Take 10 mg by mouth every evening.   1/9/2025    FLUoxetine 40 MG capsule Take 40 mg by mouth once daily.   1/9/2025    insulin glargine, TOUJEO, (TOUJEO SOLOSTAR U-300 INSULIN) 300 unit/mL (1.5 mL) InPn pen Inject 26 Units into the skin once daily.   1/9/2025    iron-vitamin C 100-250 mg, ICAR-C, 100-250 mg " Tab Take 1 tablet by mouth once daily.   1/9/2025    levothyroxine (SYNTHROID) 100 MCG tablet Take 100 mcg by mouth before breakfast.   1/9/2025    meclizine (ANTIVERT) 25 mg tablet Take 25 mg by mouth 3 (three) times daily as needed.   Taking As Needed    pantoprazole (PROTONIX) 20 MG tablet Take 20 mg by mouth once daily.   1/9/2025    traZODone (DESYREL) 100 MG tablet Take 100 mg by mouth every evening.   1/9/2025    [DISCONTINUED] atorvastatin (LIPITOR) 20 MG tablet Take 1 tablet (20 mg total) by mouth every evening.   Past Week    cyclobenzaprine (FLEXERIL) 5 MG tablet Take 5 mg by mouth daily as needed.   Unknown    LIDOcaine (LIDODERM) 5 % Place 1 patch onto the skin once daily. Remove & Discard patch within 12 hours or as directed by MD  0 Unknown    losartan (COZAAR) 100 MG tablet Take 1 tablet (100 mg total) by mouth once daily.   Unknown    metFORMIN (GLUCOPHAGE) 500 MG tablet Take 500 mg by mouth once daily.   Unknown    sucralfate (CARAFATE) 1 gram tablet Take 1 g by mouth Daily.   Unknown       \                  .

## 2025-01-11 NOTE — HOSPITAL COURSE
Emely Bush is a 79 year old female who was admitted for closed nondisplaced intertrochanteric fracture of right femur. She underwent right femur intramedullary alok insertion on 1/11/25 by Dr. Mclean. PT/TO has been ordered postoperatively.     1/12/25  Patient is s/p femur alok insertion. Seen by PT/TO.    Patient is still requiring significant help to merely get out of bed.  Hemoglobin down to 7.6 transfused 1 unit of packed blood cells with improvement in hemoglobin to 9.7.  Iron saturation at 7% we will need p.o. iron.  Glucose under fair control.  Patient seen and examined on day of discharge and stable for discharge to skilled nursing.  She is weight-bearing as tolerated to her right lower extremity.  She is to use rolling walker for ambulation.  DVT prophylaxis of Lovenox until seen in follow up by ortho Trauma in 3 weeks.  She is to change dressing as needed and leave incisions covered until followed up with clinic.

## 2025-01-11 NOTE — ANESTHESIA PREPROCEDURE EVALUATION
01/11/2025  Emely Bush is a 79 y.o., female w/ dementia, DM, HTN, and CAD.  Presented to the ED for evaluation of R hip pain and swelling which onset after falling this morning when she got up to use the restroom. Denies hitting her head or loss of consciousness.     Patient Active Problem List   Diagnosis    Controlled type 2 diabetes with neuropathy    Chronic systolic heart failure    Coronary artery disease due to calcified coronary lesion    Hx-TIA (transient ischemic attack)    Hyperlipidemia associated with type 2 diabetes mellitus    Hypertension associated with diabetes    Hypothyroidism    Ischemic cardiomyopathy    Insomnia    SHEREEN on CPAP    Other cataract    Chronic low back pain    Breast lump or mass    Closed displaced intertrochanteric fracture of left femur with routine healing    Alcohol abuse, daily use    Foot pain, left    Osteopenia after menopause    Primary hypertension    Closed intertrochanteric fracture of right femur, initial encounter     Past Medical History:   Diagnosis Date    Coronary artery disease     Diabetes mellitus     Hypertension      Past Surgical History:   Procedure Laterality Date    APPENDECTOMY      CARDIAC SURGERY      CHOLECYSTECTOMY      CORONARY ARTERY BYPASS GRAFT      HYSTERECTOMY      INTRAMEDULLARY RODDING OF FEMUR Left 4/25/2021    Procedure: INSERTION, INTREMEDULLARY FÁTIMA, FEMUR;  Surgeon: An Mart MD;  [facility]: [facility];  Service: Orthopedics;  Laterality: Left;  Left hip cephalomedullarly nail, or spinal/sifi block    THYROID SURGERY  1995       Chemistry        Component Value Date/Time     01/11/2025 0659    K 4.0 01/11/2025 0659     01/11/2025 0659    CO2 29 01/11/2025 0659    BUN 17 01/11/2025 0659    CREATININE 0.7 01/11/2025 0659     (H) 01/11/2025 0659        Component Value Date/Time    CALCIUM 8.4 (L)  01/11/2025 0659    ALKPHOS 165 (H) 01/11/2025 0659     (H) 01/11/2025 0659     (H) 01/11/2025 0659    BILITOT 1.1 (H) 01/11/2025 0659    ESTGFRAFRICA >60 04/27/2021 0916    EGFRNONAA >60 04/27/2021 0916        Lab Results   Component Value Date    WBC 5.95 01/11/2025    HGB 10.4 (L) 01/11/2025    HCT 33.0 (L) 01/11/2025    MCV 92 01/11/2025     01/11/2025     ECHO (2021):  Summary    Concentric hypertrophy and normal systolic function.  The estimated ejection fraction is 55%.  Indeterminate left ventricular diastolic function.  With normal right ventricular systolic function.  Moderate left atrial enlargement.    Pre-op Assessment    I have reviewed the Patient Summary Reports.    I have reviewed the NPO Status.   I have reviewed the Medications.     Review of Systems  Anesthesia Hx:  No problems with previous Anesthesia   History of prior surgery of interest to airway management or planning:  Previous anesthesia: General, Spinal         Personal Hx of Anesthesia complications, Post-Operative Nausea/Vomiting, with every anesthetic, treatment not known                    Social:  Non-Smoker, Alcohol Use Nightly glass of wine       Hematology/Oncology:  Hematology Normal   Oncology Normal                                   Cardiovascular:     Hypertension   CAD   CABG/stent           ECG has been reviewed.                            Pulmonary:        Sleep Apnea, CPAP                Renal/:  Renal/ Normal                 Musculoskeletal:  Musculoskeletal Normal                Neurological:              pleasant and oriented to situation; answers questions appropriately; mildly confused        Dementia mild                        Endocrine:  Diabetes, well controlled, type 2 Hypothyroidism  Bmi 25            Physical Exam  General: Cooperative, Alert, Oriented and Cachexia  Frail appearing; pleasant and oriented to situation; answers questions appropriately; mildly confused     Airway:  Mallampati: III   Mouth Opening: Small, but > 3cm  TM Distance: 4 - 6 cm  Tongue: Normal  Neck ROM: Normal ROM    Dental:        Anesthesia Plan  Type of Anesthesia, risks & benefits discussed:    Anesthesia Type: Gen ETT  Intra-op Monitoring Plan: Standard ASA Monitors  Post Op Pain Control Plan: multimodal analgesia and IV/PO Opioids PRN  Induction:  IV  Airway Plan: Direct, Post-Induction  Informed Consent: Informed consent signed with the Patient and all parties understand the risks and agree with anesthesia plan.  All questions answered.   ASA Score: 3  Day of Surgery Review of History & Physical: H&P Update referred to the surgeon/provider.    Ready For Surgery From Anesthesia Perspective.     .

## 2025-01-11 NOTE — PLAN OF CARE
[facility]  Initial Discharge Assessment       Primary Care Provider: Dara Talley MD    Admission Diagnosis: Fall [W19.XXXA]  Preop cardiovascular exam [Z01.810]  History of diabetes mellitus, type II [Z86.39]  Chest pain [R07.9]  Intertrochanteric fracture of right hip, closed, initial encounter [S72.141A]  History of dementia [Z86.59]  Closed comminuted intertrochanteric fracture of right femur, initial encounter [S72.141A]    Admission Date: 1/10/2025  Expected Discharge Date:     Transition of Care Barriers: None    Payor: [employer] [facility] / Plan: [employer] CHOICES / Product Type: Medicare Advantage /     Extended Emergency Contact Information  Primary Emergency Contact: COLIN ZENDEJAS  Mobile Phone: [phone]  Relation: Spouse  Preferred language: English   needed? No    Discharge Plan A: Home with family  Discharge Plan B: Home Health, Rehab, Skilled Nursing Facility      [facility] - [place] - [address] [place] [address]  [address] [place] [address]  [place] [address]  Phone: [phone] Fax: [phone]      Initial Assessment (most recent)       Adult Discharge Assessment - 01/11/25 1145          Discharge Assessment    Assessment Type Discharge Planning Assessment     Confirmed/corrected address, phone number and insurance Yes     Confirmed Demographics Correct on Facesheet     Source of Information family     When was your last doctors appointment? --   1-2 months ago    Does patient/caregiver understand observation status Yes     Reason For Admission Fall with swelling     People in Home spouse     Do you expect to return to your current living situation? Yes     Do you have help at home or someone to help you manage your care at home? Yes     Who are your caregiver(s) and their phone number(s)? Colin Zendejas, spouse [phone]     Prior to hospitilization cognitive status: Alert/Oriented     Current cognitive status: Unable to Assess     Walking or Climbing  Stairs Difficulty no     Dressing/Bathing Difficulty no     Equipment Currently Used at Home none     Readmission within 30 days? No     Patient currently being followed by outpatient case management? No     Do you currently have service(s) that help you manage your care at home? No     Do you take prescription medications? Yes     Do you have prescription coverage? Yes     [facility]     Do you have any problems affording any of your prescribed medications? No     Is the patient taking medications as prescribed? yes     Who is going to help you get home at discharge? Colin Leesburg, spouse [phone]     Are you on dialysis? No     Do you take coumadin? No     Discharge Plan A Home with family     Discharge Plan B Home Health;Rehab;Skilled Nursing Facility     DME Needed Upon Discharge  none     Discharge Plan discussed with: Spouse/sig other     Name(s) and Number(s) Colin Eelazar, spouse [phone]     Transition of Care Barriers None        OTHER    Name(s) of People in Home Colinoswald MarquezLeesburg, spouse [phone]

## 2025-01-11 NOTE — ANESTHESIA POSTPROCEDURE EVALUATION
Anesthesia Post Evaluation    Patient: Emely Bush    Procedure(s) Performed: Procedure(s) (LRB):  INSERTION, INTRAMEDULLARY FÁTIMA, FEMUR, PROXIMAL (Right)    Final Anesthesia Type: general      Patient location during evaluation: PACU  Patient participation: Yes- Able to Participate  Level of consciousness: awake and alert and oriented  Post-procedure vital signs: reviewed and stable  Pain management: adequate  Airway patency: patent  SHEREEN mitigation strategies: Verification of full reversal of neuromuscular block  PONV status at discharge: No PONV  Anesthetic complications: no      Cardiovascular status: blood pressure returned to baseline and hemodynamically stable  Respiratory status: unassisted  Hydration status: euvolemic  Follow-up not needed.              Vitals Value Taken Time   /55 01/11/25 1559   Temp 36.4 °C (97.6 °F) 01/11/25 1559   Pulse 57 01/11/25 1559   Resp 16 01/11/25 1559   SpO2 97 % 01/11/25 1559         Event Time   Out of Recovery 01/11/2025 15:40:00         Pain/Scot Score: Pain Rating Prior to Med Admin: 9 (1/11/2025 12:06 PM)  Pain Rating Post Med Admin: 8 (1/11/2025 10:33 AM)  Scot Score: 9 (1/11/2025  3:30 PM)

## 2025-01-11 NOTE — TRANSFER OF CARE
"Anesthesia Transfer of Care Note    Patient: Emely Bush    Procedure(s) Performed: Procedure(s) (LRB):  INSERTION, INTRAMEDULLARY FÁTIMA, FEMUR, PROXIMAL (Right)    Patient location: PACU    Anesthesia Type: general    Transport from OR: Transported from OR on room air with adequate spontaneous ventilation    Post pain: adequate analgesia    Post assessment: no apparent anesthetic complications    Post vital signs: stable    Level of consciousness: sedated    Nausea/Vomiting: no nausea/vomiting    Complications: none    Transfer of care protocol was followed      Last vitals: Visit Vitals  BP (!) 111/52 (BP Location: Right arm, Patient Position: Lying)   Pulse (!) 59   Temp 36.7 °C (98 °F) (Skin)   Resp 16   Ht 5' 2" (1.575 m)   Wt 61.2 kg (135 lb)   SpO2 99%   BMI 24.69 kg/m²     "

## 2025-01-11 NOTE — ANESTHESIA PROCEDURE NOTES
Intubation    Date/Time: 1/11/2025 1:51 PM    Performed by: Raven Kimbrough CRNA  Authorized by: Farooq Partida MD    Intubation:     Induction:  Intravenous    Intubated:  Postinduction    Mask Ventilation:  Easy mask    Attempts:  1    Attempted By:  CRNA    Method of Intubation:  Direct    Blade:  Dread 3    Laryngeal View Grade: Grade I - full view of cords      Difficult Airway Encountered?: No      Complications:  None    Airway Device:  Oral endotracheal tube    Airway Device Size:  7.5    Style/Cuff Inflation:  Cuffed (inflated to minimal occlusive pressure)    Tube secured:  21    Secured at:  The lips    Placement Verified By:  Capnometry and Revisualization with laryngoscopy    Complicating Factors:  None    Findings Post-Intubation:  BS equal bilateral and atraumatic/condition of teeth unchanged

## 2025-01-11 NOTE — SUBJECTIVE & OBJECTIVE
Interval History: Plans for surgery this afternoon. She complains of pain with movement. Orders placed for PT/TO postoperatively    Review of Systems   Constitutional:  Positive for activity change, appetite change and fatigue.   Respiratory:  Negative for shortness of breath.    Cardiovascular:  Positive for leg swelling (Right upper thigh). Negative for chest pain.   Gastrointestinal:  Negative for abdominal distention, constipation, nausea and vomiting.   Musculoskeletal:  Positive for arthralgias, gait problem, joint swelling and myalgias.   Skin:  Positive for color change.   Neurological:  Positive for weakness.   Psychiatric/Behavioral:  Negative for agitation.    All other systems reviewed and are negative.    Objective:     Vital Signs (Most Recent):  Temp: 98 °F (36.7 °C) (01/11/25 1505)  Pulse: 60 (01/11/25 1530)  Resp: 12 (01/11/25 1515)  BP: (!) 122/57 (01/11/25 1530)  SpO2: (!) 92 % (01/11/25 1530) Vital Signs (24h Range):  Temp:  [98 °F (36.7 °C)-98.6 °F (37 °C)] 98 °F (36.7 °C)  Pulse:  [54-78] 60  Resp:  [12-20] 12  SpO2:  [92 %-100 %] 92 %  BP: (105-189)/(52-77) 122/57     Weight: 61.2 kg (135 lb)  Body mass index is 24.69 kg/m².    Intake/Output Summary (Last 24 hours) at 1/11/2025 1558  Last data filed at 1/11/2025 1530  Gross per 24 hour   Intake 1090 ml   Output 1200 ml   Net -110 ml         Physical Exam  Vitals and nursing note reviewed.   Constitutional:       General: She is not in acute distress.     Appearance: She is normal weight. She is ill-appearing.   HENT:      Head: Normocephalic and atraumatic.      Right Ear: Hearing and external ear normal.      Left Ear: Hearing and external ear normal.      Nose: No rhinorrhea.      Right Sinus: No maxillary sinus tenderness or frontal sinus tenderness.      Left Sinus: No maxillary sinus tenderness or frontal sinus tenderness.      Mouth/Throat:      Mouth: No oral lesions.      Pharynx: Uvula midline.   Eyes:      General:         Right eye:  No discharge.         Left eye: No discharge.      Conjunctiva/sclera: Conjunctivae normal.      Pupils: Pupils are equal, round, and reactive to light.   Neck:      Thyroid: No thyromegaly.      Vascular: No carotid bruit.      Trachea: No tracheal deviation.   Cardiovascular:      Rate and Rhythm: Normal rate and regular rhythm.      Pulses:           Dorsalis pedis pulses are 2+ on the right side and 2+ on the left side.      Heart sounds: S1 normal and S2 normal. Murmur heard.   Pulmonary:      Effort: Pulmonary effort is normal. No respiratory distress.      Breath sounds: Normal breath sounds.   Abdominal:      General: Bowel sounds are decreased. There is distension.      Palpations: Abdomen is soft. There is no mass.      Tenderness: There is no abdominal tenderness.   Musculoskeletal:         General: Normal range of motion.      Cervical back: Normal range of motion.      Right upper leg: Swelling and deformity present.        Legs:       Comments: NVI intact on right   Lymphadenopathy:      Cervical: No cervical adenopathy.      Upper Body:      Right upper body: No supraclavicular adenopathy.      Left upper body: No supraclavicular adenopathy.   Skin:     General: Skin is warm and dry.      Capillary Refill: Capillary refill takes less than 2 seconds.      Findings: No rash.   Neurological:      Mental Status: Mental status is at baseline.   Psychiatric:         Behavior: Behavior is slowed.           Significant Labs: All pertinent labs within the past 24 hours have been reviewed.  CBC:   Recent Labs   Lab 01/10/25  1608 01/11/25  0659   WBC 9.89 5.95   HGB 12.0 10.4*   HCT 36.6* 33.0*    176     CMP:   Recent Labs   Lab 01/10/25  1608 01/11/25  0659    140   K 4.5 4.0    105   CO2 22* 29   * 117*   BUN 12 17   CREATININE 0.7 0.7   CALCIUM 8.8 8.4*   PROT 6.5 5.3*   ALBUMIN 3.5 2.9*   BILITOT 1.0 1.1*   ALKPHOS 77 165*   AST 23 424*   ALT 21 313*   ANIONGAP 13 6*        Significant Imaging: I have reviewed all pertinent imaging results/findings within the past 24 hours.

## 2025-01-11 NOTE — PLAN OF CARE
Discussed poc with pt, pt verbalized understanding    Purposeful rounding every 2hours    VS wnl    Blood glucose monitoring   Fall precautions in place, remains injury free  Pt c/o pain  Pain under control with PRN meds    IVFs saline locked  Accurate I&Os    Bed locked at lowest position  Call light within reach    Chart check complete  Will cont with POC

## 2025-01-12 LAB
ALBUMIN SERPL BCP-MCNC: 2.5 G/DL (ref 3.5–5.2)
ALP SERPL-CCNC: 119 U/L (ref 40–150)
ALT SERPL W/O P-5'-P-CCNC: 137 U/L (ref 10–44)
ANION GAP SERPL CALC-SCNC: 7 MMOL/L (ref 8–16)
AST SERPL-CCNC: 79 U/L (ref 10–40)
BASOPHILS # BLD AUTO: 0.01 K/UL (ref 0–0.2)
BASOPHILS NFR BLD: 0.1 % (ref 0–1.9)
BILIRUB SERPL-MCNC: 0.4 MG/DL (ref 0.1–1)
BUN SERPL-MCNC: 17 MG/DL (ref 8–23)
CALCIUM SERPL-MCNC: 7.9 MG/DL (ref 8.7–10.5)
CHLORIDE SERPL-SCNC: 106 MMOL/L (ref 95–110)
CO2 SERPL-SCNC: 24 MMOL/L (ref 23–29)
CREAT SERPL-MCNC: 0.8 MG/DL (ref 0.5–1.4)
DIFFERENTIAL METHOD BLD: ABNORMAL
EOSINOPHIL # BLD AUTO: 0 K/UL (ref 0–0.5)
EOSINOPHIL NFR BLD: 0 % (ref 0–8)
ERYTHROCYTE [DISTWIDTH] IN BLOOD BY AUTOMATED COUNT: 13.1 % (ref 11.5–14.5)
EST. GFR  (NO RACE VARIABLE): >60 ML/MIN/1.73 M^2
GLUCOSE SERPL-MCNC: 224 MG/DL (ref 70–110)
HCT VFR BLD AUTO: 27.8 % (ref 37–48.5)
HGB BLD-MCNC: 8.6 G/DL (ref 12–16)
IMM GRANULOCYTES # BLD AUTO: 0.06 K/UL (ref 0–0.04)
IMM GRANULOCYTES NFR BLD AUTO: 0.6 % (ref 0–0.5)
LYMPHOCYTES # BLD AUTO: 0.9 K/UL (ref 1–4.8)
LYMPHOCYTES NFR BLD: 8.7 % (ref 18–48)
MAGNESIUM SERPL-MCNC: 1.8 MG/DL (ref 1.6–2.6)
MCH RBC QN AUTO: 28.3 PG (ref 27–31)
MCHC RBC AUTO-ENTMCNC: 30.9 G/DL (ref 32–36)
MCV RBC AUTO: 91 FL (ref 82–98)
MONOCYTES # BLD AUTO: 0.6 K/UL (ref 0.3–1)
MONOCYTES NFR BLD: 5.8 % (ref 4–15)
NEUTROPHILS # BLD AUTO: 8.9 K/UL (ref 1.8–7.7)
NEUTROPHILS NFR BLD: 84.8 % (ref 38–73)
NRBC BLD-RTO: 0 /100 WBC
PHOSPHATE SERPL-MCNC: 3.4 MG/DL (ref 2.7–4.5)
PLATELET # BLD AUTO: 159 K/UL (ref 150–450)
PMV BLD AUTO: 10.1 FL (ref 9.2–12.9)
POCT GLUCOSE: 247 MG/DL (ref 70–110)
POCT GLUCOSE: 275 MG/DL (ref 70–110)
POCT GLUCOSE: 277 MG/DL (ref 70–110)
POCT GLUCOSE: 329 MG/DL (ref 70–110)
POTASSIUM SERPL-SCNC: 4 MMOL/L (ref 3.5–5.1)
PROT SERPL-MCNC: 5 G/DL (ref 6–8.4)
RBC # BLD AUTO: 3.04 M/UL (ref 4–5.4)
SODIUM SERPL-SCNC: 137 MMOL/L (ref 136–145)
WBC # BLD AUTO: 10.47 K/UL (ref 3.9–12.7)

## 2025-01-12 PROCEDURE — 63600175 PHARM REV CODE 636 W HCPCS: Performed by: ORTHOPAEDIC SURGERY

## 2025-01-12 PROCEDURE — 80053 COMPREHEN METABOLIC PANEL: CPT | Performed by: ORTHOPAEDIC SURGERY

## 2025-01-12 PROCEDURE — 97166 OT EVAL MOD COMPLEX 45 MIN: CPT

## 2025-01-12 PROCEDURE — 25000003 PHARM REV CODE 250: Performed by: FAMILY MEDICINE

## 2025-01-12 PROCEDURE — 63600175 PHARM REV CODE 636 W HCPCS: Performed by: NURSE PRACTITIONER

## 2025-01-12 PROCEDURE — 97530 THERAPEUTIC ACTIVITIES: CPT

## 2025-01-12 PROCEDURE — 25000003 PHARM REV CODE 250: Performed by: ORTHOPAEDIC SURGERY

## 2025-01-12 PROCEDURE — 99024 POSTOP FOLLOW-UP VISIT: CPT | Mod: ,,, | Performed by: PHYSICIAN ASSISTANT

## 2025-01-12 PROCEDURE — 99900035 HC TECH TIME PER 15 MIN (STAT)

## 2025-01-12 PROCEDURE — 25000003 PHARM REV CODE 250: Performed by: HOSPITALIST

## 2025-01-12 PROCEDURE — 97162 PT EVAL MOD COMPLEX 30 MIN: CPT

## 2025-01-12 PROCEDURE — 83735 ASSAY OF MAGNESIUM: CPT | Performed by: ORTHOPAEDIC SURGERY

## 2025-01-12 PROCEDURE — 36415 COLL VENOUS BLD VENIPUNCTURE: CPT | Performed by: ORTHOPAEDIC SURGERY

## 2025-01-12 PROCEDURE — 85025 COMPLETE CBC W/AUTO DIFF WBC: CPT | Performed by: ORTHOPAEDIC SURGERY

## 2025-01-12 PROCEDURE — 11000001 HC ACUTE MED/SURG PRIVATE ROOM

## 2025-01-12 PROCEDURE — 84100 ASSAY OF PHOSPHORUS: CPT | Performed by: ORTHOPAEDIC SURGERY

## 2025-01-12 RX ORDER — INSULIN GLARGINE 100 [IU]/ML
10 INJECTION, SOLUTION SUBCUTANEOUS NIGHTLY
Status: DISCONTINUED | OUTPATIENT
Start: 2025-01-12 | End: 2025-01-15 | Stop reason: HOSPADM

## 2025-01-12 RX ADMIN — AMLODIPINE BESYLATE 10 MG: 10 TABLET ORAL at 08:01

## 2025-01-12 RX ADMIN — HYDROCODONE BITARTRATE AND ACETAMINOPHEN 1 TABLET: 5; 325 TABLET ORAL at 09:01

## 2025-01-12 RX ADMIN — ACETAMINOPHEN 650 MG: 325 TABLET ORAL at 09:01

## 2025-01-12 RX ADMIN — DONEPEZIL HYDROCHLORIDE 10 MG: 5 TABLET, FILM COATED ORAL at 09:01

## 2025-01-12 RX ADMIN — SENNOSIDES AND DOCUSATE SODIUM 1 TABLET: 50; 8.6 TABLET ORAL at 08:01

## 2025-01-12 RX ADMIN — CHLORHEXIDINE GLUCONATE 0.12% ORAL RINSE 10 ML: 1.2 LIQUID ORAL at 09:01

## 2025-01-12 RX ADMIN — PANTOPRAZOLE SODIUM 40 MG: 40 TABLET, DELAYED RELEASE ORAL at 08:01

## 2025-01-12 RX ADMIN — MELATONIN TAB 3 MG 6 MG: 3 TAB at 09:01

## 2025-01-12 RX ADMIN — CARVEDILOL 25 MG: 12.5 TABLET, FILM COATED ORAL at 09:01

## 2025-01-12 RX ADMIN — LEVOTHYROXINE SODIUM 100 MCG: 100 TABLET ORAL at 05:01

## 2025-01-12 RX ADMIN — INSULIN ASPART 2 UNITS: 100 INJECTION, SOLUTION INTRAVENOUS; SUBCUTANEOUS at 09:01

## 2025-01-12 RX ADMIN — CEFAZOLIN 2 G: 2 INJECTION, POWDER, FOR SOLUTION INTRAMUSCULAR; INTRAVENOUS at 05:01

## 2025-01-12 RX ADMIN — POLYETHYLENE GLYCOL 3350 17 G: 17 POWDER, FOR SOLUTION ORAL at 08:01

## 2025-01-12 RX ADMIN — Medication 100 MG: at 08:01

## 2025-01-12 RX ADMIN — SODIUM CHLORIDE: 9 INJECTION, SOLUTION INTRAVENOUS at 03:01

## 2025-01-12 RX ADMIN — CARVEDILOL 25 MG: 12.5 TABLET, FILM COATED ORAL at 08:01

## 2025-01-12 RX ADMIN — INSULIN GLARGINE 10 UNITS: 100 INJECTION, SOLUTION SUBCUTANEOUS at 09:01

## 2025-01-12 RX ADMIN — SENNOSIDES AND DOCUSATE SODIUM 1 TABLET: 50; 8.6 TABLET ORAL at 09:01

## 2025-01-12 RX ADMIN — ATORVASTATIN CALCIUM 20 MG: 10 TABLET, FILM COATED ORAL at 09:01

## 2025-01-12 RX ADMIN — FLUOXETINE HYDROCHLORIDE 40 MG: 20 CAPSULE ORAL at 08:01

## 2025-01-12 RX ADMIN — INSULIN ASPART 8 UNITS: 100 INJECTION, SOLUTION INTRAVENOUS; SUBCUTANEOUS at 04:01

## 2025-01-12 RX ADMIN — INSULIN ASPART 6 UNITS: 100 INJECTION, SOLUTION INTRAVENOUS; SUBCUTANEOUS at 05:01

## 2025-01-12 RX ADMIN — CHLORHEXIDINE GLUCONATE 0.12% ORAL RINSE 10 ML: 1.2 LIQUID ORAL at 08:01

## 2025-01-12 RX ADMIN — INSULIN ASPART 6 UNITS: 100 INJECTION, SOLUTION INTRAVENOUS; SUBCUTANEOUS at 11:01

## 2025-01-12 NOTE — PLAN OF CARE
OT sarah completed. Sup>sit min A, sit>stand min A, functional mobility x20ft x2 trials with RW and min A (progressing to CGA), step>pivot to bedside chair with RW and CGA. Recommending low intensity intervention at d/c.

## 2025-01-12 NOTE — ASSESSMENT & PLAN NOTE
"  Patient has Systolic (HFrEF) heart failure that is Chronic. On presentation their CHF was well compensated. Most recent BNP and echo results are listed below.  No results for input(s): "BNP" in the last 72 hours.  Latest ECHO  Results for orders placed during the hospital encounter of 04/24/21    Echo Color Flow Doppler? Yes    Interpretation Summary  · Concentric hypertrophy and normal systolic function.  · The estimated ejection fraction is 55%.  · Indeterminate left ventricular diastolic function.  · With normal right ventricular systolic function.  · Moderate left atrial enlargement.    Current Heart Failure Medications  hydrALAZINE injection 10 mg, Every 6 hours PRN, Intravenous  carvediloL tablet 25 mg, 2 times daily, Oral    Plan  - Monitor strict I&Os and daily weights.    - Place on telemetry  - Low sodium diet  - Place on fluid restriction of 1.5 L.   - Cardiology has not been consulted  - The patient's volume status is at their baseline         "

## 2025-01-12 NOTE — ASSESSMENT & PLAN NOTE
Patient's FSGs are controlled on current medication regimen.  Last A1c reviewed-   Lab Results   Component Value Date    HGBA1C 7.3 (H) 04/24/2021     Most recent fingerstick glucose reviewed-   Recent Labs   Lab 01/11/25  1721 01/11/25  2115 01/12/25  0515 01/12/25  1150   POCTGLUCOSE 158* 315* 275* 277*       Current correctional scale  Medium  Maintain anti-hyperglycemic dose as follows-   Antihyperglycemics (From admission, onward)      Start     Stop Route Frequency Ordered    01/12/25 2100  insulin glargine U-100 (Lantus) pen 10 Units         -- SubQ Nightly 01/12/25 1441    01/10/25 1749  insulin aspart U-100 pen 0-10 Units         -- SubQ Before meals & nightly PRN 01/10/25 1650          Hold Oral hypoglycemics while patient is in the hospital.    1/12/25  Slightly elevated. She takes Toujeo daily. Will start Lantus 10 units nightly

## 2025-01-12 NOTE — ASSESSMENT & PLAN NOTE
Patient's blood pressure range in the last 24 hours was: BP  Min: 108/54  Max: 144/64.The patient's inpatient anti-hypertensive regimen is listed below:  Current Antihypertensives  , , Oral  hydrALAZINE injection 10 mg, Every 6 hours PRN, Intravenous  amLODIPine tablet 10 mg, Daily, Oral  carvediloL tablet 25 mg, 2 times daily, Oral    Plan  - BP is controlled, no changes needed to their regimen

## 2025-01-12 NOTE — PROGRESS NOTES
[facility] Medicine  Progress Note    Patient Name: Emely Bush  MRN: [id number]  Patient Class: IP- Inpatient   Admission Date: 1/10/2025  Length of Stay: 2 days  Attending Physician: Elizabeth Small MD  Primary Care Provider: Dara Talley MD      Subjective     Principal Problem:Closed intertrochanteric fracture of right femur, initial encounter      HPI:  79 y.o. female, comorbid conditions include dementia, DM, HTN, and CAD.  Presented to the ED for evaluation of R hip pain and swelling which onset after falling this morning when she got up to use the restroom. Denies hitting her head or loss of consciousness. Associated sxs include R leg pain. Patient denies any fever, HA, n/v, and all other sxs at this time.  states that she normally ambulates well without assistance, but has recently been dx with early dementia and has had some trouble getting around. Takes ASA 81mg daily. In the ED, vitals: 137/56, 67, 18, 98.1F, 99% RA on arrival. Significant labs: Glu: 168. Xray Femur: Comminuted, displaced and angulated right intertrochanteric fracture. Orthopedic consulted in ED. Treated with IV fluids, pain medication, anti-emetic. Patient is a full code. Admitted to Hospital Medicine for management of Right Hip Fracture.     Overview/Hospital Course:  Emely Bush is a 79 year old female who was admitted for closed nondisplaced intertrochanteric fracture of right femur. She underwent right femur intramedullary alok insertion on 1/11/25 by Dr. Mclean. PT/TO has been ordered postoperatively.     1/12/25  Patient is s/p femur alok insertion. Seen by PT/TO who recommended low intensity therapy.     Interval History: sitting in chair for lunch. Required assistance x 2 to get back to bed. Encouraged sitting up in chair with meds to increase activity.     Review of Systems   Constitutional:  Positive for activity change, appetite change and fatigue.   Respiratory:  Negative for  shortness of breath.    Cardiovascular:  Negative for chest pain and leg swelling.   Gastrointestinal:  Negative for abdominal distention, constipation, nausea and vomiting.   Musculoskeletal:  Positive for arthralgias, gait problem, joint swelling and myalgias.   Skin:  Negative for color change.   Neurological:  Positive for weakness.   Psychiatric/Behavioral:  Negative for agitation.    All other systems reviewed and are negative.    Objective:     Vital Signs (Most Recent):  Temp: 97.3 °F (36.3 °C) (01/12/25 1232)  Pulse: 61 (01/12/25 1232)  Resp: 18 (01/12/25 1232)  BP: (!) 108/54 (01/12/25 1232)  SpO2: 95 % (01/12/25 1232) Vital Signs (24h Range):  Temp:  [97.3 °F (36.3 °C)-98.2 °F (36.8 °C)] 97.3 °F (36.3 °C)  Pulse:  [57-64] 61  Resp:  [12-18] 18  SpO2:  [92 %-100 %] 95 %  BP: (108-144)/(52-78) 108/54     Weight: 61.2 kg (135 lb)  Body mass index is 24.69 kg/m².    Intake/Output Summary (Last 24 hours) at 1/12/2025 1424  Last data filed at 1/12/2025 0357  Gross per 24 hour   Intake 1044.1 ml   Output 600 ml   Net 444.1 ml         Physical Exam  Vitals and nursing note reviewed.   Constitutional:       General: She is not in acute distress.     Appearance: She is normal weight. She is ill-appearing.   HENT:      Head: Normocephalic and atraumatic.      Right Ear: Hearing and external ear normal.      Left Ear: Hearing and external ear normal.      Nose: No rhinorrhea.      Right Sinus: No maxillary sinus tenderness or frontal sinus tenderness.      Left Sinus: No maxillary sinus tenderness or frontal sinus tenderness.      Mouth/Throat:      Mouth: No oral lesions.      Pharynx: Uvula midline.   Eyes:      General:         Right eye: No discharge.         Left eye: No discharge.      Conjunctiva/sclera: Conjunctivae normal.      Pupils: Pupils are equal, round, and reactive to light.   Neck:      Thyroid: No thyromegaly.      Vascular: No carotid bruit.      Trachea: No tracheal deviation.   Cardiovascular:       Rate and Rhythm: Normal rate and regular rhythm.      Pulses:           Dorsalis pedis pulses are 2+ on the right side and 2+ on the left side.      Heart sounds: S1 normal and S2 normal. Murmur heard.   Pulmonary:      Effort: Pulmonary effort is normal. No respiratory distress.      Breath sounds: Normal breath sounds.   Abdominal:      General: Bowel sounds are decreased. There is distension.      Palpations: Abdomen is soft. There is no mass.      Tenderness: There is no abdominal tenderness.   Musculoskeletal:         General: Normal range of motion.      Cervical back: Normal range of motion.      Right upper leg: No swelling or deformity.      Comments: NVI intact on right   Lymphadenopathy:      Cervical: No cervical adenopathy.      Upper Body:      Right upper body: No supraclavicular adenopathy.      Left upper body: No supraclavicular adenopathy.   Skin:     General: Skin is warm and dry.      Capillary Refill: Capillary refill takes less than 2 seconds.      Findings: No rash.      Comments: Right hip dressing in place. Dried serosanguinous drainage noted.   Neurological:      Mental Status: Mental status is at baseline.   Psychiatric:         Behavior: Behavior is slowed.             Significant Labs: All pertinent labs within the past 24 hours have been reviewed.  CBC:   Recent Labs   Lab 01/10/25  1608 01/11/25  0659 01/12/25  0713   WBC 9.89 5.95 10.47   HGB 12.0 10.4* 8.6*   HCT 36.6* 33.0* 27.8*    176 159     CMP:   Recent Labs   Lab 01/10/25  1608 01/11/25  0659 01/12/25  0712    140 137   K 4.5 4.0 4.0    105 106   CO2 22* 29 24   * 117* 224*   BUN 12 17 17   CREATININE 0.7 0.7 0.8   CALCIUM 8.8 8.4* 7.9*   PROT 6.5 5.3* 5.0*   ALBUMIN 3.5 2.9* 2.5*   BILITOT 1.0 1.1* 0.4   ALKPHOS 77 165* 119   AST 23 424* 79*   ALT 21 313* 137*   ANIONGAP 13 6* 7*       Significant Imaging: I have reviewed all pertinent imaging results/findings within the past 24  "hours.    Assessment and Plan     * Closed intertrochanteric fracture of right femur, initial encounter   Xray: Comminuted, displaced and angulated right intertrochanteric fracture. Mid right thigh swelling. Pedal pulse: 2+ on initial evaluation    --Monitor NVI of right leg q4h  --Serial H/H  --Type and screen  --tele  --vitals per protocol  --NPO after midnight  --Orthopedic surgery consulted, planned for surgery in AM    1/11/25  Plans for right femur intramedullary alok insertion today.   Will order PT/TO postoperatively    1/12/25  Hemoglobin down almost 2 grams POD#1. Repeat in AM. May require transfusion  PT/OT recommends HH with 24 supervision. Continue therapy and pain management  Hold DVT prophylaxis today. Can start tomorrow if no abnormal bleeding      Primary hypertension  Patient's blood pressure range in the last 24 hours was: BP  Min: 108/54  Max: 144/64.The patient's inpatient anti-hypertensive regimen is listed below:  Current Antihypertensives  , , Oral  hydrALAZINE injection 10 mg, Every 6 hours PRN, Intravenous  amLODIPine tablet 10 mg, Daily, Oral  carvediloL tablet 25 mg, 2 times daily, Oral    Plan  - BP is controlled, no changes needed to their regimen      Osteopenia after menopause  Managed with biphosphonates      Hypothyroidism  --cont home med      Chronic systolic heart failure    Patient has Systolic (HFrEF) heart failure that is Chronic. On presentation their CHF was well compensated. Most recent BNP and echo results are listed below.  No results for input(s): "BNP" in the last 72 hours.  Latest ECHO  Results for orders placed during the hospital encounter of 04/24/21    Echo Color Flow Doppler? Yes    Interpretation Summary  · Concentric hypertrophy and normal systolic function.  · The estimated ejection fraction is 55%.  · Indeterminate left ventricular diastolic function.  · With normal right ventricular systolic function.  · Moderate left atrial enlargement.    Current Heart " Failure Medications  hydrALAZINE injection 10 mg, Every 6 hours PRN, Intravenous  carvediloL tablet 25 mg, 2 times daily, Oral    Plan  - Monitor strict I&Os and daily weights.    - Place on telemetry  - Low sodium diet  - Place on fluid restriction of 1.5 L.   - Cardiology has not been consulted  - The patient's volume status is at their baseline           Controlled type 2 diabetes with neuropathy  Patient's FSGs are controlled on current medication regimen.  Last A1c reviewed-   Lab Results   Component Value Date    HGBA1C 7.3 (H) 04/24/2021     Most recent fingerstick glucose reviewed-   Recent Labs   Lab 01/11/25  1721 01/11/25  2115 01/12/25  0515 01/12/25  1150   POCTGLUCOSE 158* 315* 275* 277*       Current correctional scale  Medium  Maintain anti-hyperglycemic dose as follows-   Antihyperglycemics (From admission, onward)      Start     Stop Route Frequency Ordered    01/12/25 2100  insulin glargine U-100 (Lantus) pen 10 Units         -- SubQ Nightly 01/12/25 1441    01/10/25 1749  insulin aspart U-100 pen 0-10 Units         -- SubQ Before meals & nightly PRN 01/10/25 1650          Hold Oral hypoglycemics while patient is in the hospital.    1/12/25  Slightly elevated. She takes Toujeo daily. Will start Lantus 10 units nightly      VTE Risk Mitigation (From admission, onward)           Ordered     IP VTE HIGH RISK PATIENT  Once         01/10/25 1650     Place sequential compression device  Until discontinued         01/10/25 1650                    Discharge Planning   EMMETT:      Code Status: Full Code   Medical Readiness for Discharge Date:   Discharge Plan A: Home with family              Layne Lerma NP  Department of Hospital Medicine   [facility]

## 2025-01-12 NOTE — PLAN OF CARE
Problem: Adult Inpatient Plan of Care  Goal: Plan of Care Review  Outcome: Progressing     Problem: Adult Inpatient Plan of Care  Goal: Patient-Specific Goal (Individualized)  Outcome: Progressing     Problem: Skin Injury Risk Increased  Goal: Skin Health and Integrity  Outcome: Progressing     Problem: Fall Injury Risk  Goal: Absence of Fall and Fall-Related Injury  Outcome: Progressing   Purposeful rounding   Call light within reach   Bed in lowest position   Free from falls  Meds given per doc order   Abx given per doc order   Glucose monitored   Chart check complete

## 2025-01-12 NOTE — ASSESSMENT & PLAN NOTE
Xray: Comminuted, displaced and angulated right intertrochanteric fracture. Mid right thigh swelling. Pedal pulse: 2+ on initial evaluation    --Monitor NVI of right leg q4h  --Serial H/H  --Type and screen  --tele  --vitals per protocol  --NPO after midnight  --Orthopedic surgery consulted, planned for surgery in AM    1/11/25  Plans for right femur intramedullary alok insertion today.   Will order PT/TO postoperatively    1/12/25  Hemoglobin down almost 2 grams POD#1. Repeat in AM. May require transfusion  PT/OT recommends HH with 24 supervision. Continue therapy and pain management  Hold DVT prophylaxis today. Can start tomorrow if no abnormal bleeding

## 2025-01-12 NOTE — SUBJECTIVE & OBJECTIVE
Interval History: sitting in chair for lunch. Required assistance x 2 to get back to bed. Encouraged sitting up in chair with meds to increase activity.     Review of Systems   Constitutional:  Positive for activity change, appetite change and fatigue.   Respiratory:  Negative for shortness of breath.    Cardiovascular:  Negative for chest pain and leg swelling.   Gastrointestinal:  Negative for abdominal distention, constipation, nausea and vomiting.   Musculoskeletal:  Positive for arthralgias, gait problem, joint swelling and myalgias.   Skin:  Negative for color change.   Neurological:  Positive for weakness.   Psychiatric/Behavioral:  Negative for agitation.    All other systems reviewed and are negative.    Objective:     Vital Signs (Most Recent):  Temp: 97.3 °F (36.3 °C) (01/12/25 1232)  Pulse: 61 (01/12/25 1232)  Resp: 18 (01/12/25 1232)  BP: (!) 108/54 (01/12/25 1232)  SpO2: 95 % (01/12/25 1232) Vital Signs (24h Range):  Temp:  [97.3 °F (36.3 °C)-98.2 °F (36.8 °C)] 97.3 °F (36.3 °C)  Pulse:  [57-64] 61  Resp:  [12-18] 18  SpO2:  [92 %-100 %] 95 %  BP: (108-144)/(52-78) 108/54     Weight: 61.2 kg (135 lb)  Body mass index is 24.69 kg/m².    Intake/Output Summary (Last 24 hours) at 1/12/2025 1424  Last data filed at 1/12/2025 0357  Gross per 24 hour   Intake 1044.1 ml   Output 600 ml   Net 444.1 ml         Physical Exam  Vitals and nursing note reviewed.   Constitutional:       General: She is not in acute distress.     Appearance: She is normal weight. She is ill-appearing.   HENT:      Head: Normocephalic and atraumatic.      Right Ear: Hearing and external ear normal.      Left Ear: Hearing and external ear normal.      Nose: No rhinorrhea.      Right Sinus: No maxillary sinus tenderness or frontal sinus tenderness.      Left Sinus: No maxillary sinus tenderness or frontal sinus tenderness.      Mouth/Throat:      Mouth: No oral lesions.      Pharynx: Uvula midline.   Eyes:      General:         Right  eye: No discharge.         Left eye: No discharge.      Conjunctiva/sclera: Conjunctivae normal.      Pupils: Pupils are equal, round, and reactive to light.   Neck:      Thyroid: No thyromegaly.      Vascular: No carotid bruit.      Trachea: No tracheal deviation.   Cardiovascular:      Rate and Rhythm: Normal rate and regular rhythm.      Pulses:           Dorsalis pedis pulses are 2+ on the right side and 2+ on the left side.      Heart sounds: S1 normal and S2 normal. Murmur heard.   Pulmonary:      Effort: Pulmonary effort is normal. No respiratory distress.      Breath sounds: Normal breath sounds.   Abdominal:      General: Bowel sounds are decreased. There is distension.      Palpations: Abdomen is soft. There is no mass.      Tenderness: There is no abdominal tenderness.   Musculoskeletal:         General: Normal range of motion.      Cervical back: Normal range of motion.      Right upper leg: No swelling or deformity.      Comments: NVI intact on right   Lymphadenopathy:      Cervical: No cervical adenopathy.      Upper Body:      Right upper body: No supraclavicular adenopathy.      Left upper body: No supraclavicular adenopathy.   Skin:     General: Skin is warm and dry.      Capillary Refill: Capillary refill takes less than 2 seconds.      Findings: No rash.      Comments: Right hip dressing in place. Dried serosanguinous drainage noted.   Neurological:      Mental Status: Mental status is at baseline.   Psychiatric:         Behavior: Behavior is slowed.             Significant Labs: All pertinent labs within the past 24 hours have been reviewed.  CBC:   Recent Labs   Lab 01/10/25  1608 01/11/25  0659 01/12/25  0713   WBC 9.89 5.95 10.47   HGB 12.0 10.4* 8.6*   HCT 36.6* 33.0* 27.8*    176 159     CMP:   Recent Labs   Lab 01/10/25  1608 01/11/25  0659 01/12/25  0712    140 137   K 4.5 4.0 4.0    105 106   CO2 22* 29 24   * 117* 224*   BUN 12 17 17   CREATININE 0.7 0.7 0.8    CALCIUM 8.8 8.4* 7.9*   PROT 6.5 5.3* 5.0*   ALBUMIN 3.5 2.9* 2.5*   BILITOT 1.0 1.1* 0.4   ALKPHOS 77 165* 119   AST 23 424* 79*   ALT 21 313* 137*   ANIONGAP 13 6* 7*       Significant Imaging: I have reviewed all pertinent imaging results/findings within the past 24 hours.

## 2025-01-12 NOTE — PT/OT/SLP EVAL
Physical Therapy Evaluation and Treatment    Patient Name:  Emely Bush   MRN:  [id number]    Recommendations:     Discharge Recommendations: Low Intensity Therapy (WITH 24 HOUR CARE)   Discharge Equipment Recommendations: walker, rolling, shower chair   Barriers to discharge: None-good support from spouse    Assessment:     Emely Bush is a 79 y.o. female admitted with a medical diagnosis of Closed intertrochanteric fracture of right femur, initial encounter.  She presents with the following impairments/functional limitations: weakness, impaired endurance, impaired functional mobility, gait instability, impaired balance, decreased coordination, decreased safety awareness, decreased lower extremity function, pain.    Rehab Prognosis: Fair; patient would benefit from acute skilled PT services to address these deficits and reach maximum level of function.    Recent Surgery: Procedure(s) (LRB):  INSERTION, INTRAMEDULLARY FÁTIMA, FEMUR, PROXIMAL (Right) 1 Day Post-Op    Plan:     During this hospitalization, patient to be seen 3 x/week to address the identified rehab impairments via gait training, therapeutic activities, therapeutic exercises and progress toward the following goals:    Plan of Care Expires:  01/26/25    Subjective     Chief Complaint: NONE, EAGER TO GET OOB  Patient/Family Comments/goals: WANTS TO GO HOME  Pain/Comfort:  Pain Rating 1: 5/10 (NO PAIN AT REST)  Location - Side 1: Right  Location - Orientation 1: generalized  Location 1: leg (INCREASED PAIN WITH MOVEMENT)  Pain Addressed 1: Reposition (ACTIVITY PACING)    Patients cultural, spiritual, Adventism conflicts given the current situation: no    Living Environment:  PT LIVES WITH SPOUSE 1 STORY HOUSE NO STEPS TO ENTER, PT AMB INDEP COMMUNITY DISTANCES, DOES NOT DRIVE, INDEP WITH ADL'S  Prior to admission, patients level of function was INDEP PER PT.  Equipment used at home: grab bar.  DME owned (not currently used): wheelchair and  ROLLATOR .  Upon discharge, patient will have assistance from SPOUSE.    Objective:     Communicated with NURSE JUSTUSPHR prior to session.  Patient found supine with telemetry, peripheral IV, bed alarm, corado catheter  upon PT entry to room.    General Precautions: Standard, fall  Orthopedic Precautions:RLE weight bearing as tolerated   Braces: N/A  Respiratory Status: Nasal cannula, flow 2 L/min    Exams:  Cognitive Exam:  Patient is oriented to Person, Place, and Situation  Postural Exam:  Patient presented with the following abnormalities:    -       Rounded shoulders  -       Forward head  Sensation:    -       Intact  RLE ROM: WFL  RLE Strength: NT  LLE ROM: WFL  LLE Strength: WFL    Functional Mobility:  Bed Mobility:   EXTENDED TIME TO COMPLETE, GOOD EFFORT FOR ACTIVE PARTICIPATION WITH DIRECTION/CUES  Rolling Left:  contact guard assistance  Scooting: contact guard assistance  Supine to Sit: minimum assistance-ASSIST WITH R LEG LIFT  Transfers:     Sit to Stand:  minimum assistance with rolling walker  Bed to Chair: contact guard assistance with  rolling walker  using  Step Transfer  Gait: PT AMB 20' X 2 TRIALS WITH RW AND SHAYNA PROGRESSING TO CGA, SLOW PACE, QUICK TO FATIGUE IN BUE'S, CUES FOR UPRIGHT POSTURE AND RW SAFETY, ENCOURAGED ACTIVITY PACING DURING RECOVERY, STEP TO GAIT PATTERN  Balance: FAIR SITTING BALANCE, POOR+ DYNAMIC BALANCE DURING GAIT  PT EDUCATED IN AND PERFORMED SEATED BLE THEREX X 8 REPS AROM WITH REST AS NEEDED: HIP FLEX/EXT, LAQ, QUAD SET, HEEL SLIDES, AP'S    AM-PAC 6 CLICK MOBILITY  Total Score:16     Treatment & Education:  PT EDUCATION:  - ROLE OF P.T. AND POC IN ACUTE CARE HOSPITAL SETTING  - RW USE AND SAFETY DURING TF'S AND GAIT  - ENCOURAGED TO INCREASE TIME OOB IN CHAIR TO TOLERANCE   - TO CONTINUE THERAPUETIC EXERCISES THROUGHOUT THE DAY TO INCREASE ACTIVITY TOLERANCE AND DECREASE RISK FOR PNEUMONIA AND BLOOD CLOTS: HIP FLEX/EXT, HIP ABD/ADD, QUAD SET, HEEL SLIDE, AP  - RISK FOR  "FALLS DUE TO GENERALIZED WEAKNESS, EDUCATED ON "CALL DON'T FALL", ENCOURAGED TO CALL FOR ASSISTANCE WITH ALL NEEDS SUCH AS BED<>CHAIR TRANSFERS OR TRIPS TO BATHROOM, PT AGREEABLE TO SAFETY PRECAUTIONS    Patient left up in chair with all lines intact, call button in reach, chair alarm on, and NURSE notified. NURSE NOTIFIED CHAIR ALARM NOT WORKING    GOALS:   Multidisciplinary Problems       Physical Therapy Goals          Problem: Physical Therapy    Goal Priority Disciplines Outcome Interventions   Physical Therapy Goal     PT, PT/OT     Description: LTG'S TO BE MET IN 14 DAYS (1-26-25)  PT WILL REQUIRE SPV FOR BED MOBILITY  PT WILL REQUIRE SBA FOR BED<>CHAIR TF'S  PT WILL  FEET WITH RW AND SBA  PT WILL INC AMPAC SCORE BY 2 POINTS TO PROGRESS GROSS FUNC MOBILITY                         History:     Past Medical History:   Diagnosis Date    Coronary artery disease     Diabetes mellitus     Hypertension        Past Surgical History:   Procedure Laterality Date    APPENDECTOMY      CARDIAC SURGERY      CHOLECYSTECTOMY      CORONARY ARTERY BYPASS GRAFT      HYSTERECTOMY      INTRAMEDULLARY RODDING OF FEMUR Left 4/25/2021    Procedure: INSERTION, INTREMEDULLARY FÁTIMA, FEMUR;  Surgeon: An Mart MD;  Location: [facility];  Service: Orthopedics;  Laterality: Left;  Left hip cephalomedullarly nail, or spinal/sifi block    THYROID SURGERY  1995       Time Tracking:     PT Received On: 01/12/25  PT Start Time: 0720     PT Stop Time: 0800  PT Total Time (min): 40 min     Billable Minutes: Evaluation 15 and Therapeutic Activity 25 01/12/2025  "

## 2025-01-12 NOTE — PT/OT/SLP EVAL
"Occupational Therapy Evaluation and Treatment    Name: Emely Bush  MRN: [id number]  Admitting Diagnosis: Closed intertrochanteric fracture of right femur, initial encounter  Recent Surgery: Procedure(s) (LRB):  INSERTION, INTRAMEDULLARY FÁTIMA, FEMUR, PROXIMAL (Right) 1 Day Post-Op    Recommendations:     Discharge Recommendations: Low Intensity Therapy (24/7 SPV and A)  Level of Assistance Recommended: 24 hours light assistance and 24 hours supervision  Discharge Equipment Recommendations: walker, rolling, shower chair  Barriers to discharge: None    Assessment:     Emely Bush is a 79 y.o. female with a medical diagnosis of Closed intertrochanteric fracture of right femur, initial encounter. She presents with performance deficits affecting function including weakness, impaired endurance, impaired self care skills, impaired functional mobility, impaired balance, pain, edema, orthopedic precautions, impaired cardiopulmonary response to activity.    Rehab Prognosis: Fair; patient would benefit from acute OT services to address these deficits and reach maximum level of function.    Plan:     Patient to be seen 2 x/week to address the above listed problems via self-care/home management, therapeutic activities, therapeutic exercises  Plan of Care Expires: 01/26/25  Plan of Care Reviewed with: patient    Subjective     Chief Complaint: Reported "I am doing fine."  Patient Comments/Goals: none reported  Pain/Comfort:  Pain Rating 1: 0/10 (at rest, increased with movement (however patient did not rate))  Location 1:  (R LE)  Pain Addressed 1:  (activity pacing)    Social History:  Living Environment: Patient lives with their spouse in a single story home with  no steps/stairs to enter.  Prior Level of Function: Prior to admission, patient was independent with ADLs and community distance ambulation.  Roles and Routines: Patient was not driving and not working prior to admission.  Equipment Used at Home: grab " bar  DME owned (not currently used): rollator and wheelchair  Assistance Upon Discharge: family    Objective:     Communicated with charge nurse, Tika, prior to session. Patient found supine with peripheral IV, telemetry, bed alarm, corado catheter, and oxygen upon OT entry to room.    General Precautions: Standard, fall   Orthopedic Precautions: RLE weight bearing as tolerated   Braces: N/A    Respiratory Status: Nasal cannula, flow 3 L/min    Occupational Performance    Gait belt applied - Yes    Bed Mobility:   Supine to sit from left side of bed with minimum assistance    Functional Mobility/Transfers:  Sit <> Stand Transfer with minimum assistance with rolling walker  Bed <> Chair Transfer using Step Transfer technique with contact guard assistance with rolling walker  Functional Mobility: Patient completed x20ft x2 trials functional mobility with RW and min A (progressing to CGA) to increase dynamic standing balance and activity tolerance needed for ADL completion.  Provided with v/c for technique with transfers to increase safety and independence with completion  Improvements in mobility with repetition of task    Activities of Daily Living:  Grooming: set up assistance  Upper Body Dressing: minimum assistance    Cognitive/Visual Perceptual:  Cognitive/Psychosocial Skills:    -     Oriented to: Person, Place, Time, Situation  -     Follows Commands/attention: Easily distracted and Follows one-step commands  Intermittently requiring repetition of commands- mild cognitive impairment noted    Physical Exam:  Balance:    -     Sitting: stand by assistance  -     Standing: contact guard assistance  Upper Extremity Range of Motion:     -       Right Upper Extremity: WFL  -       Left Upper Extremity: WFL  Upper Extremity Strength:    -       Right Upper Extremity: Deficits: grossly 4/5  -       Left Upper Extremity: Deficits: grossly 4/5   Strength:    -       Right Upper Extremity: Deficits: fair  -        Left Upper Extremity: Deficits: fair    AMPAC 6 Click ADL:  AMPAC Total Score: 16    Treatment & Education:  Therapist provided facilitation and instruction of proper body mechanics, energy conservation, and fall prevention strategies during tasks listed above  Patient educated on role of OT, POC, and goals for therapy  Patient educated on importance of OOB activities with staff member assistance and sitting OOB majority of the day  Encouraged completion of B UE AROM therex throughout the day to increase functional strength and activity tolerance needed for ADL completion.    Patient left up in chair with all lines intact, call button in reach, and chair alarm on.    GOALS:   Multidisciplinary Problems       Occupational Therapy Goals          Problem: Occupational Therapy    Goal Priority Disciplines Outcome Interventions   Occupational Therapy Goal     OT, PT/OT     Description: Goals to be met by: 1/26/25     Patient will increase functional independence with ADLs by performing:    Toileting from bedside commode with Stand-by Assistance for hygiene and clothing management.   Toilet transfer to bedside commode with Stand-by Assistance.  Increased functional strength in B UE grossly by 1/2 MM grade.                         History:     Past Medical History:   Diagnosis Date    Coronary artery disease     Diabetes mellitus     Hypertension          Past Surgical History:   Procedure Laterality Date    APPENDECTOMY      CARDIAC SURGERY      CHOLECYSTECTOMY      CORONARY ARTERY BYPASS GRAFT      HYSTERECTOMY      INTRAMEDULLARY RODDING OF FEMUR Left 4/25/2021    Procedure: INSERTION, INTREMEDULLARY FÁTIMA, FEMUR;  Surgeon: An Mart MD;  Location: [facility];  Service: Orthopedics;  Laterality: Left;  Left hip cephalomedullarly nail, or spinal/sifi block    THYROID SURGERY  1995       Time Tracking:     OT Date of Treatment: 01/12/25  OT Start Time: 0735  OT Stop Time: 0805  OT Total Time (min): 30 min    Billable  Minutes: Evaluation 15 and Therapeutic Activity 15    Jodi Arevalo, OT  1/12/2025

## 2025-01-12 NOTE — PLAN OF CARE
Discussed poc with pt and , verbalized understanding     Purposeful rounding every 2hours    Pt c/o severe headache this AM with blurred vision, pt requested Tylenol and an ice pack, reported full relief     VS wnl  Blood glucose monitoring, supplemental insulin given as ordered  Fall precautions in place, remains injury free     IVFs  Accurate I&Os  Bed locked at lowest position  Call light within reach     Chart check complete  Will cont with POC

## 2025-01-12 NOTE — PROGRESS NOTES
[facility]  Orthopedics  Progress Note    Patient Name: Emely Bush  MRN: [id number]  Admission Date: 1/10/2025  Hospital Length of Stay: 2 days  Attending Provider: Elizabeth Small MD  Primary Care Provider: Dara Talley MD  Follow-up For: Procedure(s) (LRB):  INSERTION, INTRAMEDULLARY FÁTIMA, FEMUR, PROXIMAL (Right)    Post-Operative Day: 1 Day Post-Op  Subjective:     Principal Problem:Closed intertrochanteric fracture of right femur, initial encounter    Principal Orthopedic Problem:  Right intertrochanteric femur fracture    Interval History: Emely Bush is a 79 y.o. female postop day 1 status post operative repair of right intertrochanteric femur fracture with cephalomedullary nail.  Patient is currently working with physical therapy on ambulation.  She states pain is improved compared to before surgery, but she is experiencing some soreness.  Denies numbness or tingling in the extremity.    Review of patient's allergies indicates:  No Known Allergies    Current Facility-Administered Medications   Medication    acetaminophen tablet 650 mg    amLODIPine tablet 10 mg    atorvastatin tablet 20 mg    bisacodyL suppository 10 mg    carvediloL tablet 25 mg    chlorhexidine 0.12 % solution 10 mL    dextrose 50% injection 12.5 g    dextrose 50% injection 12.5 g    dextrose 50% injection 25 g    donepeziL tablet 10 mg    FLUoxetine capsule 40 mg    glucagon (human recombinant) injection 1 mg    glucagon (human recombinant) injection 1 mg    glucose chewable tablet 16 g    glucose chewable tablet 24 g    hydrALAZINE injection 10 mg    HYDROcodone-acetaminophen 5-325 mg per tablet 1 tablet    HYDROmorphone injection 0.2 mg    insulin aspart U-100 pen 0-10 Units    levothyroxine tablet 100 mcg    LORazepam injection 2 mg    melatonin tablet 6 mg    morphine injection 2 mg    naloxone 0.4 mg/mL injection 0.02 mg    ondansetron injection 4 mg    ondansetron injection 4 mg    ondansetron injection 4 mg     "oxyCODONE immediate release tablet 5 mg    pantoprazole EC tablet 40 mg    polyethylene glycol packet 17 g    senna-docusate 8.6-50 mg per tablet 1 tablet    sodium chloride 0.9% flush 10 mL    thiamine tablet 100 mg    traMADoL tablet 50 mg     Objective:     Vital Signs (Most Recent):  Temp: 98.2 °F (36.8 °C) (01/12/25 0835)  Pulse: 64 (01/12/25 0835)  Resp: 18 (01/12/25 0835)  BP: (!) 111/56 (01/12/25 0842)  SpO2: 96 % (01/12/25 0835) Vital Signs (24h Range):  Temp:  [97.5 °F (36.4 °C)-98.2 °F (36.8 °C)] 98.2 °F (36.8 °C)  Pulse:  [57-64] 64  Resp:  [12-18] 18  SpO2:  [92 %-100 %] 96 %  BP: (110-144)/(52-78) 111/56     Weight: 61.2 kg (135 lb)  Height: 5' 2" (157.5 cm)  Body mass index is 24.69 kg/m².      Intake/Output Summary (Last 24 hours) at 1/12/2025 0913  Last data filed at 1/12/2025 0357  Gross per 24 hour   Intake 1044.1 ml   Output 600 ml   Net 444.1 ml       Ortho/SPM Exam  Right lower extremity:   Dressings are clean, dry, and intact   Mild edema throughout the thigh   Mild TTP around the incision   No pain with ROM or internal/external rotation   Calf and compartments are soft and compressible   Motor exam normal   Sensation and pulses intact  Cap refill brisk    GEN: Well developed, well nourished female. AAOX3. No acute distress.   Head: Normocephalic, atraumatic.   Eyes: CAMERON  Neck: Trachea is midline, no adenopathy  Resp: Breathing unlabored.  Neuro: Motor function normal, Cranial nerves intact  Psych: Mood and affect appropriate.      Significant Labs:   Recent Lab Results  (Last 5 results in the past 24 hours)        01/12/25  0713   01/12/25  0712   01/12/25  0515   01/11/25  2115   01/11/25  1721        Albumin   2.5             ALP   119             ALT   137             Anion Gap   7             AST   79             Baso # 0.01               Basophil % 0.1               BILIRUBIN TOTAL   0.4  Comment: For infants and newborns, interpretation of results should be based  on gestational age, " weight and in agreement with clinical  observations.    Premature Infant recommended reference ranges:  Up to 24 hours.............<8.0 mg/dL  Up to 48 hours............<12.0 mg/dL  3-5 days..................<15.0 mg/dL  6-29 days.................<15.0 mg/dL               BUN   17             Calcium   7.9             Chloride   106             CO2   24             Creatinine   0.8             Differential Method Automated               eGFR   >60             Eos # 0.0               Eos % 0.0               Glucose   224             Gran # (ANC) 8.9               Gran % 84.8               Hematocrit 27.8               Hemoglobin 8.6               Immature Grans (Abs) 0.06  Comment: Mild elevation in immature granulocytes is non specific and   can be seen in a variety of conditions including stress response,   acute inflammation, trauma and pregnancy. Correlation with other   laboratory and clinical findings is essential.                 Immature Granulocytes 0.6               Lymph # 0.9               Lymph % 8.7               Magnesium    1.8             MCH 28.3               MCHC 30.9               MCV 91               Mono # 0.6               Mono % 5.8               MPV 10.1               nRBC 0               Phosphorus Level   3.4             Platelet Count 159               POCT Glucose     275   315   158       Potassium   4.0             PROTEIN TOTAL   5.0             RBC 3.04               RDW 13.1               Sodium   137             WBC 10.47                                    All pertinent labs within the past 24 hours have been reviewed.    Significant Imaging: I have reviewed and interpreted all pertinent imaging results/findings.    Assessment/Plan:     Active Diagnoses:    Diagnosis Date Noted POA    PRINCIPAL PROBLEM:  Closed intertrochanteric fracture of right femur, initial encounter [S72.141A] 01/10/2025 Yes    Primary hypertension [I10] 01/10/2025 Yes    Osteopenia after menopause [M85.80,  Z78.0] 06/02/2021 Not Applicable    Chronic systolic heart failure [I50.22] 03/08/2016 Yes    Controlled type 2 diabetes with neuropathy [E11.40] 03/26/2013 Yes    Hypothyroidism [E03.9] 03/26/2013 Yes      Problems Resolved During this Admission:       Assessment:  79 y.o. female postop day 1 status post operative repair of right intertrochanteric femur fracture with cephalomedullary nail    Plan:  Weightbearing as tolerated to the right lower extremity   PT/OT for gait training and ADLs   Rolling walker for ambulation   DVT prophylaxis per primary team  Patient is ready for discharge from orthopedic standpoint once arrangements have been made   We will see the patient back in [facility]uma Clinic at 3 weeks postop    Owen Ortiz PA-C  Orthopedics  [facility]

## 2025-01-12 NOTE — PLAN OF CARE
Discussed poc with pt, pt verbalized understanding    Purposeful rounding every 2hours    VS wnl  Blood glucose monitoring, supplemental insulin given as ordered  Fall precautions in place, remains injury free    IVFs  Accurate I&Os  Bed locked at lowest position  Call light within reach    Chart check complete  Will cont with POC

## 2025-01-12 NOTE — PLAN OF CARE
Discussed poc with pt, pt verbalized understanding     Purposeful rounding every 2hours     VS wnl  Blood glucose monitoring, supplemental insulin given as ordered  Fall precautions in place, remains injury free  Ohara catheter removed per MD orders; pt due to void     IVFs  Accurate I&Os  Bed locked at lowest position  Call light within reach     Chart check complete  Will cont with POC

## 2025-01-12 NOTE — PLAN OF CARE
P.T. EVAL COMPLETE.  PT CURRENTLY REQUIRES SHAYNA FOR BED MOBILITY AND TF'S, MIN/CGA FOR GAIT WITH RW.  P.T. RECOMMENDS LOW INTENSITY THERAPY UPON DISCHARGE WITH 24 HOUR CARE.  DME: ROLLING WALKER

## 2025-01-13 PROBLEM — D64.9 ANEMIA: Status: ACTIVE | Noted: 2025-01-13

## 2025-01-13 LAB
ABO + RH BLD: NORMAL
ALBUMIN SERPL BCP-MCNC: 2.3 G/DL (ref 3.5–5.2)
ALP SERPL-CCNC: 89 U/L (ref 40–150)
ALT SERPL W/O P-5'-P-CCNC: 69 U/L (ref 10–44)
ANION GAP SERPL CALC-SCNC: 8 MMOL/L (ref 8–16)
AST SERPL-CCNC: 25 U/L (ref 10–40)
BASOPHILS # BLD AUTO: 0.02 K/UL (ref 0–0.2)
BASOPHILS NFR BLD: 0.2 % (ref 0–1.9)
BILIRUB SERPL-MCNC: 0.4 MG/DL (ref 0.1–1)
BLD GP AB SCN CELLS X3 SERPL QL: NORMAL
BLD PROD TYP BPU: NORMAL
BLOOD UNIT EXPIRATION DATE: NORMAL
BLOOD UNIT TYPE CODE: 600
BLOOD UNIT TYPE: NORMAL
BUN SERPL-MCNC: 14 MG/DL (ref 8–23)
CALCIUM SERPL-MCNC: 8.1 MG/DL (ref 8.7–10.5)
CHLORIDE SERPL-SCNC: 107 MMOL/L (ref 95–110)
CO2 SERPL-SCNC: 25 MMOL/L (ref 23–29)
CODING SYSTEM: NORMAL
CREAT SERPL-MCNC: 0.7 MG/DL (ref 0.5–1.4)
CROSSMATCH INTERPRETATION: NORMAL
DIFFERENTIAL METHOD BLD: ABNORMAL
DISPENSE STATUS: NORMAL
EOSINOPHIL # BLD AUTO: 0 K/UL (ref 0–0.5)
EOSINOPHIL NFR BLD: 0.2 % (ref 0–8)
ERYTHROCYTE [DISTWIDTH] IN BLOOD BY AUTOMATED COUNT: 13.2 % (ref 11.5–14.5)
EST. GFR  (NO RACE VARIABLE): >60 ML/MIN/1.73 M^2
GLUCOSE SERPL-MCNC: 150 MG/DL (ref 70–110)
HCT VFR BLD AUTO: 23.6 % (ref 37–48.5)
HGB BLD-MCNC: 7.6 G/DL (ref 12–16)
IMM GRANULOCYTES # BLD AUTO: 0.03 K/UL (ref 0–0.04)
IMM GRANULOCYTES NFR BLD AUTO: 0.3 % (ref 0–0.5)
IRON SERPL-MCNC: 16 UG/DL (ref 30–160)
LYMPHOCYTES # BLD AUTO: 2.3 K/UL (ref 1–4.8)
LYMPHOCYTES NFR BLD: 23.7 % (ref 18–48)
MAGNESIUM SERPL-MCNC: 1.7 MG/DL (ref 1.6–2.6)
MCH RBC QN AUTO: 28.9 PG (ref 27–31)
MCHC RBC AUTO-ENTMCNC: 32.2 G/DL (ref 32–36)
MCV RBC AUTO: 90 FL (ref 82–98)
MONOCYTES # BLD AUTO: 0.7 K/UL (ref 0.3–1)
MONOCYTES NFR BLD: 7.1 % (ref 4–15)
NEUTROPHILS # BLD AUTO: 6.5 K/UL (ref 1.8–7.7)
NEUTROPHILS NFR BLD: 68.5 % (ref 38–73)
NRBC BLD-RTO: 0 /100 WBC
NUM UNITS TRANS PACKED RBC: NORMAL
PHOSPHATE SERPL-MCNC: 2.7 MG/DL (ref 2.7–4.5)
PLATELET # BLD AUTO: 170 K/UL (ref 150–450)
PMV BLD AUTO: 10.3 FL (ref 9.2–12.9)
POCT GLUCOSE: 146 MG/DL (ref 70–110)
POCT GLUCOSE: 184 MG/DL (ref 70–110)
POCT GLUCOSE: 185 MG/DL (ref 70–110)
POCT GLUCOSE: 192 MG/DL (ref 70–110)
POTASSIUM SERPL-SCNC: 4.1 MMOL/L (ref 3.5–5.1)
PROT SERPL-MCNC: 4.7 G/DL (ref 6–8.4)
RBC # BLD AUTO: 2.63 M/UL (ref 4–5.4)
SATURATED IRON: 7 % (ref 20–50)
SODIUM SERPL-SCNC: 140 MMOL/L (ref 136–145)
SPECIMEN OUTDATE: NORMAL
TOTAL IRON BINDING CAPACITY: 223 UG/DL (ref 250–450)
TRANSFERRIN SERPL-MCNC: 151 MG/DL (ref 200–375)
WBC # BLD AUTO: 9.48 K/UL (ref 3.9–12.7)

## 2025-01-13 PROCEDURE — 97530 THERAPEUTIC ACTIVITIES: CPT

## 2025-01-13 PROCEDURE — 25000003 PHARM REV CODE 250: Performed by: ORTHOPAEDIC SURGERY

## 2025-01-13 PROCEDURE — 99024 POSTOP FOLLOW-UP VISIT: CPT | Mod: ,,, | Performed by: PHYSICIAN ASSISTANT

## 2025-01-13 PROCEDURE — 85025 COMPLETE CBC W/AUTO DIFF WBC: CPT | Performed by: ORTHOPAEDIC SURGERY

## 2025-01-13 PROCEDURE — 99900035 HC TECH TIME PER 15 MIN (STAT)

## 2025-01-13 PROCEDURE — 83735 ASSAY OF MAGNESIUM: CPT | Performed by: ORTHOPAEDIC SURGERY

## 2025-01-13 PROCEDURE — 11000001 HC ACUTE MED/SURG PRIVATE ROOM

## 2025-01-13 PROCEDURE — 25000003 PHARM REV CODE 250: Performed by: HOSPITALIST

## 2025-01-13 PROCEDURE — 84466 ASSAY OF TRANSFERRIN: CPT | Performed by: INTERNAL MEDICINE

## 2025-01-13 PROCEDURE — 30233N1 TRANSFUSION OF NONAUTOLOGOUS RED BLOOD CELLS INTO PERIPHERAL VEIN, PERCUTANEOUS APPROACH: ICD-10-PCS | Performed by: INTERNAL MEDICINE

## 2025-01-13 PROCEDURE — 80053 COMPREHEN METABOLIC PANEL: CPT | Performed by: ORTHOPAEDIC SURGERY

## 2025-01-13 PROCEDURE — 97535 SELF CARE MNGMENT TRAINING: CPT

## 2025-01-13 PROCEDURE — 36415 COLL VENOUS BLD VENIPUNCTURE: CPT | Performed by: INTERNAL MEDICINE

## 2025-01-13 PROCEDURE — P9016 RBC LEUKOCYTES REDUCED: HCPCS | Performed by: INTERNAL MEDICINE

## 2025-01-13 PROCEDURE — 94799 UNLISTED PULMONARY SVC/PX: CPT | Mod: XB

## 2025-01-13 PROCEDURE — 84100 ASSAY OF PHOSPHORUS: CPT | Performed by: ORTHOPAEDIC SURGERY

## 2025-01-13 PROCEDURE — 25000003 PHARM REV CODE 250: Performed by: FAMILY MEDICINE

## 2025-01-13 PROCEDURE — 36430 TRANSFUSION BLD/BLD COMPNT: CPT

## 2025-01-13 PROCEDURE — 86850 RBC ANTIBODY SCREEN: CPT | Performed by: INTERNAL MEDICINE

## 2025-01-13 PROCEDURE — 86920 COMPATIBILITY TEST SPIN: CPT | Performed by: INTERNAL MEDICINE

## 2025-01-13 PROCEDURE — 36415 COLL VENOUS BLD VENIPUNCTURE: CPT | Performed by: ORTHOPAEDIC SURGERY

## 2025-01-13 RX ORDER — HYDROCODONE BITARTRATE AND ACETAMINOPHEN 500; 5 MG/1; MG/1
TABLET ORAL
Status: DISCONTINUED | OUTPATIENT
Start: 2025-01-13 | End: 2025-01-15 | Stop reason: HOSPADM

## 2025-01-13 RX ORDER — ENOXAPARIN SODIUM 100 MG/ML
40 INJECTION SUBCUTANEOUS EVERY 24 HOURS
Status: DISCONTINUED | OUTPATIENT
Start: 2025-01-13 | End: 2025-01-15 | Stop reason: HOSPADM

## 2025-01-13 RX ADMIN — MELATONIN TAB 3 MG 6 MG: 3 TAB at 09:01

## 2025-01-13 RX ADMIN — INSULIN GLARGINE 10 UNITS: 100 INJECTION, SOLUTION SUBCUTANEOUS at 09:01

## 2025-01-13 RX ADMIN — OXYCODONE 5 MG: 5 TABLET ORAL at 09:01

## 2025-01-13 RX ADMIN — AMLODIPINE BESYLATE 10 MG: 10 TABLET ORAL at 09:01

## 2025-01-13 RX ADMIN — CHLORHEXIDINE GLUCONATE 0.12% ORAL RINSE 10 ML: 1.2 LIQUID ORAL at 09:01

## 2025-01-13 RX ADMIN — CARVEDILOL 25 MG: 12.5 TABLET, FILM COATED ORAL at 09:01

## 2025-01-13 RX ADMIN — SENNOSIDES AND DOCUSATE SODIUM 1 TABLET: 50; 8.6 TABLET ORAL at 09:01

## 2025-01-13 RX ADMIN — FLUOXETINE HYDROCHLORIDE 40 MG: 20 CAPSULE ORAL at 09:01

## 2025-01-13 RX ADMIN — PANTOPRAZOLE SODIUM 40 MG: 40 TABLET, DELAYED RELEASE ORAL at 09:01

## 2025-01-13 RX ADMIN — LEVOTHYROXINE SODIUM 100 MCG: 100 TABLET ORAL at 06:01

## 2025-01-13 RX ADMIN — HYDROCODONE BITARTRATE AND ACETAMINOPHEN 1 TABLET: 5; 325 TABLET ORAL at 04:01

## 2025-01-13 RX ADMIN — ATORVASTATIN CALCIUM 20 MG: 10 TABLET, FILM COATED ORAL at 09:01

## 2025-01-13 RX ADMIN — DONEPEZIL HYDROCHLORIDE 10 MG: 5 TABLET, FILM COATED ORAL at 09:01

## 2025-01-13 RX ADMIN — Medication 100 MG: at 09:01

## 2025-01-13 RX ADMIN — INSULIN ASPART 2 UNITS: 100 INJECTION, SOLUTION INTRAVENOUS; SUBCUTANEOUS at 11:01

## 2025-01-13 NOTE — PLAN OF CARE
Discussed poc with pt, pt verbalized understanding    Purposeful rounding every 2hours    VS wnl  Blood glucose monitoring   Fall precautions in place, remains injury free  Pain and nausea under control with PRN meds    IVFs  Accurate I&Os  1 unit PRBC's administered; pt tolerated well  Bed locked at lowest position  Call light within reach    Chart check complete  Will cont with POC

## 2025-01-13 NOTE — CONSULTS
01/13/25 1140   Post-Acute Status   Post-Acute Authorization Placement   Post-Acute Placement Status Referrals Sent   Discharge Delays None known at this time   Discharge Plan   Discharge Plan A Skilled Nursing Facility     Sw met with pt and spouse, Colin, at bedside, to discuss treatment team recommendations for SNF placement vs HH with 24/7 supervision from family upon d/c. Pt and spouse are agreeable to SNF placement upon d/c. Preference for SNF is [facility] since pt has been there previously. Pt and spouse are agreeable for Sw to also send additional referrals if [facility] is unable to accept.      01/13/25 1309   Post-Acute Status   Post-Acute Authorization Placement   Post-Acute Placement Status Pending payor review/awaiting authorization (if required)   Hospital Resources/Appts/Education Provided Post-Acute resouces added to AVS   Discharge Delays None known at this time   Discharge Plan   Discharge Plan A Skilled Nursing Facility     1305 Sw met with pt and spouse at bedside this afternoon to update them on preferred SNF not having bed availability. Pt and spouse stated they met with Scot with [facility] and that is their second choice for SNF. Scot with [facility] to submit for insurance auth today for d/c tomorrow.

## 2025-01-13 NOTE — ASSESSMENT & PLAN NOTE
Xray: Comminuted, displaced and angulated right intertrochanteric fracture. Mid right thigh swelling. Pedal pulse: 2+ on initial evaluation    --Monitor NVI of right leg q4h  --Serial H/H  --Type and screen  --tele  --vitals per protocol  --NPO after midnight  --Orthopedic surgery consulted, planned for surgery in AM    1/11/25  Plans for right femur intramedullary alok insertion today.   Will order PT/TO postoperatively    1/12/25  Hemoglobin down almost 2 grams POD#1. Repeat in AM. May require transfusion  PT/OT seen Continue therapy and pain management    Start DVT prophylaxis

## 2025-01-13 NOTE — PROGRESS NOTES
[facility] S[facility]  Orthopedics  Progress Note    Patient Name: Emely Bush  MRN: [id number]  Admission Date: 1/10/2025  Hospital Length of Stay: 3 days  Attending Provider: Marya Wadsworth MD  Primary Care Provider: Dara Talley MD  Follow-up For: Procedure(s) (LRB):  INSERTION, INTRAMEDULLARY FÁTIMA, FEMUR, PROXIMAL (Right)    Post-Operative Day: 2 Day Post-Op  Subjective:     Principal Problem:Closed intertrochanteric fracture of right femur, initial encounter    Principal Orthopedic Problem:  Right intertrochanteric femur fracture    Interval History: Emely Bush is a 79 y.o. female postop day 2 status post operative repair of right intertrochanteric femur fracture with cephalomedullary nail.  Patient is resting comfortably in bed.  Denies numbness or tingling in the extremity.    Review of patient's allergies indicates:  No Known Allergies    Current Facility-Administered Medications   Medication    0.9%  NaCl infusion (for blood administration)    acetaminophen tablet 650 mg    amLODIPine tablet 10 mg    atorvastatin tablet 20 mg    bisacodyL suppository 10 mg    carvediloL tablet 25 mg    chlorhexidine 0.12 % solution 10 mL    dextrose 50% injection 12.5 g    dextrose 50% injection 12.5 g    dextrose 50% injection 25 g    donepeziL tablet 10 mg    FLUoxetine capsule 40 mg    glucagon (human recombinant) injection 1 mg    glucagon (human recombinant) injection 1 mg    glucose chewable tablet 16 g    glucose chewable tablet 24 g    hydrALAZINE injection 10 mg    HYDROcodone-acetaminophen 5-325 mg per tablet 1 tablet    HYDROmorphone injection 0.2 mg    insulin aspart U-100 pen 0-10 Units    insulin glargine U-100 (Lantus) pen 10 Units    levothyroxine tablet 100 mcg    LORazepam injection 2 mg    melatonin tablet 6 mg    morphine injection 2 mg    naloxone 0.4 mg/mL injection 0.02 mg    ondansetron injection 4 mg    ondansetron injection 4 mg    ondansetron injection 4 mg    oxyCODONE immediate  "release tablet 5 mg    pantoprazole EC tablet 40 mg    polyethylene glycol packet 17 g    senna-docusate 8.6-50 mg per tablet 1 tablet    sodium chloride 0.9% flush 10 mL    thiamine tablet 100 mg    traMADoL tablet 50 mg     Objective:     Vital Signs (Most Recent):  Temp: 98 °F (36.7 °C) (01/13/25 0831)  Pulse: 67 (01/13/25 0831)  Resp: 16 (01/13/25 0831)  BP: 126/60 (01/13/25 0831)  SpO2: 95 % (01/13/25 0831) Vital Signs (24h Range):  Temp:  [97.3 °F (36.3 °C)-98.5 °F (36.9 °C)] 98 °F (36.7 °C)  Pulse:  [59-72] 67  Resp:  [16-20] 16  SpO2:  [95 %-99 %] 95 %  BP: (108-139)/(54-63) 126/60     Weight: 61.2 kg (135 lb)  Height: 5' 2" (157.5 cm)  Body mass index is 24.69 kg/m².      Intake/Output Summary (Last 24 hours) at 1/13/2025 0849  Last data filed at 1/12/2025 1755  Gross per 24 hour   Intake 1064.05 ml   Output 300 ml   Net 764.05 ml       Ortho/SPM Exam  Right lower extremity:   Dressings are intact   Dressing over the distal incision is saturated with blood   Mild edema throughout the thigh   Mild TTP around the incision   No pain with ROM or internal/external rotation   Calf and compartments are soft and compressible   Motor exam normal   Sensation and pulses intact  Cap refill brisk    GEN: Well developed, well nourished female. AAOX3. No acute distress.   Head: Normocephalic, atraumatic.   Eyes: CAMERON  Neck: Trachea is midline, no adenopathy  Resp: Breathing unlabored.  Neuro: Motor function normal, Cranial nerves intact  Psych: Mood and affect appropriate.      Significant Labs:   Recent Lab Results  (Last 5 results in the past 24 hours)        01/13/25  0647   01/13/25  0609   01/12/25  2150   01/12/25  1657   01/12/25  1150        Albumin 2.3               ALP 89               ALT 69               Anion Gap 8               AST 25               Baso # 0.02               Basophil % 0.2               BILIRUBIN TOTAL 0.4  Comment: For infants and newborns, interpretation of results should be based  on " gestational age, weight and in agreement with clinical  observations.    Premature Infant recommended reference ranges:  Up to 24 hours.............<8.0 mg/dL  Up to 48 hours............<12.0 mg/dL  3-5 days..................<15.0 mg/dL  6-29 days.................<15.0 mg/dL                 BUN 14               Calcium 8.1               Chloride 107               CO2 25               Creatinine 0.7               Differential Method Automated               eGFR >60               Eos # 0.0               Eos % 0.2               Glucose 150               Gran # (ANC) 6.5               Gran % 68.5               Hematocrit 23.6               Hemoglobin 7.6               Immature Grans (Abs) 0.03  Comment: Mild elevation in immature granulocytes is non specific and   can be seen in a variety of conditions including stress response,   acute inflammation, trauma and pregnancy. Correlation with other   laboratory and clinical findings is essential.                 Immature Granulocytes 0.3               Lymph # 2.3               Lymph % 23.7               Magnesium  1.7               MCH 28.9               MCHC 32.2               MCV 90               Mono # 0.7               Mono % 7.1               MPV 10.3               nRBC 0               Phosphorus Level 2.7               Platelet Count 170               POCT Glucose   146   247   329   277       Potassium 4.1               PROTEIN TOTAL 4.7               RBC 2.63               RDW 13.2               Sodium 140               WBC 9.48                                    All pertinent labs within the past 24 hours have been reviewed.    Significant Imaging: I have reviewed and interpreted all pertinent imaging results/findings.    Assessment/Plan:     Active Diagnoses:    Diagnosis Date Noted POA    PRINCIPAL PROBLEM:  Closed intertrochanteric fracture of right femur, initial encounter [S72.141A] 01/10/2025 Yes    Primary hypertension [I10] 01/10/2025 Yes    Osteopenia  after menopause [M85.80, Z78.0] 06/02/2021 Not Applicable    Chronic systolic heart failure [I50.22] 03/08/2016 Yes    Controlled type 2 diabetes with neuropathy [E11.40] 03/26/2013 Yes    Hypothyroidism [E03.9] 03/26/2013 Yes      Problems Resolved During this Admission:       Assessment:  79 y.o. female postop day 2 status post operative repair of right intertrochanteric femur fracture with cephalomedullary nail    Plan:  Weightbearing as tolerated to the right lower extremity   PT/OT for gait training and ADLs   Rolling walker for ambulation   DVT prophylaxis per primary team  Dressings were changed today, leave these in place until clinic follow up or change them if they become saturated or begin to peel  Patient is ready for discharge from orthopedic standpoint once arrangements have been made   We will see the patient back in Ortho Trauma Clinic at 3 weeks postop    Owen Ortiz PA-C  Orthopedics  [facility] - Med Surg

## 2025-01-13 NOTE — ASSESSMENT & PLAN NOTE
Patient's FSGs are controlled on current medication regimen.  Last A1c reviewed-   Lab Results   Component Value Date    HGBA1C 7.3 (H) 04/24/2021     Most recent fingerstick glucose reviewed-   Recent Labs   Lab 01/12/25  2150 01/13/25  0609 01/13/25  1139 01/13/25  1650   POCTGLUCOSE 247* 146* 185* 184*       Current correctional scale  Medium  Maintain anti-hyperglycemic dose as follows-   Antihyperglycemics (From admission, onward)    Start     Stop Route Frequency Ordered    01/12/25 2100  insulin glargine U-100 (Lantus) pen 10 Units         -- SubQ Nightly 01/12/25 1441    01/10/25 1749  insulin aspart U-100 pen 0-10 Units         -- SubQ Before meals & nightly PRN 01/10/25 1650        Hold Oral hypoglycemics while patient is in the hospital.    1/12/25  Slightly elevated. She takes Toujeo daily. Will start Lantus 10 units nightly

## 2025-01-13 NOTE — ASSESSMENT & PLAN NOTE
Patient has Systolic (HFrEF) heart failure that is Chronic. On presentation their CHF was well compensated. Latest ECHO  Results for orders placed during the hospital encounter of 04/24/21    Echo Color Flow Doppler? Yes    Interpretation Summary  · Concentric hypertrophy and normal systolic function.  · The estimated ejection fraction is 55%.  · Indeterminate left ventricular diastolic function.  · With normal right ventricular systolic function.  · Moderate left atrial enlargement.    Current Heart Failure Medications  hydrALAZINE injection 10 mg, Every 6 hours PRN, Intravenous  carvediloL tablet 25 mg, 2 times daily, Oral    Plan  - Monitor strict I&Os and daily weights.    - Place on telemetry  - Low sodium diet  - Place on fluid restriction of 1.5 L.   - Cardiology has not been consulted  - The patient's volume status is at their baseline

## 2025-01-13 NOTE — PLAN OF CARE
Pt tolerated interventions fair. Required MOD A for bed mobility, ambulated 4 side steps MOD A using RW. Recommending low intensity therapy with 24/7 SPV and A upon d/c.

## 2025-01-13 NOTE — PLAN OF CARE
Discussed poc with pt, pt verbalized understanding    Purposeful rounding every 2hours    VS monitored as ordered    Blood glucose monitoring, coverage given    Fall precautions in place, remains injury free    Pain under control with PRN meds    IV saline locked    Bed locked at lowest position, spouse at bedside    Call light within reach    Chart check complete    Will cont with POC

## 2025-01-13 NOTE — ASSESSMENT & PLAN NOTE
Anemia is likely due to acute blood loss which was from orthopedic surgery . Most recent hemoglobin and hematocrit are listed below.  Recent Labs     01/11/25  0659 01/12/25  0713 01/13/25  0647   HGB 10.4* 8.6* 7.6*   HCT 33.0* 27.8* 23.6*     Plan  - Monitor serial CBC: Daily  - Transfuse PRBC if patient becomes hemodynamically unstable, symptomatic or H/H drops below 7/21.  - Patient has not received any PRBC transfusions to date received 1 unit packed blood cells 1/13  - Patient's anemia is currently worsening. Will transfused 1 unit packed blood cells  -

## 2025-01-13 NOTE — SUBJECTIVE & OBJECTIVE
Interval History:  Patient seen and examined at bedside.  Reports continued pain in her right leg.   at bedside.    Review of Systems   Constitutional:  Positive for activity change, appetite change and fatigue.   Respiratory:  Negative for shortness of breath.    Cardiovascular:  Negative for chest pain and leg swelling.   Gastrointestinal:  Positive for constipation. Negative for abdominal distention, nausea and vomiting.   Musculoskeletal:  Positive for arthralgias, gait problem, joint swelling and myalgias.   Skin:  Negative for color change.   Neurological:  Positive for weakness.   Psychiatric/Behavioral:  Positive for confusion. Negative for agitation.    All other systems reviewed and are negative.    Objective:     Vital Signs (Most Recent):  Temp: 97.1 °F (36.2 °C) (01/13/25 1705)  Pulse: 69 (01/13/25 1705)  Resp: 18 (01/13/25 1705)  BP: (!) 140/63 (01/13/25 1705)  SpO2: 97 % (01/13/25 1705) Vital Signs (24h Range):  Temp:  [97.1 °F (36.2 °C)-98.5 °F (36.9 °C)] 97.1 °F (36.2 °C)  Pulse:  [59-72] 69  Resp:  [15-18] 18  SpO2:  [95 %-99 %] 97 %  BP: (120-149)/(56-65) 140/63     Weight: 61.2 kg (135 lb)  Body mass index is 24.69 kg/m².    Intake/Output Summary (Last 24 hours) at 1/13/2025 1742  Last data filed at 1/13/2025 1705  Gross per 24 hour   Intake 1511.97 ml   Output 2 ml   Net 1509.97 ml         Physical Exam  Vitals and nursing note reviewed.   Constitutional:       General: She is not in acute distress.     Appearance: She is normal weight. She is ill-appearing.   HENT:      Head: Normocephalic and atraumatic.      Right Ear: Hearing and external ear normal.      Left Ear: Hearing and external ear normal.      Nose: No rhinorrhea.      Right Sinus: No maxillary sinus tenderness or frontal sinus tenderness.      Left Sinus: No maxillary sinus tenderness or frontal sinus tenderness.      Mouth/Throat:      Mouth: No oral lesions.      Pharynx: Uvula midline.   Eyes:      General:         Right  eye: No discharge.         Left eye: No discharge.      Conjunctiva/sclera: Conjunctivae normal.      Pupils: Pupils are equal, round, and reactive to light.   Neck:      Thyroid: No thyromegaly.      Vascular: No carotid bruit.      Trachea: No tracheal deviation.   Cardiovascular:      Rate and Rhythm: Normal rate and regular rhythm.      Heart sounds: S1 normal and S2 normal. Murmur heard.   Pulmonary:      Effort: Pulmonary effort is normal. No respiratory distress.      Breath sounds: Normal breath sounds.   Abdominal:      General: Bowel sounds are normal.      Palpations: Abdomen is soft. There is no mass.      Tenderness: There is no abdominal tenderness.   Musculoskeletal:         General: Normal range of motion.      Cervical back: Normal range of motion.      Right upper leg: No swelling or deformity.   Lymphadenopathy:      Cervical: No cervical adenopathy.      Upper Body:      Right upper body: No supraclavicular adenopathy.      Left upper body: No supraclavicular adenopathy.   Skin:     General: Skin is warm and dry.      Findings: No rash.      Comments: Right hip dressing in place. Dried serosanguinous drainage noted.   Neurological:      Mental Status: Mental status is at baseline.   Psychiatric:         Behavior: Behavior is slowed.             Significant Labs: All pertinent labs within the past 24 hours have been reviewed.  CBC:   Recent Labs   Lab 01/12/25  0713 01/13/25  0647   WBC 10.47 9.48   HGB 8.6* 7.6*   HCT 27.8* 23.6*    170     CMP:   Recent Labs   Lab 01/12/25  0712 01/13/25  0647    140   K 4.0 4.1    107   CO2 24 25   * 150*   BUN 17 14   CREATININE 0.8 0.7   CALCIUM 7.9* 8.1*   PROT 5.0* 4.7*   ALBUMIN 2.5* 2.3*   BILITOT 0.4 0.4   ALKPHOS 119 89   AST 79* 25   * 69*   ANIONGAP 7* 8       Significant Imaging: I have reviewed all pertinent imaging results/findings within the past 24 hours.

## 2025-01-13 NOTE — ASSESSMENT & PLAN NOTE
Patient's blood pressure range in the last 24 hours was: BP  Min: 120/56  Max: 149/62.The patient's inpatient anti-hypertensive regimen is listed below:  Current Antihypertensives  , , Oral  hydrALAZINE injection 10 mg, Every 6 hours PRN, Intravenous  amLODIPine tablet 10 mg, Daily, Oral  carvediloL tablet 25 mg, 2 times daily, Oral    Plan  - BP is controlled, no changes needed to their regimen

## 2025-01-13 NOTE — PT/OT/SLP PROGRESS
Occupational Therapy  Treatment    Emely Bush   MRN: [id number]   Admitting Diagnosis: Closed intertrochanteric fracture of right femur, initial encounter    OT Date of Treatment: 01/13/25   OT Start Time: 1505  OT Stop Time: 1535  OT Total Time (min): 30 min    Billable Minutes:  Self Care/Home Management 10 minutes and Therapeutic Activity 15 minutes    OT/LASHELL: OT     Number of LASHELL visits since last OT visit: 0    General Precautions: Standard, fall (Simultaneous filing. User may not have seen previous data.)  Orthopedic Precautions: RLE weight bearing as tolerated (Simultaneous filing. User may not have seen previous data.)  Braces: N/A (Simultaneous filing. User may not have seen previous data.)  Respiratory Status: Room air         Subjective:  Communicated with nurse Hernandez and [facility] chart review  prior to session. Pt reported feeling faint during sitting and standing activity    Pain/Comfort  Pain Rating 1: 0/10 (Simultaneous filing. User may not have seen previous data.)    Objective:  Patient found with: telemetry, peripheral IV, bed alarm, corado catheter (Simultaneous filing. User may not have seen previous data.)     Functional Mobility:  Bed Mobility:  Mod a wit supine<>sit with leg lift      Transfers:    Mod a with sit<>stand transfers    Functional Ambulation: mod a with side stepping r<l    Balance:   Static Sit: poor+  Dynamic Sit: POOR: N/A  Static Stand: POOR: Needs MODERATE assist to maintain  Dynamic stand: POOR: Needs MOD (moderate) assist during gait    Therapeutic Activities and Exercises:  Pt seen getting blood per nurse Chang and nurse Lexus reported okay. Educated patient on importance of increased tolerance to upright position and direct impact on CV endurance and strength. Patient encouraged to sit up in chair/ EOB, for a minimum of 2 consecutive hours including for all meals.. Encouraged patient to perform AROM TE to BUE throughout the day within all available planes of  "motion. Re enforced importance of utilizing call light to meet needs in room and not attempt to get up without staff assistance. Patient verbalized understanding and agreed to comply.           AM-PAC 6 CLICK ADL   How much help from another person does this patient currently need?   1 = Unable, Total/Dependent Assistance  2 = A lot, Maximum/Moderate Assistance  3 = A little, Minimum/Contact Guard/Supervision  4 = None, Modified [place]/Independent    Putting on and taking off regular lower body clothing? : 2  Bathing (including washing, rinsing, drying)?: 2  Toileting, which includes using toilet, bedpan, or urinal? : 2  Putting on and taking off regular upper body clothing?: 3  Taking care of personal grooming such as brushing teeth?: 3  Eating meals?: 4  Daily Activity Total Score: 16     AM-PAC Raw Score CMS "G-Code Modifier Level of Impairment Assistance   6 % Total / Unable   7 - 8 CM 80 - 100% Maximal Assist   9-13 CL 60 - 80% Moderate Assist   14 - 19 CK 40 - 60% Moderate Assist   20 - 22 CJ 20 - 40% Minimal Assist   23 CI 1-20% SBA / CGA   24 CH 0% Independent/ Mod I       Patient left with bed in chair position with all lines intact, call button in reach, bed alarm on, and nurse present    ASSESSMENT:  Emely Bush is a 79 y.o. female with a medical diagnosis of Closed intertrochanteric fracture of right femur, initial encounter and presents with debility and generalized weakness.    Rehab identified problem list/impairments:  weakness, impaired functional mobility, decreased safety awareness, impaired endurance, gait instability, impaired balance, impaired self care skills, decreased lower extremity function, decreased upper extremity function (Simultaneous filing. User may not have seen previous data.)    Rehab potential is good.    Activity tolerance: Good    Discharge recommendations: Low Intensity Therapy (Simultaneous filing. User may not have seen previous data.)   Barriers to " discharge: Barriers to Discharge: None    Equipment recommendations: walker, rolling, shower chair (Simultaneous filing. User may not have seen previous data.)    GOALS:   Multidisciplinary Problems       Occupational Therapy Goals          Problem: Occupational Therapy    Goal Priority Disciplines Outcome Interventions   Occupational Therapy Goal     OT, PT/OT Progressing    Description: Goals to be met by: 1/26/25     Patient will increase functional independence with ADLs by performing:    Toileting from bedside commode with Stand-by Assistance for hygiene and clothing management.   Toilet transfer to bedside commode with Stand-by Assistance.  Increased functional strength in B UE grossly by 1/2 MM grade.                         Plan:  Patient to be seen 2 x/week to address the above listed problems via self-care/home management, therapeutic activities, therapeutic exercises  Plan of Care expires: 01/26/25  Plan of Care reviewed with: patient, spouse (Simultaneous filing. User may not have seen previous data.)         01/13/2025

## 2025-01-13 NOTE — PLAN OF CARE
01/13/25 1356   Rounds   Attendance Provider;;Charge nurse;Physical therapist   Discharge Plan A Skilled Nursing Facility   Why the patient remains in the hospital Other (see comment)  (pending insurance auth for SNF)   Transition of Care Barriers None       Assessment/Plan:            Active Diagnoses:     Diagnosis Date Noted POA    PRINCIPAL PROBLEM:  Closed intertrochanteric fracture of right femur, initial encounter [S72.141A] 01/10/2025 Yes    Primary hypertension [I10] 01/10/2025 Yes    Osteopenia after menopause [M85.80, Z78.0] 06/02/2021 Not Applicable    Chronic systolic heart failure [I50.22] 03/08/2016 Yes    Controlled type 2 diabetes with neuropathy [E11.40] 03/26/2013 Yes    Hypothyroidism [E03.9] 03/26/2013 Yes       Problems Resolved During this Admission:         Assessment:  79 y.o. female postop day 2 status post operative repair of right intertrochanteric femur fracture with cephalomedullary nail     Plan:  Weightbearing as tolerated to the right lower extremity   PT/OT for gait training and ADLs   Rolling walker for ambulation   DVT prophylaxis per primary team  Dressings were changed today, leave these in place until clinic follow up or change them if they become saturated or begin to peel  Patient is ready for discharge from orthopedic standpoint once arrangements have been made   We will see the patient back in Ortho Trauma Clinic at 3 weeks postop

## 2025-01-13 NOTE — PT/OT/SLP PROGRESS
Physical Therapy Treatment    Patient Name:  Emely Bush   MRN:  [id number]    Recommendations:     Discharge Recommendations: Low Intensity Therapy (with 24/7 SPV and A)  Discharge Equipment Recommendations: walker, rolling, shower chair  Barriers to discharge: None    Assessment:     Emely Bush is a 79 y.o. female admitted with a medical diagnosis of Closed intertrochanteric fracture of right femur, initial encounter.  She presents with the following impairments/functional limitations: weakness, impaired endurance, impaired functional mobility, gait instability, impaired balance, pain, decreased safety awareness, decreased lower extremity function, decreased ROM, orthopedic precautions, edema, impaired cardiopulmonary response to activity.    Rehab Prognosis: Good; patient would benefit from acute skilled PT services to address these deficits and reach maximum level of function.    Recent Surgery: Procedure(s) (LRB):  INSERTION, INTRAMEDULLARY FÁTIMA, FEMUR, PROXIMAL (Right) 2 Days Post-Op    Plan:     During this hospitalization, patient to be seen 3 x/week to address the identified rehab impairments via gait training, therapeutic activities, therapeutic exercises and progress toward the following goals:    Plan of Care Expires:  01/26/25    Subjective     Chief Complaint: Pt is motivated to participate  Patient/Family Comments/goals: none stated  Pain/Comfort:  Pain Rating 1: 5/10  Location - Side 1: Right  Location - Orientation 1: generalized  Location 1: hip  Pain Addressed 1: Reposition, Distraction  Pain Rating Post-Intervention 1: 5/10      Objective:     Pt receiving blood transfusion. Communicated with nurse Beaz and epic chart review prior to session, nurse cleared pt for OOB activity.  Patient found HOB elevated with oxygen, peripheral IV, bed alarm, telemetry (blood transfusion) upon PT entry to room.     General Precautions: Standard, fall  Orthopedic Precautions: RLE weight bearing as  "tolerated  Braces: N/A  Respiratory Status: Nasal cannula, flow 2 L/min     Functional Mobility:  Gait Belt Applied - Yes   Socks/Shoes Donned - Yes  Bed Mobility  Rolling Left: moderate assistance  Scooting: moderate assistance  Supine to Sit: moderate assistance for LE management and trunk management  Sit to Supine: moderate assistance for LE management and trunk management  Transfers  Sit to Stand: moderate assistance with rolling walker  Bed to Chair: not progressed due to pt reporting "I feel faint" when standing  Gait  Patient ambulated 4 side steps to L with rolling walker and moderate assistance. Patient demonstrates unsteady gait. Pt reported "feeling faint" when standing, improved with time when sitting. Mild SOB, educated about pursed lip breathing technique and cued for use with mobility. All lines remained intact throughout ambulation trial, portable Supplemental O2 2L utilized.  Balance  Sitting: contact guard assistance  Standing: moderate assistance    AM-PAC 6 CLICK MOBILITY  Turning over in bed (including adjusting bedclothes, sheets and blankets)?: 2  Sitting down on and standing up from a chair with arms (e.g., wheelchair, bedside commode, etc.): 2  Moving from lying on back to sitting on the side of the bed?: 2  Moving to and from a bed to a chair (including a wheelchair)?: 1 (NT)  Need to walk in hospital room?: 1 (NT)  Climbing 3-5 steps with a railing?: 1 (NT)  Basic Mobility Total Score: 9       Treatment & Education:  Reviewed role of PT in acute care and POC. Educated on R LE WBAT. Pt tolerated interventions fair. Reviewed importance of OOB activities, activity pacing, and HEP (marching/hip flex, hip abd, heel slides/LAQ, quad sets, ankle pumps) in order to maintain/regain strength. Encouraged to sit up for all meals. Reviewed proper use of RW for safety and to reduce risk of falling. Reviewed "call don't fall" policy and increased risk of falling due to weakness, instructed to utilize " call bell for assistance with all transfers. Pt agreeable to all requests.    Patient left with bed in chair position with all lines intact, call button in reach, bed alarm on, and nurse notified..    GOALS:   Multidisciplinary Problems       Physical Therapy Goals          Problem: Physical Therapy    Goal Priority Disciplines Outcome Interventions   Physical Therapy Goal     PT, PT/OT Progressing    Description: LTG'S TO BE MET IN 14 DAYS (1-26-25)  PT WILL REQUIRE SPV FOR BED MOBILITY  PT WILL REQUIRE SBA FOR BED<>CHAIR TF'S  PT WILL  FEET WITH RW AND SBA  PT WILL INC AMPAC SCORE BY 2 POINTS TO PROGRESS GROSS FUNC MOBILITY                         Time Tracking:     PT Received On: 01/13/25  PT Start Time: 1510     PT Stop Time: 1535  PT Total Time (min): 25 min     Billable Minutes: Therapeutic Activity 25min    Treatment Type: Treatment  PT/PTA: PT     Number of PTA visits since last PT visit: 0     01/13/2025

## 2025-01-13 NOTE — PROGRESS NOTES
[facility]  Progress Note    Patient Name: Emely Bush  MRN: [id number]  Patient Class: IP- Inpatient   Admission Date: 1/10/2025  Length of Stay: 3 days  Attending Physician: Marya Wadsworth MD  Primary Care Provider: Dara Talley MD        Subjective     Principal Problem:Closed intertrochanteric fracture of right femur, initial encounter        HPI:  79 y.o. female, comorbid conditions include dementia, DM, HTN, and CAD.  Presented to the ED for evaluation of R hip pain and swelling which onset after falling this morning when she got up to use the restroom. Denies hitting her head or loss of consciousness. Associated sxs include R leg pain. Patient denies any fever, HA, n/v, and all other sxs at this time.  states that she normally ambulates well without assistance, but has recently been dx with early dementia and has had some trouble getting around. Takes ASA 81mg daily. In the ED, vitals: 137/56, 67, 18, 98.1F, 99% RA on arrival. Significant labs: Glu: 168. Xray Femur: Comminuted, displaced and angulated right intertrochanteric fracture. Orthopedic consulted in ED. Treated with IV fluids, pain medication, anti-emetic. Patient is a full code. Admitted to Hospital Medicine for management of Right Hip Fracture.     Overview/Hospital Course:  Emely Bush is a 79 year old female who was admitted for closed nondisplaced intertrochanteric fracture of right femur. She underwent right femur intramedullary alok insertion on 1/11/25 by Dr. Mclean. PT/TO has been ordered postoperatively.     1/12/25  Patient is s/p femur alok insertion. Seen by PT/TO.    Patient is still requiring significant help to merely get out of bed.  Hemoglobin down to 7.6 transfuse 1 unit of packed blood cells    Interval History:  Patient seen and examined at bedside.  Reports continued pain in her right leg.   at bedside.    Review of Systems   Constitutional:  Positive for  activity change, appetite change and fatigue.   Respiratory:  Negative for shortness of breath.    Cardiovascular:  Negative for chest pain and leg swelling.   Gastrointestinal:  Positive for constipation. Negative for abdominal distention, nausea and vomiting.   Musculoskeletal:  Positive for arthralgias, gait problem, joint swelling and myalgias.   Skin:  Negative for color change.   Neurological:  Positive for weakness.   Psychiatric/Behavioral:  Positive for confusion. Negative for agitation.    All other systems reviewed and are negative.    Objective:     Vital Signs (Most Recent):  Temp: 97.1 °F (36.2 °C) (01/13/25 1705)  Pulse: 69 (01/13/25 1705)  Resp: 18 (01/13/25 1705)  BP: (!) 140/63 (01/13/25 1705)  SpO2: 97 % (01/13/25 1705) Vital Signs (24h Range):  Temp:  [97.1 °F (36.2 °C)-98.5 °F (36.9 °C)] 97.1 °F (36.2 °C)  Pulse:  [59-72] 69  Resp:  [15-18] 18  SpO2:  [95 %-99 %] 97 %  BP: (120-149)/(56-65) 140/63     Weight: 61.2 kg (135 lb)  Body mass index is 24.69 kg/m².    Intake/Output Summary (Last 24 hours) at 1/13/2025 1742  Last data filed at 1/13/2025 1705  Gross per 24 hour   Intake 1511.97 ml   Output 2 ml   Net 1509.97 ml         Physical Exam  Vitals and nursing note reviewed.   Constitutional:       General: She is not in acute distress.     Appearance: She is normal weight. She is ill-appearing.   HENT:      Head: Normocephalic and atraumatic.      Right Ear: Hearing and external ear normal.      Left Ear: Hearing and external ear normal.      Nose: No rhinorrhea.      Right Sinus: No maxillary sinus tenderness or frontal sinus tenderness.      Left Sinus: No maxillary sinus tenderness or frontal sinus tenderness.      Mouth/Throat:      Mouth: No oral lesions.      Pharynx: Uvula midline.   Eyes:      General:         Right eye: No discharge.         Left eye: No discharge.      Conjunctiva/sclera: Conjunctivae normal.      Pupils: Pupils are equal, round, and reactive to light.   Neck:       Thyroid: No thyromegaly.      Vascular: No carotid bruit.      Trachea: No tracheal deviation.   Cardiovascular:      Rate and Rhythm: Normal rate and regular rhythm.      Heart sounds: S1 normal and S2 normal. Murmur heard.   Pulmonary:      Effort: Pulmonary effort is normal. No respiratory distress.      Breath sounds: Normal breath sounds.   Abdominal:      General: Bowel sounds are normal.      Palpations: Abdomen is soft. There is no mass.      Tenderness: There is no abdominal tenderness.   Musculoskeletal:         General: Normal range of motion.      Cervical back: Normal range of motion.      Right upper leg: No swelling or deformity.   Lymphadenopathy:      Cervical: No cervical adenopathy.      Upper Body:      Right upper body: No supraclavicular adenopathy.      Left upper body: No supraclavicular adenopathy.   Skin:     General: Skin is warm and dry.      Findings: No rash.      Comments: Right hip dressing in place. Dried serosanguinous drainage noted.   Neurological:      Mental Status: Mental status is at baseline.   Psychiatric:         Behavior: Behavior is slowed.             Significant Labs: All pertinent labs within the past 24 hours have been reviewed.  CBC:   Recent Labs   Lab 01/12/25  0713 01/13/25  0647   WBC 10.47 9.48   HGB 8.6* 7.6*   HCT 27.8* 23.6*    170     CMP:   Recent Labs   Lab 01/12/25  0712 01/13/25  0647    140   K 4.0 4.1    107   CO2 24 25   * 150*   BUN 17 14   CREATININE 0.8 0.7   CALCIUM 7.9* 8.1*   PROT 5.0* 4.7*   ALBUMIN 2.5* 2.3*   BILITOT 0.4 0.4   ALKPHOS 119 89   AST 79* 25   * 69*   ANIONGAP 7* 8       Significant Imaging: I have reviewed all pertinent imaging results/findings within the past 24 hours.    Assessment and Plan     * Closed intertrochanteric fracture of right femur, initial encounter   Xray: Comminuted, displaced and angulated right intertrochanteric fracture. Mid right thigh swelling. Pedal pulse: 2+ on initial  evaluation    --Monitor NVI of right leg q4h  --Serial H/H  --Type and screen  --tele  --vitals per protocol  --NPO after midnight  --Orthopedic surgery consulted, planned for surgery in AM    1/11/25  Plans for right femur intramedullary alok insertion today.   Will order PT/TO postoperatively    1/12/25  Hemoglobin down almost 2 grams POD#1. Repeat in AM. May require transfusion  PT/OT seen Continue therapy and pain management    Start DVT prophylaxis      Anemia  Anemia is likely due to acute blood loss which was from orthopedic surgery . Most recent hemoglobin and hematocrit are listed below.  Recent Labs     01/11/25  0659 01/12/25  0713 01/13/25  0647   HGB 10.4* 8.6* 7.6*   HCT 33.0* 27.8* 23.6*     Plan  - Monitor serial CBC: Daily  - Transfuse PRBC if patient becomes hemodynamically unstable, symptomatic or H/H drops below 7/21.  - Patient has not received any PRBC transfusions to date received 1 unit packed blood cells 1/13  - Patient's anemia is currently worsening. Will transfused 1 unit packed blood cells  -     Primary hypertension  Patient's blood pressure range in the last 24 hours was: BP  Min: 120/56  Max: 149/62.The patient's inpatient anti-hypertensive regimen is listed below:  Current Antihypertensives  , , Oral  hydrALAZINE injection 10 mg, Every 6 hours PRN, Intravenous  amLODIPine tablet 10 mg, Daily, Oral  carvediloL tablet 25 mg, 2 times daily, Oral    Plan  - BP is controlled, no changes needed to their regimen      Osteopenia after menopause  Managed with biphosphonates      Hypothyroidism  --cont home med      Chronic systolic heart failure    Patient has Systolic (HFrEF) heart failure that is Chronic. On presentation their CHF was well compensated. Latest ECHO  Results for orders placed during the hospital encounter of 04/24/21    Echo Color Flow Doppler? Yes    Interpretation Summary  · Concentric hypertrophy and normal systolic function.  · The estimated ejection fraction is 55%.  ·  Indeterminate left ventricular diastolic function.  · With normal right ventricular systolic function.  · Moderate left atrial enlargement.    Current Heart Failure Medications  hydrALAZINE injection 10 mg, Every 6 hours PRN, Intravenous  carvediloL tablet 25 mg, 2 times daily, Oral    Plan  - Monitor strict I&Os and daily weights.    - Place on telemetry  - Low sodium diet  - Place on fluid restriction of 1.5 L.   - Cardiology has not been consulted  - The patient's volume status is at their baseline           Controlled type 2 diabetes with neuropathy  Patient's FSGs are controlled on current medication regimen.  Last A1c reviewed-   Lab Results   Component Value Date    HGBA1C 7.3 (H) 04/24/2021     Most recent fingerstick glucose reviewed-   Recent Labs   Lab 01/12/25  2150 01/13/25  0609 01/13/25  1139 01/13/25  1650   POCTGLUCOSE 247* 146* 185* 184*       Current correctional scale  Medium  Maintain anti-hyperglycemic dose as follows-   Antihyperglycemics (From admission, onward)      Start     Stop Route Frequency Ordered    01/12/25 2100  insulin glargine U-100 (Lantus) pen 10 Units         -- SubQ Nightly 01/12/25 1441    01/10/25 1749  insulin aspart U-100 pen 0-10 Units         -- SubQ Before meals & nightly PRN 01/10/25 1650          Hold Oral hypoglycemics while patient is in the hospital.    1/12/25  Slightly elevated. She takes Toujeo daily. Will start Lantus 10 units nightly      VTE Risk Mitigation (From admission, onward)           Ordered     enoxaparin injection 40 mg  Every 24 hours         01/13/25 1748     IP VTE HIGH RISK PATIENT  Once         01/10/25 1650     Place sequential compression device  Until discontinued         01/10/25 1650                    Discharge Planning   EMMETT: 1/14/2025     Code Status: Full Code   Medical Readiness for Discharge Date:   Discharge Plan A: Skilled Nursing Facility   Discharge Delays: None known at this time            Please place Justification for  DILSHAD Jackson MD  Department of Hospital Medicine   [facility]

## 2025-01-14 LAB
ALBUMIN SERPL BCP-MCNC: 2.3 G/DL (ref 3.5–5.2)
ANION GAP SERPL CALC-SCNC: 8 MMOL/L (ref 8–16)
BASOPHILS # BLD AUTO: 0.03 K/UL (ref 0–0.2)
BASOPHILS NFR BLD: 0.4 % (ref 0–1.9)
BUN SERPL-MCNC: 9 MG/DL (ref 8–23)
CALCIUM SERPL-MCNC: 8.2 MG/DL (ref 8.7–10.5)
CHLORIDE SERPL-SCNC: 101 MMOL/L (ref 95–110)
CO2 SERPL-SCNC: 28 MMOL/L (ref 23–29)
CREAT SERPL-MCNC: 0.6 MG/DL (ref 0.5–1.4)
DIFFERENTIAL METHOD BLD: ABNORMAL
EOSINOPHIL # BLD AUTO: 0.2 K/UL (ref 0–0.5)
EOSINOPHIL NFR BLD: 2.1 % (ref 0–8)
ERYTHROCYTE [DISTWIDTH] IN BLOOD BY AUTOMATED COUNT: 14.1 % (ref 11.5–14.5)
EST. GFR  (NO RACE VARIABLE): >60 ML/MIN/1.73 M^2
GLUCOSE SERPL-MCNC: 104 MG/DL (ref 70–110)
HCT VFR BLD AUTO: 29.8 % (ref 37–48.5)
HGB BLD-MCNC: 9.7 G/DL (ref 12–16)
IMM GRANULOCYTES # BLD AUTO: 0.03 K/UL (ref 0–0.04)
IMM GRANULOCYTES NFR BLD AUTO: 0.4 % (ref 0–0.5)
LYMPHOCYTES # BLD AUTO: 2.2 K/UL (ref 1–4.8)
LYMPHOCYTES NFR BLD: 27.6 % (ref 18–48)
MCH RBC QN AUTO: 28.3 PG (ref 27–31)
MCHC RBC AUTO-ENTMCNC: 32.6 G/DL (ref 32–36)
MCV RBC AUTO: 87 FL (ref 82–98)
MONOCYTES # BLD AUTO: 0.7 K/UL (ref 0.3–1)
MONOCYTES NFR BLD: 8.4 % (ref 4–15)
NEUTROPHILS # BLD AUTO: 4.9 K/UL (ref 1.8–7.7)
NEUTROPHILS NFR BLD: 61.1 % (ref 38–73)
NRBC BLD-RTO: 0 /100 WBC
PHOSPHATE SERPL-MCNC: 3.5 MG/DL (ref 2.7–4.5)
PLATELET # BLD AUTO: 181 K/UL (ref 150–450)
PMV BLD AUTO: 10.2 FL (ref 9.2–12.9)
POCT GLUCOSE: 106 MG/DL (ref 70–110)
POCT GLUCOSE: 134 MG/DL (ref 70–110)
POCT GLUCOSE: 168 MG/DL (ref 70–110)
POCT GLUCOSE: 266 MG/DL (ref 70–110)
POTASSIUM SERPL-SCNC: 3.9 MMOL/L (ref 3.5–5.1)
RBC # BLD AUTO: 3.43 M/UL (ref 4–5.4)
SODIUM SERPL-SCNC: 137 MMOL/L (ref 136–145)
WBC # BLD AUTO: 8.02 K/UL (ref 3.9–12.7)

## 2025-01-14 PROCEDURE — 25000003 PHARM REV CODE 250: Performed by: FAMILY MEDICINE

## 2025-01-14 PROCEDURE — 63600175 PHARM REV CODE 636 W HCPCS: Performed by: INTERNAL MEDICINE

## 2025-01-14 PROCEDURE — 63600175 PHARM REV CODE 636 W HCPCS: Performed by: NURSE PRACTITIONER

## 2025-01-14 PROCEDURE — 85025 COMPLETE CBC W/AUTO DIFF WBC: CPT | Performed by: INTERNAL MEDICINE

## 2025-01-14 PROCEDURE — 99900035 HC TECH TIME PER 15 MIN (STAT)

## 2025-01-14 PROCEDURE — 99024 POSTOP FOLLOW-UP VISIT: CPT | Mod: ,,, | Performed by: PHYSICIAN ASSISTANT

## 2025-01-14 PROCEDURE — 11000001 HC ACUTE MED/SURG PRIVATE ROOM

## 2025-01-14 PROCEDURE — 97530 THERAPEUTIC ACTIVITIES: CPT

## 2025-01-14 PROCEDURE — 25000003 PHARM REV CODE 250: Performed by: HOSPITALIST

## 2025-01-14 PROCEDURE — 36415 COLL VENOUS BLD VENIPUNCTURE: CPT | Performed by: INTERNAL MEDICINE

## 2025-01-14 PROCEDURE — 25000003 PHARM REV CODE 250: Performed by: ORTHOPAEDIC SURGERY

## 2025-01-14 PROCEDURE — 80069 RENAL FUNCTION PANEL: CPT | Performed by: INTERNAL MEDICINE

## 2025-01-14 PROCEDURE — 25000003 PHARM REV CODE 250: Performed by: INTERNAL MEDICINE

## 2025-01-14 PROCEDURE — 97535 SELF CARE MNGMENT TRAINING: CPT

## 2025-01-14 RX ORDER — HYDROCODONE BITARTRATE AND ACETAMINOPHEN 5; 325 MG/1; MG/1
1 TABLET ORAL EVERY 6 HOURS PRN
Qty: 10 TABLET | Refills: 0 | Status: SHIPPED | OUTPATIENT
Start: 2025-01-14

## 2025-01-14 RX ORDER — INSULIN ASPART 100 [IU]/ML
0-10 INJECTION, SOLUTION INTRAVENOUS; SUBCUTANEOUS
Start: 2025-01-14 | End: 2026-01-14

## 2025-01-14 RX ORDER — ENOXAPARIN SODIUM 100 MG/ML
40 INJECTION SUBCUTANEOUS EVERY 24 HOURS
Start: 2025-01-14 | End: 2025-02-13

## 2025-01-14 RX ORDER — TALC
6 POWDER (GRAM) TOPICAL NIGHTLY PRN
Start: 2025-01-14

## 2025-01-14 RX ORDER — ACETAMINOPHEN 325 MG/1
650 TABLET ORAL EVERY 4 HOURS PRN
Start: 2025-01-14

## 2025-01-14 RX ORDER — INSULIN GLARGINE 300 [IU]/ML
20 INJECTION, SOLUTION SUBCUTANEOUS DAILY
Start: 2025-01-14

## 2025-01-14 RX ORDER — CARVEDILOL 25 MG/1
25 TABLET ORAL 2 TIMES DAILY
Start: 2025-01-14 | End: 2025-02-13

## 2025-01-14 RX ORDER — SYRING-NEEDL,DISP,INSUL,0.3 ML 29 G X1/2"
296 SYRINGE, EMPTY DISPOSABLE MISCELLANEOUS ONCE
Status: COMPLETED | OUTPATIENT
Start: 2025-01-14 | End: 2025-01-14

## 2025-01-14 RX ADMIN — INSULIN ASPART 3 UNITS: 100 INJECTION, SOLUTION INTRAVENOUS; SUBCUTANEOUS at 09:01

## 2025-01-14 RX ADMIN — MELATONIN TAB 3 MG 6 MG: 3 TAB at 09:01

## 2025-01-14 RX ADMIN — SENNOSIDES AND DOCUSATE SODIUM 1 TABLET: 50; 8.6 TABLET ORAL at 09:01

## 2025-01-14 RX ADMIN — MAGNESIUM CITRATE 296 ML: 1.75 LIQUID ORAL at 05:01

## 2025-01-14 RX ADMIN — PANTOPRAZOLE SODIUM 40 MG: 40 TABLET, DELAYED RELEASE ORAL at 09:01

## 2025-01-14 RX ADMIN — Medication 1 ENEMA: at 01:01

## 2025-01-14 RX ADMIN — HYDROCODONE BITARTRATE AND ACETAMINOPHEN 1 TABLET: 5; 325 TABLET ORAL at 09:01

## 2025-01-14 RX ADMIN — AMLODIPINE BESYLATE 10 MG: 10 TABLET ORAL at 09:01

## 2025-01-14 RX ADMIN — INSULIN GLARGINE 10 UNITS: 100 INJECTION, SOLUTION SUBCUTANEOUS at 09:01

## 2025-01-14 RX ADMIN — HYDROCODONE BITARTRATE AND ACETAMINOPHEN 1 TABLET: 5; 325 TABLET ORAL at 03:01

## 2025-01-14 RX ADMIN — CARVEDILOL 25 MG: 12.5 TABLET, FILM COATED ORAL at 09:01

## 2025-01-14 RX ADMIN — Medication 100 MG: at 09:01

## 2025-01-14 RX ADMIN — CHLORHEXIDINE GLUCONATE 0.12% ORAL RINSE 10 ML: 1.2 LIQUID ORAL at 09:01

## 2025-01-14 RX ADMIN — DONEPEZIL HYDROCHLORIDE 10 MG: 5 TABLET, FILM COATED ORAL at 09:01

## 2025-01-14 RX ADMIN — INSULIN ASPART 2 UNITS: 100 INJECTION, SOLUTION INTRAVENOUS; SUBCUTANEOUS at 05:01

## 2025-01-14 RX ADMIN — IRON SUCROSE 200 MG: 20 INJECTION, SOLUTION INTRAVENOUS at 10:01

## 2025-01-14 RX ADMIN — POLYETHYLENE GLYCOL 3350 17 G: 17 POWDER, FOR SOLUTION ORAL at 09:01

## 2025-01-14 RX ADMIN — FLUOXETINE HYDROCHLORIDE 40 MG: 20 CAPSULE ORAL at 09:01

## 2025-01-14 RX ADMIN — LEVOTHYROXINE SODIUM 100 MCG: 100 TABLET ORAL at 06:01

## 2025-01-14 RX ADMIN — ATORVASTATIN CALCIUM 20 MG: 10 TABLET, FILM COATED ORAL at 09:01

## 2025-01-14 RX ADMIN — ENOXAPARIN SODIUM 40 MG: 40 INJECTION SUBCUTANEOUS at 04:01

## 2025-01-14 NOTE — DISCHARGE SUMMARY
[facility] Medicine  Discharge Summary      Patient Name: Emely Bush  MRN: [id number]  [facility]: [id number]  Patient Class: IP- Inpatient  Admission Date: 1/10/2025  Hospital Length of Stay: 4 days  Discharge Date and Time:  01/14/2025 10:54 AM  Attending Physician: Marya Wadsworth MD   Discharging Provider: Marya Wadsworth MD  Primary Care Provider: Dara Talley MD    Primary Care Team: Networked reference to record PCT     HPI:   79 y.o. female, comorbid conditions include dementia, DM, HTN, and CAD.  Presented to the ED for evaluation of R hip pain and swelling which onset after falling this morning when she got up to use the restroom. Denies hitting her head or loss of consciousness. Associated sxs include R leg pain. Patient denies any fever, HA, n/v, and all other sxs at this time.  states that she normally ambulates well without assistance, but has recently been dx with early dementia and has had some trouble getting around. Takes ASA 81mg daily. In the ED, vitals: 137/56, 67, 18, 98.1F, 99% RA on arrival. Significant labs: Glu: 168. Xray Femur: Comminuted, displaced and angulated right intertrochanteric fracture. Orthopedic consulted in ED. Treated with IV fluids, pain medication, anti-emetic. Patient is a full code. Admitted to Hospital Medicine for management of Right Hip Fracture.     Procedure(s) (LRB):  INSERTION, INTRAMEDULLARY FÁTIMA, FEMUR, PROXIMAL (Right)      Hospital Course:   Emely Bush is a 79 year old female who was admitted for closed nondisplaced intertrochanteric fracture of right femur. She underwent right femur intramedullary fátima insertion on 1/11/25 by Dr. Mclean. PT/TO has been ordered postoperatively.     1/12/25  Patient is s/p femur fátima insertion. Seen by PT/TO.    Patient is still requiring significant help to merely get out of bed.  Hemoglobin down to 7.6 transfused 1 unit of packed blood cells with improvement in hemoglobin to  9.7.  Iron saturation at 7% we will need p.o. iron.  Glucose under fair control.  Patient seen and examined on day of discharge and stable for discharge to skilled nursing.  She is weight-bearing as tolerated to her right lower extremity.  She is to use rolling walker for ambulation.  DVT prophylaxis of Lovenox until seen in follow up by ortho Trauma in 3 weeks.  She is to change dressing as needed and leave incisions covered until followed up with clinic.     Goals of Care Treatment Preferences:  Code Status: Full Code      SDOH Screening:  The patient was screened for utility difficulties, food insecurity, transport difficulties, housing insecurity, and interpersonal safety and there were no concerns identified this admission.     Consults:   Consults (From admission, onward)          Status Ordering Provider     Inpatient consult to Social Work  Once        Provider:  (Not yet assigned)    Completed LESLIE FERNÁNDEZ     Inpatient consult to Hospitalist  Once        Provider:  Layne Lerma NP    Acknowledged SAMI MCLEAN     IP consult case management/social work  Once        Provider:  (Not yet assigned)    Completed SAMI MCLEAN     Inpatient consult to Orthopedic Surgery  Once        Provider:  Sami Mclean MD    Completed SHAWN NOEL            * Closed intertrochanteric fracture of right femur, initial encounter   Xray: Comminuted, displaced and angulated right intertrochanteric fracture. Mid right thigh swelling. Pedal pulse: 2+ on initial evaluation    --Monitor NVI of right leg q4h  --Serial H/H  --Type and screen  --tele  --vitals per protocol  --NPO after midnight  --Orthopedic surgery consulted, planned for surgery in AM    1/11/25  Plans for right femur intramedullary alok insertion today.   Will order PT/TO postoperatively    1/12/25  Hemoglobin down almost 2 grams POD#1. Repeat in AM. May require transfusion  PT/OT seen Continue therapy and pain  management    Start DVT prophylaxis      Anemia  Anemia is likely due to acute blood loss which was from orthopedic surgery . Most recent hemoglobin and hematocrit are listed below.  Recent Labs     01/11/25  0659 01/12/25  0713 01/13/25  0647   HGB 10.4* 8.6* 7.6*   HCT 33.0* 27.8* 23.6*     Plan  - Monitor serial CBC: Daily  - Transfuse PRBC if patient becomes hemodynamically unstable, symptomatic or H/H drops below 7/21.  - Patient has not received any PRBC transfusions to date received 1 unit packed blood cells 1/13  - Patient's anemia is currently worsening. Will transfused 1 unit packed blood cells  -     Primary hypertension  Patient's blood pressure range in the last 24 hours was: BP  Min: 120/56  Max: 149/62.The patient's inpatient anti-hypertensive regimen is listed below:  Current Antihypertensives  , , Oral  hydrALAZINE injection 10 mg, Every 6 hours PRN, Intravenous  amLODIPine tablet 10 mg, Daily, Oral  carvediloL tablet 25 mg, 2 times daily, Oral    Plan  - BP is controlled, no changes needed to their regimen      Osteopenia after menopause  Managed with biphosphonates      Hypothyroidism  --cont home med      Chronic systolic heart failure    Patient has Systolic (HFrEF) heart failure that is Chronic. On presentation their CHF was well compensated. Latest ECHO  Results for orders placed during the hospital encounter of 04/24/21    Echo Color Flow Doppler? Yes    Interpretation Summary  · Concentric hypertrophy and normal systolic function.  · The estimated ejection fraction is 55%.  · Indeterminate left ventricular diastolic function.  · With normal right ventricular systolic function.  · Moderate left atrial enlargement.    Current Heart Failure Medications  hydrALAZINE injection 10 mg, Every 6 hours PRN, Intravenous  carvediloL tablet 25 mg, 2 times daily, Oral    Plan  - Monitor strict I&Os and daily weights.    - Place on telemetry  - Low sodium diet  - Place on fluid restriction of 1.5 L.   -  Cardiology has not been consulted  - The patient's volume status is at their baseline           Controlled type 2 diabetes with neuropathy  Patient's FSGs are controlled on current medication regimen.  Last A1c reviewed-   Lab Results   Component Value Date    HGBA1C 7.3 (H) 04/24/2021     Most recent fingerstick glucose reviewed-   Recent Labs   Lab 01/12/25  2150 01/13/25  0609 01/13/25  1139 01/13/25  1650   POCTGLUCOSE 247* 146* 185* 184*       Current correctional scale  Medium  Maintain anti-hyperglycemic dose as follows-   Antihyperglycemics (From admission, onward)      Start     Stop Route Frequency Ordered    01/12/25 2100  insulin glargine U-100 (Lantus) pen 10 Units         -- SubQ Nightly 01/12/25 1441    01/10/25 1749  insulin aspart U-100 pen 0-10 Units         -- SubQ Before meals & nightly PRN 01/10/25 1650          Hold Oral hypoglycemics while patient is in the hospital.    1/12/25  Slightly elevated. She takes Toujeo daily. Will start Lantus 10 units nightly      Final Active Diagnoses:    Diagnosis Date Noted POA    PRINCIPAL PROBLEM:  Closed intertrochanteric fracture of right femur, initial encounter [S72.141A] 01/10/2025 Yes    Anemia [D64.9] 01/13/2025 Yes    Primary hypertension [I10] 01/10/2025 Yes    Osteopenia after menopause [M85.80, Z78.0] 06/02/2021 Not Applicable    Chronic systolic heart failure [I50.22] 03/08/2016 Yes    Controlled type 2 diabetes with neuropathy [E11.40] 03/26/2013 Yes    Hypothyroidism [E03.9] 03/26/2013 Yes      Problems Resolved During this Admission:       Discharged Condition: fair    Disposition: Skilled Nursing Facility    Follow Up:   Contact information for follow-up providers       Dara Talley MD Follow up.    Specialty: Family Medicine  Why: upon dc from [facility]  Contact information:  [address]  [place] [address]  [phone]               Clinic, [facility] up in 3 week(s).    Contact information:  [address] [facility]  [address]  [place] [address]  [phone]                       Contact information for after-discharge care       Destination       [facility] .    Service: Skilled Nursing  Contact information:  [address]  [facility] [id number] [phone]                                 Patient Instructions:      Diet diabetic     Notify your health care provider if you experience any of the following:  temperature >100.4     Notify your health care provider if you experience any of the following:  persistent nausea and vomiting or diarrhea     Notify your health care provider if you experience any of the following:  severe uncontrolled pain     Notify your health care provider if you experience any of the following:  redness, tenderness, or signs of infection (pain, swelling, redness, odor or green/yellow discharge around incision site)     Notify your health care provider if you experience any of the following:  difficulty breathing or increased cough     Leave dressing on - Keep it clean, dry, and intact until clinic visit     Activity as tolerated   Order Comments: OT/PT eval and treat       Significant Diagnostic Studies: Labs: CMP   Recent Labs   Lab 01/13/25  0647 01/14/25  0624    137   K 4.1 3.9    101   CO2 25 28   * 104   BUN 14 9   CREATININE 0.7 0.6   CALCIUM 8.1* 8.2*   PROT 4.7*  --    ALBUMIN 2.3* 2.3*   BILITOT 0.4  --    ALKPHOS 89  --    AST 25  --    ALT 69*  --    ANIONGAP 8 8   , CBC   Recent Labs   Lab 01/13/25  0647 01/14/25  0624   WBC 9.48 8.02   HGB 7.6* 9.7*   HCT 23.6* 29.8*    181   , and All labs within the past 24 hours have been reviewed  Imaging Results              X-Ray Chest AP Portable (Final result)  Result time 01/10/25 16:45:08      Final result by William Garduno MD (01/10/25 16:45:08)                   Impression:      No acute abnormality.      Electronically signed by: William  Laureen  Date:    01/10/2025  Time:    16:45               Narrative:    EXAMINATION:  XR CHEST AP PORTABLE    CLINICAL HISTORY:  Encounter for preprocedural cardiovascular examination    TECHNIQUE:  Single frontal view of the chest was performed.    COMPARISON:  None    FINDINGS:  The lungs are clear, with normal appearance of pulmonary vasculature and no pleural effusion or pneumothorax.    The cardiac silhouette is normal in size. The hilar and mediastinal contours are unremarkable.    Bones are intact.                                       X-Ray Femur Ap/Lat Right (Final result)  Result time 01/10/25 14:25:06      Final result by Oscar Chavez MD (01/10/25 14:25:06)                   Impression:      1.  Comminuted, displaced and angulated right intertrochanteric fracture.      Electronically signed by: Oscar Chavez MD  Date:    01/10/2025  Time:    14:25               Narrative:    EXAMINATION:  XR FEMUR 2 VIEW RIGHT    CLINICAL HISTORY:  Unspecified fall, initial encounter    TECHNIQUE:  AP and lateral views of the right femur were performed.    COMPARISON:  None    FINDINGS:  There is a comminuted, displaced and angulated right intertrochanteric fracture.    No other definite acute fractures are seen.  The knee joint is are grossly well maintained.  Hip joint is well maintained.  Visualized portions of the pelvis are intact.    Normal bowel gas pattern.  Vascular calcifications noted.                                      Pending Diagnostic Studies:       Procedure Component Value Units Date/Time    Phosphatidylethanol (PETH) [[id number]] Collected: 01/11/25 0658    Order Status: Sent Lab Status: In process Updated: 01/11/25 1833    Specimen: Blood            Medications:  Reconciled Home Medications:      Medication List        START taking these medications      acetaminophen 325 MG tablet  Commonly known as: TYLENOL  Take 2 tablets (650 mg total) by mouth every 4 (four) hours as needed.     enoxaparin 40  mg/0.4 mL Syrg  Commonly known as: LOVENOX  Inject 0.4 mLs (40 mg total) into the skin Q24H (prophylaxis, 1700).     HYDROcodone-acetaminophen 5-325 mg per tablet  Commonly known as: NORCO  Take 1 tablet by mouth every 6 (six) hours as needed for Pain.     insulin aspart U-100 100 unit/mL (3 mL) Inpn pen  Commonly known as: NovoLOG  Inject 0-10 Units into the skin before meals and at bedtime as needed (Hyperglycemia).     melatonin 3 mg tablet  Commonly known as: MELATIN  Take 2 tablets (6 mg total) by mouth nightly as needed for Insomnia.            CHANGE how you take these medications      carvediloL 25 MG tablet  Commonly known as: COREG  Take 1 tablet (25 mg total) by mouth 2 (two) times daily. Hold for systolic less than 115 and/or heart rate less than 55  What changed: additional instructions     insulin glargine (TOUJEO) 300 unit/mL (1.5 mL) Inpn pen  Commonly known as: TOUJEO SOLOSTAR U-300 INSULIN  Inject 20 Units into the skin once daily.  What changed: how much to take            CONTINUE taking these medications      0.9% NaCl SolP 250 mL with lecanemab-irmb 100 mg/mL Soln 10 mg/kg  Inject 10 mg/kg into the vein every 14 (fourteen) days.     amLODIPine 10 MG tablet  Commonly known as: NORVASC  Take 10 mg by mouth.     busPIRone 5 MG Tab  Commonly known as: BUSPAR  Take 5 mg by mouth 2 (two) times daily.     donepeziL 10 MG tablet  Commonly known as: ARICEPT  Take 10 mg by mouth every evening.     FLUoxetine 40 MG capsule  Take 40 mg by mouth once daily.     iron-vitamin C 100-250 mg (ICAR-C) 100-250 mg Tab  Take 1 tablet by mouth once daily.     levothyroxine 100 MCG tablet  Commonly known as: SYNTHROID  Take 100 mcg by mouth before breakfast.     LIDOcaine 5 %  Commonly known as: LIDODERM  Place 1 patch onto the skin once daily. Remove & Discard patch within 12 hours or as directed by MD     losartan 100 MG tablet  Commonly known as: COZAAR  Take 1 tablet (100 mg total) by mouth once daily.      meclizine 25 mg tablet  Commonly known as: ANTIVERT  Take 25 mg by mouth 3 (three) times daily as needed.     metFORMIN 500 MG tablet  Commonly known as: GLUCOPHAGE  Take 500 mg by mouth once daily.     pantoprazole 20 MG tablet  Commonly known as: PROTONIX  Take 20 mg by mouth once daily.     sucralfate 1 gram tablet  Commonly known as: CARAFATE  Take 1 g by mouth Daily.     traZODone 100 MG tablet  Commonly known as: DESYREL  Take 100 mg by mouth every evening.            STOP taking these medications      cyclobenzaprine 5 MG tablet  Commonly known as: FLEXERIL              Indwelling Lines/Drains at time of discharge:   Lines/Drains/Airways       None                   Time spent on the discharge of patient: 36 minutes         Marya Jackson MD  Department of Hospital Medicine  [facility]

## 2025-01-14 NOTE — PLAN OF CARE
[facility]  Discharge Final Note    Primary Care Provider: Dara Talley MD    Expected Discharge Date: 1/14/2025    Final Discharge Note (most recent)       Final Note - 01/14/25 1053          Final Note    Anticipated Discharge Disposition Skilled Nursing Facility     Hospital Resources/Appts/Education Provided Post-Acute resouces added to AVS        Post-Acute Status    Post-Acute Authorization Placement     Post-Acute Placement Status Set-up Complete/Auth obtained     Discharge Delays None known at this time                    Follow-up providers       Dara Talley MD   Specialty: Family Medicine   Relationship: PCP - General    [address]  [facility] [address]   Phone: [phone]       Next Steps: Follow up    Instructions: upon dc from SNF    [facility]    [address] [place] [address] [address]  [place] [address]   Phone: [phone]       Next Steps: Follow up in 3 week(s)              After-discharge care                Destination       [facility]   Service: Skilled Nursing    [address]  [facility] [address]   Phone: [phone]                             Sw met with pt and dtr-in-law at bedside to update them on d/c plans to SNF today; they are agreeable.     Patient has no d/c needs at this time. Sw to follow up, as needed, for d/c planning purposes.     1530 Possible d/c delay due to pt not having BM yet. Plan for pt to d/c to [facility] pending BM.

## 2025-01-14 NOTE — DISCHARGE INSTRUCTIONS
Weight-bearing as tolerated  Ambulate with rolling walker  Leave dressing intact until seen by orthopedics in 3 weeks

## 2025-01-14 NOTE — PLAN OF CARE
Pt tolerated interventions fair. Required MAX A for bed mobility, MOD A for STS, unable to progress gait due to fatigue and dizziness. Recommending moderate intensity therapy upon d/c.

## 2025-01-14 NOTE — PT/OT/SLP PROGRESS
Occupational Therapy  Treatment    Emely Bush   MRN: [id number]   Admitting Diagnosis: Closed intertrochanteric fracture of right femur, initial encounter    OT Date of Treatment: 01/14/25   OT Start Time: 1000  OT Stop Time: 1023  OT Total Time (min): 23 min    Billable Minutes:  Self Care/Home Management 8 minutes and Therapeutic Activity   15 MINUTES    OT/LASHELL: OT     Number of LASHELL visits since last OT visit: 0    General Precautions: Standard, fall  Orthopedic Precautions: RLE weight bearing as tolerated (Simultaneous filing. User may not have seen previous data.)  Braces: N/A  Respiratory Status: Room air         Subjective:  Communicated with NURSE AND EPIC CHART REVIEW prior to session.    Pain/Comfort  Pain Rating 1: 0/10  Location - Side 1: Right  Location - Orientation 1: generalized  Location 1: hip    Objective:  Patient found with: telemetry, peripheral IV, bed alarm     Functional Mobility:  Bed Mobility:  MAX A WITH ROLLING L<>R  MAX A WITH SEATED SCOOTING FORWARD      Transfers:   MOD A WITH WITH SIT<STAND  MAX A WITH STAND<SIT    Activities of Daily Living:     HAIR GROOMING MIN A    FACE WASHING SBA   Balance:   Static Sit: POOR+: Needs MINIMAL assist to maintain  Dynamic Sit: POOR: N/A  Static Stand: POOR: Needs MODERATE assist to maintain  Dynamic stand: 0: N/A    Therapeutic Activities and Exercises:  Educated patient on importance of increased tolerance to upright position and direct impact on CV endurance and strength. Patient encouraged to sit up in chair/ EOB, for a minimum of 2 consecutive hours including for all meals. Encouraged patient to perform AROM TE to BUE throughout the day within all available planes of motion. Re enforced importance of utilizing call light to meet needs in room and not attempt to get up without staff assistance. Patient verbalized understanding and agreed to comply.           AM-PAC 6 CLICK ADL   How much help from another person does this patient currently  "need?   1 = Unable, Total/Dependent Assistance  2 = A lot, Maximum/Moderate Assistance  3 = A little, Minimum/Contact Guard/Supervision  4 = None, Modified [place]/Independent    Putting on and taking off regular lower body clothing? : 2  Bathing (including washing, rinsing, drying)?: 2  Toileting, which includes using toilet, bedpan, or urinal? : 2  Putting on and taking off regular upper body clothing?: 3  Taking care of personal grooming such as brushing teeth?: 3  Eating meals?: 4  Daily Activity Total Score: 16     AM-PAC Raw Score CMS "G-Code Modifier Level of Impairment Assistance   6 % Total / Unable   7 - 8 CM 80 - 100% Maximal Assist   9-13 CL 60 - 80% Moderate Assist   14 - 19 CK 40 - 60% Moderate Assist   20 - 22 CJ 20 - 40% Minimal Assist   23 CI 1-20% SBA / CGA   24 CH 0% Independent/ Mod I       Patient left HOB elevated with all lines intact, call button in reach, bed alarm on, nurse notified, and family present    ASSESSMENT:  Emely Bush is a 79 y.o. female with a medical diagnosis of Closed intertrochanteric fracture of right femur, initial encounter and presents with debility and generalized weakness.    Rehab identified problem list/impairments:  weakness, impaired functional mobility, decreased safety awareness, impaired endurance, gait instability, impaired balance, impaired self care skills    Rehab potential is good.    Activity tolerance: Good    Discharge recommendations: Moderate Intensity Therapy   Barriers to discharge: Barriers to Discharge: None    Equipment recommendations: shower chair, walker, rolling    GOALS:   Multidisciplinary Problems       Occupational Therapy Goals          Problem: Occupational Therapy    Goal Priority Disciplines Outcome Interventions   Occupational Therapy Goal     OT, PT/OT Progressing    Description: Goals to be met by: 1/26/25     Patient will increase functional independence with ADLs by performing:    Toileting from bedside " commode with Stand-by Assistance for hygiene and clothing management.   Toilet transfer to bedside commode with Stand-by Assistance.  Increased functional strength in B UE grossly by 1/2 MM grade.                         Plan:  Patient to be seen 2 x/week to address the above listed problems via self-care/home management, therapeutic activities, therapeutic exercises  Plan of Care expires: 01/26/25  Plan of Care reviewed with: patient         01/14/2025

## 2025-01-14 NOTE — PLAN OF CARE
Discussed poc with pt, pt verbalized understanding    Purposeful rounding every 2hours    VS wnl  Blood glucose monitoring   Fall precautions in place, remains injury free  Pt denies c/o pain and nausea at this time  Pain and nausea under control with PRN meds    Accurate I&Os  Abx given as prescribed  Bed locked at lowest position  Call light within reach    Chart check complete  Will cont with POC

## 2025-01-14 NOTE — PROGRESS NOTES
[facility]  Orthopedics  Progress Note    Patient Name: Emely Bush  MRN: [id number]  Admission Date: 1/10/2025  Hospital Length of Stay: 4 days  Attending Provider: Marya Wadsworth MD  Primary Care Provider: Dara Talley MD  Follow-up For: Procedure(s) (LRB):  INSERTION, INTRAMEDULLARY FÁTIMA, FEMUR, PROXIMAL (Right)    Post-Operative Day: 3 Day Post-Op  Subjective:     Principal Problem:Closed intertrochanteric fracture of right femur, initial encounter    Principal Orthopedic Problem:  Right intertrochanteric femur fracture    Interval History: Emely Bush is a 79 y.o. female postop day 3 status post operative repair of right intertrochanteric femur fracture with cephalomedullary nail.  Patient is resting comfortably in bed.  Denies numbness or tingling in the extremity.    Review of patient's allergies indicates:  No Known Allergies    Current Facility-Administered Medications   Medication    0.9%  NaCl infusion (for blood administration)    acetaminophen tablet 650 mg    amLODIPine tablet 10 mg    atorvastatin tablet 20 mg    bisacodyL suppository 10 mg    carvediloL tablet 25 mg    chlorhexidine 0.12 % solution 10 mL    dextrose 50% injection 12.5 g    dextrose 50% injection 12.5 g    dextrose 50% injection 25 g    donepeziL tablet 10 mg    enoxaparin injection 40 mg    FLUoxetine capsule 40 mg    glucagon (human recombinant) injection 1 mg    glucagon (human recombinant) injection 1 mg    glucose chewable tablet 16 g    glucose chewable tablet 24 g    hydrALAZINE injection 10 mg    HYDROcodone-acetaminophen 5-325 mg per tablet 1 tablet    HYDROmorphone injection 0.2 mg    insulin aspart U-100 pen 0-10 Units    insulin glargine U-100 (Lantus) pen 10 Units    levothyroxine tablet 100 mcg    LORazepam injection 2 mg    melatonin tablet 6 mg    morphine injection 2 mg    naloxone 0.4 mg/mL injection 0.02 mg    ondansetron injection 4 mg    ondansetron injection 4 mg    ondansetron  "injection 4 mg    oxyCODONE immediate release tablet 5 mg    pantoprazole EC tablet 40 mg    polyethylene glycol packet 17 g    senna-docusate 8.6-50 mg per tablet 1 tablet    sodium chloride 0.9% flush 10 mL    thiamine tablet 100 mg    traMADoL tablet 50 mg     Objective:     Vital Signs (Most Recent):  Temp: 98.2 °F (36.8 °C) (01/14/25 0340)  Pulse: 62 (01/14/25 0340)  Resp: 16 (Simultaneous filing. User may not have seen previous data.) (01/14/25 0340)  BP: (!) 144/65 (01/14/25 0340)  SpO2: 98 % (01/14/25 0340) Vital Signs (24h Range):  Temp:  [97.1 °F (36.2 °C)-98.5 °F (36.9 °C)] 98.2 °F (36.8 °C)  Pulse:  [61-69] 62  Resp:  [15-18] 16  SpO2:  [95 %-98 %] 98 %  BP: (120-152)/(56-67) 144/65     Weight: 61.2 kg (135 lb)  Height: 5' 2" (157.5 cm)  Body mass index is 24.69 kg/m².      Intake/Output Summary (Last 24 hours) at 1/14/2025 0823  Last data filed at 1/13/2025 1705  Gross per 24 hour   Intake 447.92 ml   Output 2 ml   Net 445.92 ml       Ortho/SPM Exam  Right lower extremity:   Dressings are clean, dry, and intact   Mild edema throughout the thigh   Mild TTP around the incision   No pain with ROM or internal/external rotation   Calf and compartments are soft and compressible   Motor exam normal   Sensation and pulses intact  Cap refill brisk    GEN: Well developed, well nourished female. AAOX3. No acute distress.   Head: Normocephalic, atraumatic.   Eyes: CAMERON  Neck: Trachea is midline, no adenopathy  Resp: Breathing unlabored.  Neuro: Motor function normal, Cranial nerves intact  Psych: Mood and affect appropriate.      Significant Labs:   Recent Lab Results  (Last 5 results in the past 24 hours)        01/14/25  0624   01/14/25  0559   01/13/25  2146   01/13/25  1650   01/13/25  1139        Unit Blood Type Code               Unit Expiration               Unit Blood Type               Albumin 2.3               Anion Gap 8               Baso # 0.03               Basophil % 0.4               BUN 9          "      Calcium 8.2               Chloride 101               CO2 28               CODING SYSTEM               Creatinine 0.6               Crossmatch Interpretation               Differential Method Automated               DISPENSE STATUS               eGFR >60               Eos # 0.2               Eos % 2.1               Glucose 104               Gran # (ANC) 4.9               Gran % 61.1               Group & Rh               Hematocrit 29.8               Hemoglobin 9.7               Immature Grans (Abs) 0.03  Comment: Mild elevation in immature granulocytes is non specific and   can be seen in a variety of conditions including stress response,   acute inflammation, trauma and pregnancy. Correlation with other   laboratory and clinical findings is essential.                 Immature Granulocytes 0.4               INDIRECT FAHAD               Iron               Lymph # 2.2               Lymph % 27.6               MCH 28.3               MCHC 32.6               MCV 87               Mono # 0.7               Mono % 8.4               MPV 10.2               nRBC 0               Phosphorus Level 3.5               Platelet Count 181               POCT Glucose   106   192   184   185       Potassium 3.9               Product Code               RBC 3.43               RDW 14.1               Saturated Iron               Sodium 137               Specimen Outdate               TIBC               Transferrin               UNIT NUMBER               WBC 8.02                                    All pertinent labs within the past 24 hours have been reviewed.    Significant Imaging: I have reviewed and interpreted all pertinent imaging results/findings.    Assessment/Plan:     Active Diagnoses:    Diagnosis Date Noted POA    PRINCIPAL PROBLEM:  Closed intertrochanteric fracture of right femur, initial encounter [S72.141A] 01/10/2025 Yes    Anemia [D64.9] 01/13/2025 Yes    Primary hypertension [I10] 01/10/2025 Yes    Osteopenia after  menopause [M85.80, Z78.0] 06/02/2021 Not Applicable    Chronic systolic heart failure [I50.22] 03/08/2016 Yes    Controlled type 2 diabetes with neuropathy [E11.40] 03/26/2013 Yes    Hypothyroidism [E03.9] 03/26/2013 Yes      Problems Resolved During this Admission:       Assessment:  79 y.o. female postop day 3 status post operative repair of right intertrochanteric femur fracture with cephalomedullary nail    Plan:  Weightbearing as tolerated to the right lower extremity   PT/OT for gait training and ADLs   Rolling walker for ambulation   DVT prophylaxis per primary team  Dressing changes as needed, leave incisions covered until clinic follow up  Patient is ready for discharge from orthopedic standpoint once arrangements have been made   We will see the patient back in Ortho Trauma Clinic at 3 weeks postop    Owen Ortiz PA-C  Orthopedics  [facility]

## 2025-01-14 NOTE — PT/OT/SLP PROGRESS
Physical Therapy Treatment    Patient Name:  Emely Bush   MRN:  [id number]    Recommendations:     Discharge Recommendations: Moderate Intensity Therapy  Discharge Equipment Recommendations: to be determined by next level of care, shower chair, walker, rolling  Barriers to discharge: None    Assessment:     Emely Bush is a 79 y.o. female admitted with a medical diagnosis of Closed intertrochanteric fracture of right femur, initial encounter.  She presents with the following impairments/functional limitations: weakness, impaired endurance, impaired functional mobility, gait instability, impaired balance, pain, decreased safety awareness, decreased lower extremity function, decreased ROM, orthopedic precautions.    Patient currently demonstrates a need for moderate intensity therapy on a daily basis secondary to an acute decline from prior level of function.    Rehab Prognosis: Good; patient would benefit from acute skilled PT services to address these deficits and reach maximum level of function.    Recent Surgery: Procedure(s) (LRB):  INSERTION, INTRAMEDULLARY FÁTIMA, FEMUR, PROXIMAL (Right) 3 Days Post-Op    Plan:     During this hospitalization, patient to be seen 3 x/week to address the identified rehab impairments via gait training, therapeutic activities, therapeutic exercises and progress toward the following goals:    Plan of Care Expires:  01/26/25    Subjective     Chief Complaint: Pt agreed to participate. Presents with increased confusion this date, not oriented to place or time.   Patient/Family Comments/goals: none stated  Pain/Comfort:  Pain Rating 1: 5/10  Location - Side 1: Right  Location - Orientation 1: generalized  Location 1: hip  Pain Addressed 1: Reposition, Distraction  Pain Rating Post-Intervention 1: 5/10      Objective:     Communicated with nurse Stark and epic chart review prior to session.  Patient found supine with peripheral IV, bed alarm, oxygen upon PT entry to room.  "    General Precautions: Standard, fall  Orthopedic Precautions: RLE weight bearing as tolerated  Braces: N/A  Respiratory Status: Nasal cannula, flow 2.5 L/min     Functional Mobility:  Gait Belt Applied - Yes   Socks/Shoes Donned - Yes  Bed Mobility  Rolling Left: maximal assistance  Scooting: maximal assistance  Supine to Sit: maximal assistance for LE management and trunk management  Sit to Supine: maximal assistance for LE management and trunk management  Supine Scooting: maximal assistance and of 2 persons  Transfers  Sit to Stand: moderate assistance with rolling walker  Gait  Patient completed pre-gait marching x3 ea and 2 backward steps with rolling walker and moderate assistance. Patient demonstrates unsteady gait. Pt c/o dizziness after standing, unable to progress gait or transfer. All lines remained intact throughout ambulation trial.  Balance  Sitting: contact guard assistance  Standing: moderate assistance  Increased time and verbal cuing needed.       AM-PAC 6 CLICK MOBILITY  Turning over in bed (including adjusting bedclothes, sheets and blankets)?: 2  Sitting down on and standing up from a chair with arms (e.g., wheelchair, bedside commode, etc.): 2  Moving from lying on back to sitting on the side of the bed?: 2  Moving to and from a bed to a chair (including a wheelchair)?: 1  Need to walk in hospital room?: 1  Climbing 3-5 steps with a railing?: 1  Basic Mobility Total Score: 9       Treatment & Education:  Reviewed role of PT in acute care and POC. Pt tolerated interventions fair. Reviewed R LE WBAT. Reviewed importance of OOB activities, activity pacing, and HEP (marching/hip flex, hip abd, heel slides/LAQ, quad sets, ankle pumps) in order to maintain/regain strength. Encouraged to sit up for all meals. Reviewed proper use of RW for safety and to reduce risk of falling. Reviewed "call don't fall" policy and increased risk of falling due to weakness, instructed to utilize call bell for " assistance with all transfers. Pt agreeable to all requests.    Patient left HOB elevated with all lines intact, call button in reach, bed alarm on, and nurse notified..    GOALS:   Multidisciplinary Problems       Physical Therapy Goals          Problem: Physical Therapy    Goal Priority Disciplines Outcome Interventions   Physical Therapy Goal     PT, PT/OT Progressing    Description: LTG'S TO BE MET IN 14 DAYS (1-26-25)  PT WILL REQUIRE [facility] FOR BED MOBILITY  PT WILL REQUIRE [facility] FOR BED<>CHAIR TF'S  PT WILL  FEET WITH RW AND SBA  PT WILL INC AMPAC SCORE BY 2 POINTS TO PROGRESS GROSS FUNC MOBILITY                         Time Tracking:     PT Received On: 01/14/25  PT Start Time: 0945     PT Stop Time: 1015  PT Total Time (min): 30 min     Billable Minutes: Therapeutic Activity 30min    Treatment Type: Treatment  PT/PTA: PT     Number of PTA visits since last PT visit: 0     01/14/2025

## 2025-01-14 NOTE — PLAN OF CARE
Discussed poc with pt, pt verbalized understanding    Purposeful rounding every 2hours    VS monitored as ordered    Blood glucose monitoring     Fall precautions in place, remains injury free    Pain under control with PRN meds    IV saline locked    Bed locked at lowest position    Call light within reach    Chart check complete    Will cont with POC

## 2025-01-15 VITALS
SYSTOLIC BLOOD PRESSURE: 149 MMHG | BODY MASS INDEX: 24.84 KG/M2 | DIASTOLIC BLOOD PRESSURE: 68 MMHG | WEIGHT: 135 LBS | HEIGHT: 62 IN | RESPIRATION RATE: 18 BRPM | HEART RATE: 61 BPM | TEMPERATURE: 98 F | OXYGEN SATURATION: 96 %

## 2025-01-15 LAB
ALBUMIN SERPL BCP-MCNC: 2.2 G/DL (ref 3.5–5.2)
ANION GAP SERPL CALC-SCNC: 6 MMOL/L (ref 8–16)
BASOPHILS # BLD AUTO: 0.03 K/UL (ref 0–0.2)
BASOPHILS NFR BLD: 0.4 % (ref 0–1.9)
BUN SERPL-MCNC: 12 MG/DL (ref 8–23)
CALCIUM SERPL-MCNC: 8.1 MG/DL (ref 8.7–10.5)
CHLORIDE SERPL-SCNC: 100 MMOL/L (ref 95–110)
CO2 SERPL-SCNC: 28 MMOL/L (ref 23–29)
CREAT SERPL-MCNC: 0.6 MG/DL (ref 0.5–1.4)
DIFFERENTIAL METHOD BLD: ABNORMAL
EOSINOPHIL # BLD AUTO: 0.1 K/UL (ref 0–0.5)
EOSINOPHIL NFR BLD: 1.4 % (ref 0–8)
ERYTHROCYTE [DISTWIDTH] IN BLOOD BY AUTOMATED COUNT: 13.6 % (ref 11.5–14.5)
EST. GFR  (NO RACE VARIABLE): >60 ML/MIN/1.73 M^2
GLUCOSE SERPL-MCNC: 145 MG/DL (ref 70–110)
HCT VFR BLD AUTO: 29.6 % (ref 37–48.5)
HGB BLD-MCNC: 9.7 G/DL (ref 12–16)
IMM GRANULOCYTES # BLD AUTO: 0.04 K/UL (ref 0–0.04)
IMM GRANULOCYTES NFR BLD AUTO: 0.5 % (ref 0–0.5)
LYMPHOCYTES # BLD AUTO: 2.1 K/UL (ref 1–4.8)
LYMPHOCYTES NFR BLD: 26.5 % (ref 18–48)
MCH RBC QN AUTO: 28 PG (ref 27–31)
MCHC RBC AUTO-ENTMCNC: 32.8 G/DL (ref 32–36)
MCV RBC AUTO: 86 FL (ref 82–98)
MONOCYTES # BLD AUTO: 0.6 K/UL (ref 0.3–1)
MONOCYTES NFR BLD: 7.4 % (ref 4–15)
NEUTROPHILS # BLD AUTO: 4.9 K/UL (ref 1.8–7.7)
NEUTROPHILS NFR BLD: 63.8 % (ref 38–73)
NRBC BLD-RTO: 0 /100 WBC
PHOSPHATE SERPL-MCNC: 3.4 MG/DL (ref 2.7–4.5)
PLATELET # BLD AUTO: 228 K/UL (ref 150–450)
PMV BLD AUTO: 10 FL (ref 9.2–12.9)
POCT GLUCOSE: 154 MG/DL (ref 70–110)
POTASSIUM SERPL-SCNC: 4.2 MMOL/L (ref 3.5–5.1)
RBC # BLD AUTO: 3.46 M/UL (ref 4–5.4)
SODIUM SERPL-SCNC: 134 MMOL/L (ref 136–145)
WBC # BLD AUTO: 7.74 K/UL (ref 3.9–12.7)

## 2025-01-15 PROCEDURE — 99024 POSTOP FOLLOW-UP VISIT: CPT | Mod: ,,, | Performed by: PHYSICIAN ASSISTANT

## 2025-01-15 PROCEDURE — 25000003 PHARM REV CODE 250: Performed by: FAMILY MEDICINE

## 2025-01-15 PROCEDURE — 36415 COLL VENOUS BLD VENIPUNCTURE: CPT | Performed by: INTERNAL MEDICINE

## 2025-01-15 PROCEDURE — 25000003 PHARM REV CODE 250: Performed by: ORTHOPAEDIC SURGERY

## 2025-01-15 PROCEDURE — 63600175 PHARM REV CODE 636 W HCPCS: Performed by: INTERNAL MEDICINE

## 2025-01-15 PROCEDURE — 80069 RENAL FUNCTION PANEL: CPT | Performed by: INTERNAL MEDICINE

## 2025-01-15 PROCEDURE — 85025 COMPLETE CBC W/AUTO DIFF WBC: CPT | Performed by: INTERNAL MEDICINE

## 2025-01-15 RX ADMIN — LEVOTHYROXINE SODIUM 100 MCG: 100 TABLET ORAL at 05:01

## 2025-01-15 RX ADMIN — INSULIN ASPART 2 UNITS: 100 INJECTION, SOLUTION INTRAVENOUS; SUBCUTANEOUS at 05:01

## 2025-01-15 RX ADMIN — CHLORHEXIDINE GLUCONATE 0.12% ORAL RINSE 10 ML: 1.2 LIQUID ORAL at 09:01

## 2025-01-15 RX ADMIN — PANTOPRAZOLE SODIUM 40 MG: 40 TABLET, DELAYED RELEASE ORAL at 09:01

## 2025-01-15 RX ADMIN — IRON SUCROSE 200 MG: 20 INJECTION, SOLUTION INTRAVENOUS at 09:01

## 2025-01-15 RX ADMIN — CARVEDILOL 25 MG: 12.5 TABLET, FILM COATED ORAL at 09:01

## 2025-01-15 RX ADMIN — AMLODIPINE BESYLATE 10 MG: 10 TABLET ORAL at 09:01

## 2025-01-15 RX ADMIN — FLUOXETINE HYDROCHLORIDE 40 MG: 20 CAPSULE ORAL at 09:01

## 2025-01-15 RX ADMIN — Medication 100 MG: at 09:01

## 2025-01-15 NOTE — PLAN OF CARE
No changes to previous final note. Plan for pt to d/c to [facility] pending BM and nurse report.

## 2025-01-15 NOTE — PLAN OF CARE
Pt being discharged Skilled nursing facility in stable condition. IV removed, catheter intact, pt tolerated well. Report was called. Family at bedside was updated. Discharge instructions given to pt, pt verbalized understanding.

## 2025-01-15 NOTE — PLAN OF CARE
Patient is in stable condition, no acute distress, remained free from injuries, pain adequately controlled, dressing to R hip CDI, encouraged to turn every 2 hours, blood glucose checks performed, VSS, and all active orders reviewed. 24 hr chart check performed.

## 2025-01-15 NOTE — PROGRESS NOTES
[facility]  Orthopedics  Progress Note    Patient Name: Emely Bush  MRN: [id number]  Admission Date: 1/10/2025  Hospital Length of Stay: 5 days  Attending Provider: Marya Wadsworth MD  Primary Care Provider: Dara Talley MD  Follow-up For: Procedure(s) (LRB):  INSERTION, INTRAMEDULLARY FÁTIMA, FEMUR, PROXIMAL (Right)    Post-Operative Day: 4 Day Post-Op  Subjective:     Principal Problem:Closed intertrochanteric fracture of right femur, initial encounter    Principal Orthopedic Problem:  Right intertrochanteric femur fracture    Interval History: Emely Bush is a 79 y.o. female postop day 4 status post operative repair of right intertrochanteric femur fracture with cephalomedullary nail.  Patient is resting comfortably in bed.  Denies numbness or tingling in the extremity.    Review of patient's allergies indicates:  No Known Allergies    Current Facility-Administered Medications   Medication    0.9%  NaCl infusion (for blood administration)    acetaminophen tablet 650 mg    amLODIPine tablet 10 mg    atorvastatin tablet 20 mg    bisacodyL suppository 10 mg    carvediloL tablet 25 mg    chlorhexidine 0.12 % solution 10 mL    dextrose 50% injection 12.5 g    dextrose 50% injection 12.5 g    dextrose 50% injection 25 g    donepeziL tablet 10 mg    enoxaparin injection 40 mg    FLUoxetine capsule 40 mg    glucagon (human recombinant) injection 1 mg    glucagon (human recombinant) injection 1 mg    glucose chewable tablet 16 g    glucose chewable tablet 24 g    hydrALAZINE injection 10 mg    HYDROcodone-acetaminophen 5-325 mg per tablet 1 tablet    HYDROmorphone injection 0.2 mg    insulin aspart U-100 pen 0-10 Units    insulin glargine U-100 (Lantus) pen 10 Units    iron sucrose injection 200 mg    levothyroxine tablet 100 mcg    LORazepam injection 2 mg    melatonin tablet 6 mg    morphine injection 2 mg    naloxone 0.4 mg/mL injection 0.02 mg    ondansetron injection 4 mg    ondansetron  "injection 4 mg    ondansetron injection 4 mg    oxyCODONE immediate release tablet 5 mg    pantoprazole EC tablet 40 mg    sodium chloride 0.9% flush 10 mL    thiamine tablet 100 mg    traMADoL tablet 50 mg     Objective:     Vital Signs (Most Recent):  Temp: 98.6 °F (37 °C) (01/15/25 0355)  Pulse: (!) 56 (01/15/25 0355)  Resp: 18 (01/15/25 0355)  BP: 137/64 (01/15/25 0355)  SpO2: (!) 94 % (01/15/25 0355) Vital Signs (24h Range):  Temp:  [97.1 °F (36.2 °C)-98.7 °F (37.1 °C)] 98.6 °F (37 °C)  Pulse:  [56-83] 56  Resp:  [15-18] 18  SpO2:  [91 %-100 %] 94 %  BP: (107-167)/(53-78) 137/64     Weight: 61.2 kg (135 lb)  Height: 5' 2" (157.5 cm)  Body mass index is 24.69 kg/m².      Intake/Output Summary (Last 24 hours) at 1/15/2025 0814  Last data filed at 1/14/2025 1055  Gross per 24 hour   Intake --   Output 1 ml   Net -1 ml       Ortho/SPM Exam  Right lower extremity:   Dressings are clean, dry, and intact   Mild edema throughout the thigh   Mild TTP around the incision   No pain with ROM or internal/external rotation   Calf and compartments are soft and compressible   Motor exam normal   Sensation and pulses intact  Cap refill brisk    GEN: Well developed, well nourished female. AAOX3. No acute distress.   Head: Normocephalic, atraumatic.   Eyes: CAMERON  Neck: Trachea is midline, no adenopathy  Resp: Breathing unlabored.  Neuro: Motor function normal, Cranial nerves intact  Psych: Mood and affect appropriate.      Significant Labs:   Recent Lab Results  (Last 5 results in the past 24 hours)        01/15/25  0613   01/15/25  0509   01/14/25  2136   01/14/25  1727   01/14/25  1137        Albumin 2.2               Anion Gap 6               Baso # 0.03               Basophil % 0.4               BUN 12               Calcium 8.1               Chloride 100               CO2 28               Creatinine 0.6               Differential Method Automated               eGFR >60               Eos # 0.1               Eos % 1.4          "      Glucose 145               Gran # (ANC) 4.9               Gran % 63.8               Hematocrit 29.6               Hemoglobin 9.7               Immature Grans (Abs) 0.04  Comment: Mild elevation in immature granulocytes is non specific and   can be seen in a variety of conditions including stress response,   acute inflammation, trauma and pregnancy. Correlation with other   laboratory and clinical findings is essential.                 Immature Granulocytes 0.5               Lymph # 2.1               Lymph % 26.5               MCH 28.0               MCHC 32.8               MCV 86               Mono # 0.6               Mono % 7.4               MPV 10.0               nRBC 0               Phosphorus Level 3.4               Platelet Count 228               POCT Glucose   154   266   168   134       Potassium 4.2               RBC 3.46               RDW 13.6               Sodium 134               WBC 7.74                                    All pertinent labs within the past 24 hours have been reviewed.    Significant Imaging: I have reviewed and interpreted all pertinent imaging results/findings.    Assessment/Plan:     Active Diagnoses:    Diagnosis Date Noted POA    PRINCIPAL PROBLEM:  Closed intertrochanteric fracture of right femur, initial encounter [S72.141A] 01/10/2025 Yes    Anemia [D64.9] 01/13/2025 Yes    Primary hypertension [I10] 01/10/2025 Yes    Osteopenia after menopause [M85.80, Z78.0] 06/02/2021 Not Applicable    Chronic systolic heart failure [I50.22] 03/08/2016 Yes    Controlled type 2 diabetes with neuropathy [E11.40] 03/26/2013 Yes    Hypothyroidism [E03.9] 03/26/2013 Yes      Problems Resolved During this Admission:       Assessment:  79 y.o. female postop day 4 status post operative repair of right intertrochanteric femur fracture with cephalomedullary nail    Plan:  Weightbearing as tolerated to the right lower extremity   PT/OT for gait training and ADLs   Rolling walker for ambulation   DVT  prophylaxis per primary team  Dressing changes as needed, leave incisions covered until clinic follow up  Patient is ready for discharge from orthopedic standpoint once arrangements have been made   We will see the patient back in Ortho Trauma Clinic at 3 weeks postop    Owen Ortiz PA-C  Orthopedics  [facility]

## 2025-01-16 LAB
CLINICAL BIOCHEMIST REVIEW: NORMAL
PLPETH BLD-MCNC: 13 NG/ML
POPETH BLD-MCNC: 14 NG/ML

## 2025-01-17 DIAGNOSIS — N48.89 PENIS PAIN: Primary | ICD-10-CM

## 2025-01-17 DIAGNOSIS — M89.8X5 PAIN IN FEMUR: ICD-10-CM

## 2025-02-03 ENCOUNTER — HOSPITAL ENCOUNTER (OUTPATIENT)
Dept: RADIOLOGY | Facility: HOSPITAL | Age: 80
Discharge: HOME | End: 2025-02-03
Attending: PHYSICIAN ASSISTANT
Payer: MEDICARE

## 2025-02-03 ENCOUNTER — OFFICE VISIT (OUTPATIENT)
Dept: ORTHOPEDICS | Facility: CLINIC | Age: 80
End: 2025-02-03
Payer: MEDICARE

## 2025-02-03 VITALS
DIASTOLIC BLOOD PRESSURE: 59 MMHG | BODY MASS INDEX: 24.83 KG/M2 | WEIGHT: 134.94 LBS | SYSTOLIC BLOOD PRESSURE: 92 MMHG | HEART RATE: 61 BPM | HEIGHT: 62 IN | TEMPERATURE: 99 F

## 2025-02-03 DIAGNOSIS — S72.141A CLOSED INTERTROCHANTERIC FRACTURE OF RIGHT FEMUR, INITIAL ENCOUNTER: Primary | ICD-10-CM

## 2025-02-03 DIAGNOSIS — N48.89 PENIS PAIN: ICD-10-CM

## 2025-02-03 DIAGNOSIS — M89.8X5 PAIN IN FEMUR: ICD-10-CM

## 2025-02-03 PROCEDURE — 72170 X-RAY EXAM OF PELVIS: CPT | Mod: TC

## 2025-02-03 PROCEDURE — 99024 POSTOP FOLLOW-UP VISIT: CPT | Mod: S$GLB,,, | Performed by: PHYSICIAN ASSISTANT

## 2025-02-03 PROCEDURE — 3074F SYST BP LT 130 MM HG: CPT | Mod: CPTII,S$GLB,, | Performed by: PHYSICIAN ASSISTANT

## 2025-02-03 PROCEDURE — 3288F FALL RISK ASSESSMENT DOCD: CPT | Mod: CPTII,S$GLB,, | Performed by: PHYSICIAN ASSISTANT

## 2025-02-03 PROCEDURE — 1125F AMNT PAIN NOTED PAIN PRSNT: CPT | Mod: CPTII,S$GLB,, | Performed by: PHYSICIAN ASSISTANT

## 2025-02-03 PROCEDURE — 73552 X-RAY EXAM OF FEMUR 2/>: CPT | Mod: TC,RT

## 2025-02-03 PROCEDURE — 3078F DIAST BP <80 MM HG: CPT | Mod: CPTII,S$GLB,, | Performed by: PHYSICIAN ASSISTANT

## 2025-02-03 PROCEDURE — 72170 X-RAY EXAM OF PELVIS: CPT | Mod: 26,,, | Performed by: RADIOLOGY

## 2025-02-03 PROCEDURE — 73552 X-RAY EXAM OF FEMUR 2/>: CPT | Mod: 26,RT,, | Performed by: RADIOLOGY

## 2025-02-03 PROCEDURE — 1100F PTFALLS ASSESS-DOCD GE2>/YR: CPT | Mod: CPTII,S$GLB,, | Performed by: PHYSICIAN ASSISTANT

## 2025-02-03 PROCEDURE — 99999 PR PBB SHADOW E&M-EST. PATIENT-LVL IV: CPT | Mod: PBBFAC,,, | Performed by: PHYSICIAN ASSISTANT

## 2025-02-03 PROCEDURE — 1160F RVW MEDS BY RX/DR IN RCRD: CPT | Mod: CPTII,S$GLB,, | Performed by: PHYSICIAN ASSISTANT

## 2025-02-03 PROCEDURE — 1159F MED LIST DOCD IN RCRD: CPT | Mod: CPTII,S$GLB,, | Performed by: PHYSICIAN ASSISTANT

## 2025-02-03 NOTE — PROGRESS NOTES
Subjective:      Patient ID: Emely Bush is a 79 y.o. female.    History of Present Illness    CHIEF COMPLAINT:  - Post-operative follow-up for right intertrochanteric femur fracture repair.    HPI:  Emely presents for post-operative follow-up 23 days after repair of a right intertrochanteric femur fracture with cephalomedullary nail. She reports improvement in her leg since surgery, describing minimal pain with some soreness but no sharp pain. She indicates slight discomfort but emphasizes it's not severe. She denies pain in the groin area when standing up and mentions her ankle pain from the same injury has improved significantly.    Her mobility is limited. She can walk slowly with a walker for short distances, but requires close supervision and a wheelchair behind her to prevent falls. She is currently staying at [facility] where she undergoes daily exercises. Emely expresses frustration with the slow recovery process and a desire to leave the current facility.    SURGICAL HISTORY:  - Right intertrochanteric femur fracture repair with cephalomedullary nail: 23 days ago  - Previous surgery on the left hip: a few years ago        Past Medical History:   Diagnosis Date    Coronary artery disease     Diabetes mellitus     Hypertension      Current Outpatient Medications:     acetaminophen (TYLENOL) 325 MG tablet, Take 2 tablets (650 mg total) by mouth every 4 (four) hours as needed., Disp: , Rfl:     amLODIPine (NORVASC) 10 MG tablet, Take 10 mg by mouth., Disp: , Rfl:     busPIRone (BUSPAR) 5 MG Tab, Take 5 mg by mouth 2 (two) times daily., Disp: , Rfl:     carvediloL (COREG) 25 MG tablet, Take 1 tablet (25 mg total) by mouth 2 (two) times daily. Hold for systolic less than 115 and/or heart rate less than 55, Disp: , Rfl:     donepeziL (ARICEPT) 10 MG tablet, Take 10 mg by mouth every evening., Disp: , Rfl:     enoxaparin (LOVENOX) 40 mg/0.4 mL Syrg, Inject 0.4 mLs (40 mg total) into the skin Q24H  "(prophylaxis, 1700)., Disp: , Rfl:     FLUoxetine 40 MG capsule, Take 40 mg by mouth once daily., Disp: , Rfl:     HYDROcodone-acetaminophen (NORCO) 5-325 mg per tablet, Take 1 tablet by mouth every 6 (six) hours as needed for Pain., Disp: 10 tablet, Rfl: 0    insulin aspart U-100 (NOVOLOG) 100 unit/mL (3 mL) InPn pen, Inject 0-10 Units into the skin before meals and at bedtime as needed (Hyperglycemia)., Disp: , Rfl:     insulin glargine, TOUJEO, (TOUJEO SOLOSTAR U-300 INSULIN) 300 unit/mL (1.5 mL) InPn pen, Inject 20 Units into the skin once daily., Disp: , Rfl:     iron-vitamin C 100-250 mg, ICAR-C, 100-250 mg Tab, Take 1 tablet by mouth once daily., Disp: , Rfl:     levothyroxine (SYNTHROID) 100 MCG tablet, Take 100 mcg by mouth before breakfast., Disp: , Rfl:     LIDOcaine (LIDODERM) 5 %, Place 1 patch onto the skin once daily. Remove & Discard patch within 12 hours or as directed by MD, Disp: , Rfl: 0    losartan (COZAAR) 100 MG tablet, Take 1 tablet (100 mg total) by mouth once daily., Disp: , Rfl:     meclizine (ANTIVERT) 25 mg tablet, Take 25 mg by mouth 3 (three) times daily as needed., Disp: , Rfl:     melatonin (MELATIN) 3 mg tablet, Take 2 tablets (6 mg total) by mouth nightly as needed for Insomnia., Disp: , Rfl:     metFORMIN (GLUCOPHAGE) 500 MG tablet, Take 500 mg by mouth once daily., Disp: , Rfl:     pantoprazole (PROTONIX) 20 MG tablet, Take 20 mg by mouth once daily., Disp: , Rfl:     sucralfate (CARAFATE) 1 gram tablet, Take 1 g by mouth Daily., Disp: , Rfl:     traZODone (DESYREL) 100 MG tablet, Take 100 mg by mouth every evening., Disp: , Rfl:     0.9% NaCl SolP 250 mL with lecanemab-irmb 100 mg/mL Soln 10 mg/kg, Inject 10 mg/kg into the vein every 14 (fourteen) days., Disp: , Rfl:     Review of patient's allergies indicates:  No Known Allergies    BP (!) 92/59 (BP Location: Left arm, Patient Position: Sitting)   Pulse 61   Temp 99.1 °F (37.3 °C) (Oral)   Ht 5' 2" (1.575 m)   Wt 61.2 kg " (134 lb 14.7 oz)   BMI 24.68 kg/m²       Objective:    Ortho Exam   Right lower extremity:   Incision is well healed, no signs of infection   Minimal edema   Minimal TTP   No groin pain with internal/external rotation, but there is some soft tissue soreness   Hip ROM normal   Calf and compartments are soft and compressible   Motor exam normal   Sensation and pulses intact   Cap refill brisk    GEN: Well developed, well nourished female. AAOX3. No acute distress.   Head: Normocephalic, atraumatic.   Eyes: CAMERON  Neck: Trachea is midline, no adenopathy  Resp: Breathing unlabored.  Neuro: Motor function normal, Cranial nerves intact  Psych: Mood and affect appropriate.    Assessment:     Imaging:  X-ray right femur and AP pelvis obtained today were reviewed and show hardware in satisfactory alignment no signs of failure.  Interval healing noted.  No detrimental changes.  Vascular calcifications are noted.      1. Closed intertrochanteric fracture of right femur, initial encounter        Plan:     - Reviewed the radiographs with the patient.    - Encouraged her that everything is stable and appears to be healing as expected.    - Encouraged her on the progress she has made so far.    - She should continue working with therapy for gait training and ADLs.    - She may be discharged home from the facility when she can safely navigate her home environment.    - Cautioned patient on fall avoidance.  Return to clinic in about 3 weeks for repeat radiographs and further evaluation.     Follow up in about 3 weeks (around 2/24/2025).      Patient note was created using MModal Dictation.  Any errors in syntax or even information may not have been identified and edited on initial review prior to signing this note.    This note was generated with the assistance of ambient listening technology. Verbal consent was obtained by the patient and accompanying visitor(s) for the recording of patient appointment to facilitate this note. I  attest to having reviewed and edited the generated note for accuracy, though some syntax or spelling errors may persist. Please contact the author of this note for any clarification.

## 2025-04-07 NOTE — CONSULTS
[facility]  Orthopedics  Consult Note    Patient Name: Emely Bush  MRN: [id number]  Admission Date: 1/10/2025  Hospital Length of Stay: 0 days  Attending Provider: Elizabeth Small MD  Primary Care Provider: Dara Talley MD    Patient information was obtained from patient, spouse/SO, past medical records, ER records, and primary team.     Inpatient consult to Orthopedic Surgery  Consult performed by: Owen Ortiz PA-C  Consult ordered by: Emelyn Beverly MD        Subjective:     Principal Problem:Closed nondisplaced intertrochanteric fracture of right femur    Chief Complaint:   Chief Complaint   Patient presents with    Fall     Pt c/o right thigh pain after she got up to use the restroom this morning and fell. Swelling noted. Pt doesn't recall if she hit her head or lost consciousness. Taking ASA. Hx of dementia.         HPI: Emely Bush is a 79 y.o. female with past medical history significant for dementia, type 2 diabetes, hypertension, hyperlipidemia, osteopenia, chronic systolic heart failure, TIA, and coronary artery disease is in the emergency department and Orthopedics has been consulted for right intertrochanteric femur fracture.  This injury occurred this morning when the patient got up to go to the restroom and suffered a fall.  She reports immediate pain and inability to bear weight.  She is unsure of whether she hit her head or lost consciousness.  She takes blood aspirin.  Denies numbness or tingling in the extremity.    Past Medical History:   Diagnosis Date    Coronary artery disease     Diabetes mellitus     Hypertension        Past Surgical History:   Procedure Laterality Date    APPENDECTOMY      CARDIAC SURGERY      CHOLECYSTECTOMY      CORONARY ARTERY BYPASS GRAFT      HYSTERECTOMY      INTRAMEDULLARY RODDING OF FEMUR Left 4/25/2021    Procedure: INSERTION, INTREMEDULLARY FÁTIMA, FEMUR;  Surgeon: An Mart MD;  Location: [facility];  Service: Orthopedics;  Laterality:  Left;  Left hip cephalomedullarly nail, or spinal/sifi block    THYROID SURGERY  1995       Review of patient's allergies indicates:  No Known Allergies    Current Facility-Administered Medications   Medication    acetaminophen tablet 650 mg    amLODIPine tablet 10 mg    atorvastatin tablet 20 mg    carvediloL tablet 25 mg    dextrose 50% injection 12.5 g    dextrose 50% injection 25 g    donepeziL tablet 10 mg    [START ON 1/11/2025] FLUoxetine capsule 40 mg    glucagon (human recombinant) injection 1 mg    glucose chewable tablet 16 g    glucose chewable tablet 24 g    hydrALAZINE injection 10 mg    HYDROcodone-acetaminophen 5-325 mg per tablet 1 tablet    insulin aspart U-100 pen 0-10 Units    [START ON 1/11/2025] levothyroxine tablet 100 mcg    morphine injection 4 mg    naloxone 0.4 mg/mL injection 0.02 mg    ondansetron injection 4 mg    oxyCODONE immediate release tablet 5 mg    [START ON 1/11/2025] pantoprazole EC tablet 40 mg    [START ON 1/11/2025] polyethylene glycol packet 17 g    sodium chloride 0.9% flush 10 mL    [START ON 1/11/2025] thiamine tablet 100 mg    traMADoL tablet 50 mg     Current Outpatient Medications   Medication Sig    amLODIPine (NORVASC) 10 MG tablet Take 10 mg by mouth.    atorvastatin (LIPITOR) 20 MG tablet Take 1 tablet (20 mg total) by mouth every evening.    busPIRone (BUSPAR) 5 MG Tab Take 5 mg by mouth 2 (two) times daily.    carvediloL (COREG) 25 MG tablet Take 1 tablet (25 mg total) by mouth 2 (two) times daily.    donepeziL (ARICEPT) 10 MG tablet Take 10 mg by mouth every evening.    FLUoxetine 40 MG capsule Take 40 mg by mouth once daily.    levothyroxine (SYNTHROID) 100 MCG tablet Take 100 mcg by mouth before breakfast.    meclizine (ANTIVERT) 25 mg tablet Take 25 mg by mouth 3 (three) times daily as needed.    alendronate (FOSAMAX) 70 MG tablet TAKE 1 TABLET BY MOUTH 30 MINUTES BEFORE THE FIRST FOOD, BEVERAGE OR MEDICINE OF THE DAY WITH PLAIN WATER ON SUNDAY    aspirin  "81 MG Chew Take 1 tablet (81 mg total) by mouth once daily.    chlordiazepoxide (LIBRIUM) 10 MG capsule Take 1 capsule (10 mg total) by mouth every evening. for 5 days    denosumab (PROLIA) 60 mg/mL Syrg as directed    folic acid (FOLVITE) 1 MG tablet Take 1 tablet (1 mg total) by mouth once daily.    HYDROcodone-acetaminophen (NORCO) 5-325 mg per tablet Take 1 tablet by mouth nightly as needed for Pain.    ibandronate (BONIVA) 150 mg tablet Take 150 mg by mouth every 30 days.    insulin aspart U-100 (NOVOLOG) 100 unit/mL (3 mL) InPn pen Inject 0-5 Units into the skin before meals and at bedtime as needed (Hyperglycemia). **LOW CORRECTION DOSE**  Blood Glucose  mg/dL                  Pre-meal                2200  151-200                0 unit                      0 unit  201-250                2 units                    1 unit  251-300                3 units                    1 unit  301-350                4 units                    2 units  >350                     5 units                    3 units  Administer subcutaneously if needed at times designated by monitoring schedule.   DO NOT HOLD correction dose insulin for patients who are  NPO. "HIGH ALERT MEDICATION" - Administer with meals or TF/TPN.    insulin detemir U-100 (LEVEMIR FLEXTOUCH) 100 unit/mL (3 mL) SubQ InPn pen Inject 10 Units into the skin every evening.    insulin glargine, TOUJEO, (TOUJEO SOLOSTAR U-300 INSULIN) 300 unit/mL (1.5 mL) InPn pen 26 units    LIDOcaine (LIDODERM) 5 % Place 1 patch onto the skin once daily. Remove & Discard patch within 12 hours or as directed by MD    losartan (COZAAR) 100 MG tablet Take 1 tablet (100 mg total) by mouth once daily.    metFORMIN (GLUCOPHAGE) 500 MG tablet Take 500 mg by mouth once daily.    mupirocin (BACTROBAN) 2 % ointment by Nasal route 2 (two) times daily.    pantoprazole (PROTONIX) 20 MG tablet Take 20 mg by mouth once daily.    sucralfate (CARAFATE) 1 gram tablet 1 tablet on an empty stomach    " thiamine 100 MG tablet Take 1 tablet (100 mg total) by mouth once daily.    thiamine mononitrate, vit B1, (VITAMIN B-1, MONONITRATE,) 100 mg Tab 1 tablet    tirzepatide (MOUNJARO) 2.5 mg/0.5 mL PnIj INJECT 2 PENS UNDER THE SKIN EVERY 7 DAYS.    traZODone (DESYREL) 100 MG tablet Take 100 mg by mouth every evening.     Family History       Problem Relation (Age of Onset)    Chronic back pain Mother    Heart disease Father    Hypertension Father          Tobacco Use    Smoking status: Never    Smokeless tobacco: Never   Substance and Sexual Activity    Alcohol use: Yes     Alcohol/week: 2.0 standard drinks of alcohol     Types: 2 Glasses of wine per week     Comment: nightly, 2 glasses of wine    Drug use: Never    Sexual activity: Not on file     Review of Systems   Constitutional: Negative for chills, decreased appetite, fever and weight loss.   HENT:  Negative for congestion, hoarse voice and sore throat.    Eyes:  Negative for blurred vision, double vision, vision loss in left eye and vision loss in right eye.   Cardiovascular:  Negative for chest pain, palpitations and syncope.   Respiratory:  Negative for cough, shortness of breath and wheezing.    Endocrine: Negative for cold intolerance and heat intolerance.   Hematologic/Lymphatic: Negative for bleeding problem. Does not bruise/bleed easily.   Skin:  Negative for dry skin, flushing, itching and suspicious lesions.   Musculoskeletal:  Positive for falls, joint pain and joint swelling.   Gastrointestinal:  Negative for abdominal pain, diarrhea, nausea and vomiting.   Genitourinary:  Negative for dysuria, frequency and urgency.   Neurological:  Negative for dizziness, headaches, numbness and paresthesias.   Psychiatric/Behavioral:  Negative for altered mental status and memory loss.      Objective:     Vital Signs (Most Recent):  Temp: 98.2 °F (36.8 °C) (01/10/25 1642)  Pulse: 70 (01/10/25 1642)  Resp: 18 (01/10/25 1711)  BP: (!) 123/56 (01/10/25 1745)  SpO2: 95  "% (01/10/25 1642) Vital Signs (24h Range):  Temp:  [98.1 °F (36.7 °C)-98.2 °F (36.8 °C)] 98.2 °F (36.8 °C)  Pulse:  [67-70] 70  Resp:  [18-20] 18  SpO2:  [95 %-99 %] 95 %  BP: (123-180)/(56-77) 123/56     Weight: 61.2 kg (135 lb)  Height: 5' 2" (157.5 cm)  Body mass index is 24.69 kg/m².    No intake or output data in the 24 hours ending 01/10/25 1816    Ortho/SPM Exam  Right lower extremity:   Extremity is shortened and externally rotated   Skin is intact   No edema   Severe TTP around the hip/groin  Logroll/ROM not tested secondary to known fracture   Calf and compartments are soft and compressible   Motor exam normal   Sensation and pulses intact   Cap refill brisk    GEN: Well developed, well nourished female. AAOX3. No acute distress.   Head: Normocephalic, atraumatic.   Eyes: CAMERON  Neck: Trachea is midline, no adenopathy  Resp: Breathing unlabored.  Neuro: Motor function normal, Cranial nerves intact  Psych: Mood and affect appropriate.      Significant Labs:   Recent Lab Results         01/10/25  1743   01/10/25  1710   01/10/25  1634   01/10/25  1608        Albumin       3.5       ALP       77       ALT       21       Anion Gap       13       Appearance, UA     Clear              Clear         AST       23       Baso #       0.03       Basophil %       0.3       Bilirubin (UA)     Negative              Negative         BILIRUBIN TOTAL       1.0  Comment: For infants and newborns, interpretation of results should be based  on gestational age, weight and in agreement with clinical  observations.    Premature Infant recommended reference ranges:  Up to 24 hours.............<8.0 mg/dL  Up to 48 hours............<12.0 mg/dL  3-5 days..................<15.0 mg/dL  6-29 days.................<15.0 mg/dL         BUN       12       Calcium       8.8       Chloride       105       CO2       22       Color, UA     Yellow              Yellow         Creatinine       0.7       Differential Method       Automated       " eGFR       >60       Eos #       0.0       Eos %       0.0       Glucose       168       Glucose, UA     Negative              Negative         Gran # (ANC)       8.3       Gran %       84.2       Hematocrit       36.6       Hemoglobin       12.0       Immature Grans (Abs)       0.05  Comment: Mild elevation in immature granulocytes is non specific and   can be seen in a variety of conditions including stress response,   acute inflammation, trauma and pregnancy. Correlation with other   laboratory and clinical findings is essential.         Immature Granulocytes       0.5       Ketones, UA     2+              2+         Leukocyte Esterase, UA     Negative              Negative         Lymph #       1.1       Lymph %       11.1       MCH       28.9       MCHC       32.8       MCV       88       Mono #       0.4       Mono %       3.9       MPV       9.8       NITRITE UA     Negative              Negative         nRBC       0       Blood, UA     Negative              Negative         pH, UA     8.0              8.0         Platelet Count       192       POCT Glucose 174   182           Potassium       4.5       PROTEIN TOTAL       6.5       Protein, UA     Negative  Comment: Recommend a 24 hour urine protein or a urine   protein/creatinine ratio if globulin induced proteinuria is  clinically suspected.                Trace  Comment: Recommend a 24 hour urine protein or a urine   protein/creatinine ratio if globulin induced proteinuria is  clinically suspected.           RBC       4.15       RDW       13.0       Sodium       140       Spec Grav UA     1.020              1.020         Specimen UA     Urine, Catheterized              Urine, Catheterized         UROBILINOGEN UA     2.0-3.0              2.0-3.0         WBC       9.89             All pertinent labs within the past 24 hours have been reviewed.    Significant Imaging: X-Ray: I have reviewed all pertinent results/findings and my personal findings are:  X-ray  right femur was reviewed and shows comminuted right intertrochanteric femur fracture with displacement and angulation.  No associated dislocation.    Assessment/Plan:     Active Diagnoses:    Diagnosis Date Noted POA    PRINCIPAL PROBLEM:  Closed nondisplaced intertrochanteric fracture of right femur [S72.144A] 01/10/2025 Yes    Primary hypertension [I10] 01/10/2025 Yes    Osteopenia after menopause [M85.80, Z78.0] 06/02/2021 Not Applicable    Chronic systolic heart failure [I50.22] 03/08/2016 Yes    Controlled type 2 diabetes with neuropathy [E11.40] 03/26/2013 Yes    Hypothyroidism [E03.9] 03/26/2013 Yes      Problems Resolved During this Admission:       Assessment:   79 y.o. female with past medical history significant for dementia, type 2 diabetes, hypertension, hyperlipidemia, osteopenia, chronic systolic heart failure, TIA, and coronary artery disease is in the emergency department and Orthopedics has been consulted for right intertrochanteric femur fracture    Plan:   Reviewed the radiographs with the patient and her .  Discussed the recommendation for surgery for operative fixation with intramedullary nail.  Discussed the risks and benefits of this surgery as well as the risks and benefits of alternative treatment options.  Also discussed the patient's right to refuse all treatment.  All questions were asked and answered.  Patient voiced understanding and willingness to proceed.  Patient has been admitted to hospital medicine service.  Hold blood thinners.  Keep NPO after midnight for surgery tomorrow morning.  Nonweightbearing to the right lower extremity until after surgery.    Owen Ortiz PA-C  Orthopedics  [facility]         Hide Additional Notes?: No Detail Level: Zone Detail Level: Generalized

## (undated) DEVICE — DRAPE PLASTIC U 60X72

## (undated) DEVICE — SOL 9P NACL IRR PIC IL

## (undated) DEVICE — SEE MEDLINE ITEM 146347

## (undated) DEVICE — MANIFOLD 4 PORT

## (undated) DEVICE — COVER LIGHT HANDLE 80/CA

## (undated) DEVICE — SUT 3-0 MONOCRYL PLUS PS-2

## (undated) DEVICE — SEE MEDLINE ITEM 157166

## (undated) DEVICE — DRESSING AQUACEL AG 3.5X10IN

## (undated) DEVICE — GLOVE SURGEONS ULTRA TOUCH 6.5

## (undated) DEVICE — Device

## (undated) DEVICE — TIP SUCTION YANKAUER

## (undated) DEVICE — KIT PT CARE HANA PROFX SSXT

## (undated) DEVICE — DRAPE MOBILE C-ARM

## (undated) DEVICE — DRAPE C-ARMOR EQUIPMENT COVER

## (undated) DEVICE — DRAPE U SPLIT SHEET 54X76IN

## (undated) DEVICE — PACK BASIC SETUP SC BR

## (undated) DEVICE — TAPE SILK 3IN

## (undated) DEVICE — TAPE SURGICAL MICROFOAM 3IN

## (undated) DEVICE — SUT VICRYL 2-0 36 CT-1

## (undated) DEVICE — UNDERGLOVES BIOGEL PI SIZE 8

## (undated) DEVICE — GAUZE SPONGE 4X4 12PLY

## (undated) DEVICE — DRAPE INCISE IOBAN 2 23X33IN

## (undated) DEVICE — BIT DRILL QC 3FLUTED 4.2X145 S

## (undated) DEVICE — SEE MEDLINE ITEM 157027

## (undated) DEVICE — IMPLANTABLE DEVICE
Type: IMPLANTABLE DEVICE | Site: FEMUR | Status: NON-FUNCTIONAL
Brand: BALL NOSE GUIDE WIRE, Ø3.0MM X 900MM
Removed: 2025-01-11

## (undated) DEVICE — SEE MEDLINE ITEM 152622

## (undated) DEVICE — COVER OVERHEAD SURG LT BLUE

## (undated) DEVICE — ELECTRODE REM PLYHSV RETURN 9

## (undated) DEVICE — SUT VICRYL PLUS 0 CT1 36IN

## (undated) DEVICE — DRESSING XEROFORM FOIL PK 1X8

## (undated) DEVICE — WIRE GUIDE 3.2MM 400MM
Type: IMPLANTABLE DEVICE | Site: HIP | Status: NON-FUNCTIONAL
Removed: 2021-04-25

## (undated) DEVICE — SPONGE COTTON TRAY 4X4IN

## (undated) DEVICE — PAD ABDOMINAL STERILE 8X10IN

## (undated) DEVICE — GOWN POLY REINF BRTH SLV XL

## (undated) DEVICE — SYR IRRIGATION BULB STER 60ML

## (undated) DEVICE — SEE L#153236

## (undated) DEVICE — TOWEL OR DISP STRL BLUE 4/PK

## (undated) DEVICE — SEE MEDLINE ITEM 157131

## (undated) DEVICE — SEE MEDLINE ITEM 157117

## (undated) DEVICE — GLOVE SURG BIOGEL LATEX SZ 7.5

## (undated) DEVICE — YANKAUER FLEX NO VENT REG CAP

## (undated) DEVICE — PAD ABD 8X10 STERILE

## (undated) DEVICE — GLOVE SURGICAL LATEX SZ 6.5